# Patient Record
Sex: MALE | Race: WHITE | NOT HISPANIC OR LATINO | URBAN - METROPOLITAN AREA
[De-identification: names, ages, dates, MRNs, and addresses within clinical notes are randomized per-mention and may not be internally consistent; named-entity substitution may affect disease eponyms.]

---

## 2019-01-11 VITALS
HEART RATE: 89 BPM | OXYGEN SATURATION: 100 % | RESPIRATION RATE: 18 BRPM | TEMPERATURE: 100 F | SYSTOLIC BLOOD PRESSURE: 160 MMHG | DIASTOLIC BLOOD PRESSURE: 77 MMHG

## 2019-01-11 PROCEDURE — 99285 EMERGENCY DEPT VISIT HI MDM: CPT | Mod: 25

## 2019-01-11 RX ORDER — ACETAMINOPHEN 500 MG
975 TABLET ORAL ONCE
Qty: 0 | Refills: 0 | Status: COMPLETED | OUTPATIENT
Start: 2019-01-11 | End: 2019-01-11

## 2019-01-11 RX ORDER — SODIUM CHLORIDE 9 MG/ML
1000 INJECTION INTRAMUSCULAR; INTRAVENOUS; SUBCUTANEOUS ONCE
Qty: 0 | Refills: 0 | Status: COMPLETED | OUTPATIENT
Start: 2019-01-11 | End: 2019-01-11

## 2019-01-11 RX ADMIN — Medication 975 MILLIGRAM(S): at 23:53

## 2019-01-11 RX ADMIN — SODIUM CHLORIDE 1000 MILLILITER(S): 9 INJECTION INTRAMUSCULAR; INTRAVENOUS; SUBCUTANEOUS at 23:53

## 2019-01-11 NOTE — ED ADULT TRIAGE NOTE - CHIEF COMPLAINT QUOTE
BIBA for abdominal pain with diarrhea x 4 days. Patient was seen at [facility] ed yest, treated and released as per pt. oral temp 100.3 at triage. All other vs are stable.

## 2019-01-12 ENCOUNTER — INPATIENT (INPATIENT)
Facility: HOSPITAL | Age: 49
LOS: 1 days | Discharge: HOME | DRG: 975 | End: 2019-01-14
Attending: HOSPITALIST | Admitting: HOSPITALIST
Payer: MEDICARE

## 2019-01-12 DIAGNOSIS — K52.9 NONINFECTIVE GASTROENTERITIS AND COLITIS, UNSPECIFIED: ICD-10-CM

## 2019-01-12 DIAGNOSIS — Z29.9 ENCOUNTER FOR PROPHYLACTIC MEASURES, UNSPECIFIED: ICD-10-CM

## 2019-01-12 DIAGNOSIS — A41.9 SEPSIS, UNSPECIFIED ORGANISM: ICD-10-CM

## 2019-01-12 DIAGNOSIS — E87.8 OTHER DISORDERS OF ELECTROLYTE AND FLUID BALANCE, NOT ELSEWHERE CLASSIFIED: ICD-10-CM

## 2019-01-12 DIAGNOSIS — B20 HUMAN IMMUNODEFICIENCY VIRUS [HIV] DISEASE: ICD-10-CM

## 2019-01-12 LAB
ALBUMIN SERPL ELPH-MCNC: 2.3 G/DL — LOW (ref 3.4–5)
ALBUMIN SERPL ELPH-MCNC: 3 G/DL — LOW (ref 3.4–5)
ALP SERPL-CCNC: 119 U/L — SIGNIFICANT CHANGE UP (ref 40–120)
ALP SERPL-CCNC: 82 U/L — SIGNIFICANT CHANGE UP (ref 40–120)
ALT FLD-CCNC: 29 U/L — SIGNIFICANT CHANGE UP (ref 12–42)
ALT FLD-CCNC: 41 U/L — SIGNIFICANT CHANGE UP (ref 12–42)
ANION GAP SERPL CALC-SCNC: 11 MMOL/L — SIGNIFICANT CHANGE UP (ref 5–17)
ANION GAP SERPL CALC-SCNC: 11 MMOL/L — SIGNIFICANT CHANGE UP (ref 9–16)
ANION GAP SERPL CALC-SCNC: 11 MMOL/L — SIGNIFICANT CHANGE UP (ref 9–16)
APPEARANCE UR: CLEAR — SIGNIFICANT CHANGE UP
APTT BLD: 24.8 SEC — LOW (ref 27.5–36.3)
AST SERPL-CCNC: 28 U/L — SIGNIFICANT CHANGE UP (ref 15–37)
AST SERPL-CCNC: 37 U/L — SIGNIFICANT CHANGE UP (ref 15–37)
BILIRUB SERPL-MCNC: 0.5 MG/DL — SIGNIFICANT CHANGE UP (ref 0.2–1.2)
BILIRUB SERPL-MCNC: 0.5 MG/DL — SIGNIFICANT CHANGE UP (ref 0.2–1.2)
BILIRUB UR-MCNC: NEGATIVE — SIGNIFICANT CHANGE UP
BUN SERPL-MCNC: 10 MG/DL — SIGNIFICANT CHANGE UP (ref 7–23)
BUN SERPL-MCNC: 11 MG/DL — SIGNIFICANT CHANGE UP (ref 7–23)
BUN SERPL-MCNC: 16 MG/DL — SIGNIFICANT CHANGE UP (ref 7–23)
CALCIUM SERPL-MCNC: 7.7 MG/DL — LOW (ref 8.4–10.5)
CALCIUM SERPL-MCNC: 7.7 MG/DL — LOW (ref 8.5–10.5)
CALCIUM SERPL-MCNC: 8.6 MG/DL — SIGNIFICANT CHANGE UP (ref 8.5–10.5)
CHLORIDE SERPL-SCNC: 102 MMOL/L — SIGNIFICANT CHANGE UP (ref 96–108)
CHLORIDE SERPL-SCNC: 103 MMOL/L — SIGNIFICANT CHANGE UP (ref 96–108)
CHLORIDE SERPL-SCNC: 96 MMOL/L — SIGNIFICANT CHANGE UP (ref 96–108)
CO2 SERPL-SCNC: 23 MMOL/L — SIGNIFICANT CHANGE UP (ref 22–31)
CO2 SERPL-SCNC: 23 MMOL/L — SIGNIFICANT CHANGE UP (ref 22–31)
CO2 SERPL-SCNC: 25 MMOL/L — SIGNIFICANT CHANGE UP (ref 22–31)
COLOR SPEC: YELLOW — SIGNIFICANT CHANGE UP
CREAT SERPL-MCNC: 0.84 MG/DL — SIGNIFICANT CHANGE UP (ref 0.5–1.3)
CREAT SERPL-MCNC: 1 MG/DL — SIGNIFICANT CHANGE UP (ref 0.5–1.3)
CREAT SERPL-MCNC: 1.21 MG/DL — SIGNIFICANT CHANGE UP (ref 0.5–1.3)
CULTURE RESULTS: SIGNIFICANT CHANGE UP
DIFF PNL FLD: NEGATIVE — SIGNIFICANT CHANGE UP
GLUCOSE BLDC GLUCOMTR-MCNC: 147 MG/DL — HIGH (ref 70–99)
GLUCOSE BLDC GLUCOMTR-MCNC: 73 MG/DL — SIGNIFICANT CHANGE UP (ref 70–99)
GLUCOSE SERPL-MCNC: 119 MG/DL — HIGH (ref 70–99)
GLUCOSE SERPL-MCNC: 124 MG/DL — HIGH (ref 70–99)
GLUCOSE SERPL-MCNC: 142 MG/DL — HIGH (ref 70–99)
GLUCOSE UR QL: NEGATIVE — SIGNIFICANT CHANGE UP
HCT VFR BLD CALC: 36.6 % — LOW (ref 39–50)
HCT VFR BLD CALC: 45 % — SIGNIFICANT CHANGE UP (ref 39–50)
HGB BLD-MCNC: 12.9 G/DL — LOW (ref 13–17)
HGB BLD-MCNC: 15.6 G/DL — SIGNIFICANT CHANGE UP (ref 13–17)
INR BLD: 1.15 — SIGNIFICANT CHANGE UP (ref 0.88–1.16)
KETONES UR-MCNC: NEGATIVE — SIGNIFICANT CHANGE UP
LACTATE SERPL-SCNC: 1.3 MMOL/L — SIGNIFICANT CHANGE UP (ref 0.4–2)
LEUKOCYTE ESTERASE UR-ACNC: NEGATIVE — SIGNIFICANT CHANGE UP
LYMPHOCYTES # BLD AUTO: 27 % — SIGNIFICANT CHANGE UP (ref 13–44)
MAGNESIUM SERPL-MCNC: 1.5 MG/DL — LOW (ref 1.6–2.6)
MAGNESIUM SERPL-MCNC: 1.9 MG/DL — SIGNIFICANT CHANGE UP (ref 1.6–2.6)
MAGNESIUM SERPL-MCNC: 2.1 MG/DL — SIGNIFICANT CHANGE UP (ref 1.6–2.6)
MCHC RBC-ENTMCNC: 29.2 PG — SIGNIFICANT CHANGE UP (ref 27–34)
MCHC RBC-ENTMCNC: 29.8 PG — SIGNIFICANT CHANGE UP (ref 27–34)
MCHC RBC-ENTMCNC: 34.7 G/DL — SIGNIFICANT CHANGE UP (ref 32–36)
MCHC RBC-ENTMCNC: 35.2 G/DL — SIGNIFICANT CHANGE UP (ref 32–36)
MCV RBC AUTO: 84.3 FL — SIGNIFICANT CHANGE UP (ref 80–100)
MCV RBC AUTO: 84.5 FL — SIGNIFICANT CHANGE UP (ref 80–100)
MONOCYTES NFR BLD AUTO: 9 % — SIGNIFICANT CHANGE UP (ref 2–14)
NEUTROPHILS NFR BLD AUTO: 31 % — LOW (ref 43–77)
NITRITE UR-MCNC: NEGATIVE — SIGNIFICANT CHANGE UP
PH UR: 5.5 — SIGNIFICANT CHANGE UP (ref 5–8)
PHOSPHATE SERPL-MCNC: 1.8 MG/DL — LOW (ref 2.5–4.5)
PLATELET # BLD AUTO: 160 K/UL — SIGNIFICANT CHANGE UP (ref 150–400)
PLATELET # BLD AUTO: 190 K/UL — SIGNIFICANT CHANGE UP (ref 150–400)
POTASSIUM SERPL-MCNC: 3.1 MMOL/L — LOW (ref 3.5–5.3)
POTASSIUM SERPL-MCNC: 3.6 MMOL/L — SIGNIFICANT CHANGE UP (ref 3.5–5.3)
POTASSIUM SERPL-MCNC: 4.1 MMOL/L — SIGNIFICANT CHANGE UP (ref 3.5–5.3)
POTASSIUM SERPL-SCNC: 3.1 MMOL/L — LOW (ref 3.5–5.3)
POTASSIUM SERPL-SCNC: 3.6 MMOL/L — SIGNIFICANT CHANGE UP (ref 3.5–5.3)
POTASSIUM SERPL-SCNC: 4.1 MMOL/L — SIGNIFICANT CHANGE UP (ref 3.5–5.3)
PROT SERPL-MCNC: 5.9 G/DL — LOW (ref 6.4–8.2)
PROT SERPL-MCNC: 7.5 G/DL — SIGNIFICANT CHANGE UP (ref 6.4–8.2)
PROT UR-MCNC: NEGATIVE MG/DL — SIGNIFICANT CHANGE UP
PROTHROM AB SERPL-ACNC: 12.8 SEC — SIGNIFICANT CHANGE UP (ref 10–12.9)
RBC # BLD: 4.33 M/UL — SIGNIFICANT CHANGE UP (ref 4.2–5.8)
RBC # BLD: 5.34 M/UL — SIGNIFICANT CHANGE UP (ref 4.2–5.8)
RBC # FLD: 13.2 % — SIGNIFICANT CHANGE UP (ref 10.3–14.5)
RBC # FLD: 13.3 % — SIGNIFICANT CHANGE UP (ref 10.3–14.5)
SODIUM SERPL-SCNC: 132 MMOL/L — SIGNIFICANT CHANGE UP (ref 132–145)
SODIUM SERPL-SCNC: 136 MMOL/L — SIGNIFICANT CHANGE UP (ref 132–145)
SODIUM SERPL-SCNC: 137 MMOL/L — SIGNIFICANT CHANGE UP (ref 135–145)
SP GR SPEC: 1.01 — SIGNIFICANT CHANGE UP (ref 1–1.03)
SPECIMEN SOURCE: SIGNIFICANT CHANGE UP
UROBILINOGEN FLD QL: 0.2 E.U./DL — SIGNIFICANT CHANGE UP
WBC # BLD: 6.2 K/UL — SIGNIFICANT CHANGE UP (ref 3.8–10.5)
WBC # BLD: 6.3 K/UL — SIGNIFICANT CHANGE UP (ref 3.8–10.5)
WBC # FLD AUTO: 6.2 K/UL — SIGNIFICANT CHANGE UP (ref 3.8–10.5)
WBC # FLD AUTO: 6.3 K/UL — SIGNIFICANT CHANGE UP (ref 3.8–10.5)

## 2019-01-12 PROCEDURE — 99220: CPT | Mod: 25

## 2019-01-12 PROCEDURE — 99222 1ST HOSP IP/OBS MODERATE 55: CPT | Mod: GC

## 2019-01-12 RX ORDER — SODIUM CHLORIDE 9 MG/ML
2000 INJECTION, SOLUTION INTRAVENOUS
Qty: 0 | Refills: 0 | Status: DISCONTINUED | OUTPATIENT
Start: 2019-01-12 | End: 2019-01-12

## 2019-01-12 RX ORDER — CEFTRIAXONE 500 MG/1
1 INJECTION, POWDER, FOR SOLUTION INTRAMUSCULAR; INTRAVENOUS EVERY 24 HOURS
Qty: 0 | Refills: 0 | Status: DISCONTINUED | OUTPATIENT
Start: 2019-01-12 | End: 2019-01-14

## 2019-01-12 RX ORDER — SODIUM CHLORIDE 9 MG/ML
1000 INJECTION, SOLUTION INTRAVENOUS
Qty: 0 | Refills: 0 | Status: DISCONTINUED | OUTPATIENT
Start: 2019-01-12 | End: 2019-01-13

## 2019-01-12 RX ORDER — MORPHINE SULFATE 50 MG/1
4 CAPSULE, EXTENDED RELEASE ORAL ONCE
Qty: 0 | Refills: 0 | Status: DISCONTINUED | OUTPATIENT
Start: 2019-01-12 | End: 2019-01-12

## 2019-01-12 RX ORDER — ONDANSETRON 8 MG/1
4 TABLET, FILM COATED ORAL ONCE
Qty: 0 | Refills: 0 | Status: COMPLETED | OUTPATIENT
Start: 2019-01-12 | End: 2019-01-12

## 2019-01-12 RX ORDER — METRONIDAZOLE 500 MG
500 TABLET ORAL ONCE
Qty: 0 | Refills: 0 | Status: COMPLETED | OUTPATIENT
Start: 2019-01-12 | End: 2019-01-12

## 2019-01-12 RX ORDER — POTASSIUM CHLORIDE 20 MEQ
40 PACKET (EA) ORAL ONCE
Qty: 0 | Refills: 0 | Status: COMPLETED | OUTPATIENT
Start: 2019-01-12 | End: 2019-01-12

## 2019-01-12 RX ORDER — ACETAMINOPHEN 500 MG
975 TABLET ORAL ONCE
Qty: 0 | Refills: 0 | Status: COMPLETED | OUTPATIENT
Start: 2019-01-12 | End: 2019-01-12

## 2019-01-12 RX ORDER — MORPHINE SULFATE 50 MG/1
2 CAPSULE, EXTENDED RELEASE ORAL ONCE
Qty: 0 | Refills: 0 | Status: DISCONTINUED | OUTPATIENT
Start: 2019-01-12 | End: 2019-01-12

## 2019-01-12 RX ORDER — AZITHROMYCIN 500 MG/1
500 TABLET, FILM COATED ORAL ONCE
Qty: 0 | Refills: 0 | Status: COMPLETED | OUTPATIENT
Start: 2019-01-12 | End: 2019-01-12

## 2019-01-12 RX ORDER — KETOROLAC TROMETHAMINE 30 MG/ML
30 SYRINGE (ML) INJECTION ONCE
Qty: 0 | Refills: 0 | Status: DISCONTINUED | OUTPATIENT
Start: 2019-01-12 | End: 2019-01-12

## 2019-01-12 RX ORDER — ACETAMINOPHEN 500 MG
650 TABLET ORAL EVERY 6 HOURS
Qty: 0 | Refills: 0 | Status: DISCONTINUED | OUTPATIENT
Start: 2019-01-12 | End: 2019-01-14

## 2019-01-12 RX ORDER — MAGNESIUM SULFATE 500 MG/ML
2 VIAL (ML) INJECTION ONCE
Qty: 0 | Refills: 0 | Status: COMPLETED | OUTPATIENT
Start: 2019-01-12 | End: 2019-01-12

## 2019-01-12 RX ORDER — POTASSIUM CHLORIDE 20 MEQ
10 PACKET (EA) ORAL ONCE
Qty: 0 | Refills: 0 | Status: COMPLETED | OUTPATIENT
Start: 2019-01-12 | End: 2019-01-12

## 2019-01-12 RX ADMIN — Medication 30 MILLIGRAM(S): at 07:48

## 2019-01-12 RX ADMIN — MORPHINE SULFATE 4 MILLIGRAM(S): 50 CAPSULE, EXTENDED RELEASE ORAL at 01:18

## 2019-01-12 RX ADMIN — SODIUM CHLORIDE 1000 MILLILITER(S): 9 INJECTION, SOLUTION INTRAVENOUS at 01:18

## 2019-01-12 RX ADMIN — Medication 975 MILLIGRAM(S): at 07:48

## 2019-01-12 RX ADMIN — Medication 650 MILLIGRAM(S): at 23:30

## 2019-01-12 RX ADMIN — AZITHROMYCIN 500 MILLIGRAM(S): 500 TABLET, FILM COATED ORAL at 02:25

## 2019-01-12 RX ADMIN — SODIUM CHLORIDE 100 MILLILITER(S): 9 INJECTION, SOLUTION INTRAVENOUS at 14:19

## 2019-01-12 RX ADMIN — MORPHINE SULFATE 2 MILLIGRAM(S): 50 CAPSULE, EXTENDED RELEASE ORAL at 07:48

## 2019-01-12 RX ADMIN — Medication 40 MILLIEQUIVALENT(S): at 10:26

## 2019-01-12 RX ADMIN — SODIUM CHLORIDE 1000 MILLILITER(S): 9 INJECTION, SOLUTION INTRAVENOUS at 10:24

## 2019-01-12 RX ADMIN — ONDANSETRON 4 MILLIGRAM(S): 8 TABLET, FILM COATED ORAL at 01:18

## 2019-01-12 RX ADMIN — Medication 100 MILLIGRAM(S): at 10:25

## 2019-01-12 RX ADMIN — Medication 100 MILLIGRAM(S): at 02:15

## 2019-01-12 RX ADMIN — Medication 50 GRAM(S): at 01:18

## 2019-01-12 RX ADMIN — CEFTRIAXONE 100 GRAM(S): 500 INJECTION, POWDER, FOR SOLUTION INTRAMUSCULAR; INTRAVENOUS at 16:55

## 2019-01-12 RX ADMIN — MORPHINE SULFATE 2 MILLIGRAM(S): 50 CAPSULE, EXTENDED RELEASE ORAL at 08:18

## 2019-01-12 RX ADMIN — Medication 975 MILLIGRAM(S): at 08:18

## 2019-01-12 RX ADMIN — Medication 100 MILLIEQUIVALENT(S): at 10:25

## 2019-01-12 NOTE — H&P ADULT - PROBLEM SELECTOR PLAN 5
F: no IVF  E: K>4 Mg>2, monitor and replete as needed  N: regular diet  Ppx: SCD, GI  D: RMF  FULL CODE F: no IVF  E: K>4 Mg>2, monitor and replete as needed  N: CLD advance as tolerated  Ppx: SQH  D: RMF  FULL CODE

## 2019-01-12 NOTE — H&P ADULT - ASSESSMENT
48M HIV/AIDS(CD4<200), HAART (Genvoya, recently started) who presents with diarrhea. Diarrhea started 5 days prior and is watery in nature, admitted for sepsis 2/2 shigella/eiec gastroenteritis

## 2019-01-12 NOTE — H&P ADULT - NSHPPHYSICALEXAM_GEN_ALL_CORE
Constitutional: NAD, lying in bed, lethargic, ill-appearing  HEENT: no eye discharge, no nasal discharge, no pharyngeal erythema, dry membranes  Neck: no lymphadenopathy, no JVD,  no carotid bruit  Cardiovascular: S1 and S2, RRR,  no M/R/G, non-displaced PMI  Respiratory: no wheezing, no rhonchi, no cough, no crackles   Gastrointestinal: BS+, soft, non-distended, non-tender, no ascites  Extremities: warm to touch, no edema  Vascular: 2+ peripheral pulses, normal capillary refill  Neurological: AAO x 4, PERRLA, EOMI, no nystagmus, 5/5 muscle strength, sensation intact   Musculoskeletal: no joint swelling  Skin: No rashes, no skin breakdown

## 2019-01-12 NOTE — ED ADULT NURSE NOTE - BOWEL SOUNDS LUQ
[place] [address] [facility]    [address]    [facility] [address]    Phone:  [phone]    Fax:  [phone]                                       After Visit Summary   5/13/2017    Ton Bagley    MRN: [id number]           After Visit Summary Signature Page     I have received my discharge instructions, and my questions have been answered. I have discussed any challenges I see with this plan with the nurse or doctor.    ..........................................................................................................................................  Patient/Patient Representative Signature      ..........................................................................................................................................  Patient Representative Print Name and Relationship to Patient    ..................................................               ................................................  Date                                            Time    ..........................................................................................................................................  Reviewed by Signature/Title    ...................................................              ..............................................  Date                                                            Time           hyperactive/present

## 2019-01-12 NOTE — H&P ADULT - PROBLEM SELECTOR PLAN 1
2/2 shigella/eiec gastroenteritis  Present on admission   -SIRS: 3/4 tachy, febrile, bandemia  -Lactate: normal  -CXR: low suspicion of pulmonary process  -UA: unremarkable  -Blood Cx: obtained, follow up  -30cc/kg IVF per sepsis protocol  -Abx: Ceftriaxone for shigella in immunosuppressed  -Hemodynamically stable  -Reperfusion assessment completed

## 2019-01-12 NOTE — ED ADULT NURSE NOTE - NSIMPLEMENTINTERV_GEN_ALL_ED
Implemented All Universal Safety Interventions:  [place] to call system. Call bell, personal items and telephone within reach. Instruct patient to call for assistance. Room bathroom lighting operational. Non-slip footwear when patient is off stretcher. Physically safe environment: no spills, clutter or unnecessary equipment. Stretcher in lowest position, wheels locked, appropriate side rails in place.

## 2019-01-12 NOTE — ED ADULT NURSE NOTE - CHIEF COMPLAINT QUOTE
BIBA for abdominal pain with diarrhea x 4 days. Patient was seen at [facility] ed yest, treated and released as per pt. oral temp 100.3 at triage. All other vs are stable.

## 2019-01-12 NOTE — H&P ADULT - NSHPLABSRESULTS_GEN_ALL_CORE
12.9   6.2   )-----------( 160      ( 12 Jan 2019 09:12 )             36.6   01-12    136  |  102  |  11  ----------------------------<  119<H>  3.1<L>   |  23  |  1.00    Ca    7.7<L>      12 Jan 2019 09:12  Mg     1.9     01-12    TPro  5.9<L>  /  Alb  2.3<L>  /  TBili  0.5  /  DBili  x   /  AST  28  /  ALT  29  /  AlkPhos  82  01-12

## 2019-01-12 NOTE — PATIENT PROFILE ADULT - NSASFUNCLEVELADLTRANSFER_GEN_A_NUR
2 = assistive person
- [id number] score = 0  - currently at sinus rhythm  - no medications presently  - cardiology consult ini the AM

## 2019-01-12 NOTE — ED CDU PROVIDER DISPOSITION NOTE - CLINICAL COURSE
Pt is immunocompromised and has infectious diarrhea.  VS have been volatile and pt continues to spike fevers.  Will admit to medicine for further management and observation.

## 2019-01-12 NOTE — H&P ADULT - ATTENDING COMMENTS
47 yo male with hx HIV/AIDs non compliant on HAART, from [facility] with diarrhea x 5 days, non bloody.    ICU Vital Signs Last 24 Hrs  T(C): 36.5 (12 Jan 2019 15:18), Max: 38.6 (12 Jan 2019 01:36)  T(F): 97.7 (12 Jan 2019 15:18), Max: 101.4 (12 Jan 2019 01:36)  HR: 75 (12 Jan 2019 15:18) (75 - 118)  BP: 98/61 (12 Jan 2019 15:18) (84/51 - 160/77)  BP(mean): --  ABP: --  ABP(mean): --  RR: 18 (12 Jan 2019 15:18) (18 - 22)  SpO2: 100% (12 Jan 2019 15:18) (91% - 100%)    AA and Ox3 NAD  ncat neck supple  s1s2 RRR  cta b/l  abd soft, NTND  ext no edema   skin warm and dry    1) gastroenteritis - shigella and eiec  - HD stable, lactate 1.3  - will tx with ceftriaxone  - IVF  - monitor and replete electrolytes    2) HIV/AIDs  - intermittently compliant with haart  - check VL  -HIV consult on monday

## 2019-01-12 NOTE — ED PROVIDER NOTE - PROGRESS NOTE DETAILS
Patient just gave stool sample. Have been having diarrhea ll night. Liquid dark brown. Labs reflect 29% bands, still feeling very sick, reveal his HIV diagnosis/AIDS). Will need Admission. Covered with Azithro and Flagyl when temp spiked. Stool studies sent. Got 1 L NS, 2 L D5NS. Will add 2 more liters. Patient just gave stool sample. Have been having diarrhea ll night. Liquid dark brown. Labs reflect 29% bands, still feeling very sick, reveal his HIV diagnosis/AIDS). Will need Admission. Covered with Azithro and Flagyl when temp spiked. Stool studies sent. Got 1 L NS, 2 L D5NS. Will add 2 more liters. Call placed to transfer center at [facility]. Awaiting callback. Admission declined by Dr. Brooks at this time. Will place on obs and repeat another set of labs.

## 2019-01-12 NOTE — H&P ADULT - HISTORY OF PRESENT ILLNESS
48M HIV/AIDS(CD4<200), HAART (Genvoya, recently started) who presents with diarrhea. Diarrhea started 5 days prior and is watery in nature. He denies any blood or mucous. Associated nausea and non-bloody, non-bilious vomiting. He cannot identify any exacerbating or alleviating factors. Has been unable tolerate PO intake. Seen at [facility] yesterday given Cipro and Flagyl.     In the ED VS(T:100.3f HR:118 BP:121/72 RR:18 O2%:94%RA). Labs unremarkable except for low mag on presentation. UA normal. Admitted for sepsis 2/2 gastroenteritis. Received 3L NS, electrolyte repletion, and flagyl.

## 2019-01-12 NOTE — ED CDU PROVIDER INITIAL DAY NOTE - OBJECTIVE STATEMENT
48 M with HIV, CD4 100's, newly on HAART (Genvioa) here with diarrhea x 4d. Watery, no blood or mucous. Also has some N/V. Hard to keep food/liquid down/in. Was seen at [facility] yesterday and tx'd IVF and started on two meds (Cipro/Flagyl). Was initially a difficult historian 2/2 distress/frustration. Gave better hx once more hydrated. No f/c. Feels very bad all over. Crampy abdo pains when having BMs. + MSM, anal insertive.

## 2019-01-12 NOTE — ED PROVIDER NOTE - CARE PLAN
Principal Discharge DX:	Diarrhea, unspecified type  Secondary Diagnosis:	AIDS (acquired immunodeficiency syndrome), CD4 <=200  Secondary Diagnosis:	Dehydration

## 2019-01-12 NOTE — ED CDU PROVIDER INITIAL DAY NOTE - DETAILS
Patient placed on obs for IVF and repeat labs, clinical reassessment for AIDs with severe diarrhea. No beds at [facility] at this time.

## 2019-01-12 NOTE — ED PROVIDER NOTE - OBJECTIVE STATEMENT
48 M with HIV, CD4 100's, newly on HAART (Genvioa) here with diarrhea x 4d. Watery, no blood or mucous. Also has some N/V. Hard to keep food/liquid down/in. Was seen at [facility] yesterday and tx'd IVF and started on two meds (Cipro/Flagyl). Was initially a difficult historian 2/2 distress/frustration. Gave better hx once more hydrated. No f/c. Feels very bad all over. Crampy abdo pains when having BMs. + MSM, anal insertive.

## 2019-01-12 NOTE — H&P ADULT - PROBLEM SELECTOR PLAN 2
Stool PCR showing shigella/eiec  -IVF hydration  -aggressive electrolyte monitoring and repleation  -Ceftriaxone 1g q24h, patient is immunosuppressed so will treat

## 2019-01-12 NOTE — ED PROVIDER NOTE - MEDICAL DECISION MAKING DETAILS
Patient presenting with diarrhea dn AIDS. Will plan to admit as he is very symptomatic and likely has a complicated.

## 2019-01-12 NOTE — ED CDU PROVIDER INITIAL DAY NOTE - PROGRESS NOTE DETAILS
Accept sign out from Dr. Chung.  Pt with newly dx HIV/AIDS who p/w diarrhea for the past 4 days.  Pt initially improved but sxs worsened overnight.  Dr. Chung attempted to admit pt but as per Dr. Brooks they would like us to continue hydration and re-check labs and see if pt still requires admission.  Will f/u today's labs and discuss case again with [facility] hospitalist. Pt diaphoretic with mild rigors.  Likely breaking fever.  Also dropped his BP and O2 sat.  Pt remains A&O x 3 and denies any dizziness.  Awaiting repeat labs.  Fluid going.  Supplemental O2 started via NC - O2 sat now in high 90s. Given change in labs and VS will reach out to Dr. Perla from ICU.  He believes pt is stable for the floor and will evaluate pt upon arrival to [facility].  Discussed again with Dr. Brooks, she accepts pt for admission.  Doesn't need contact isolation at this time as per Dr. Brooks.

## 2019-01-13 LAB
ALBUMIN SERPL ELPH-MCNC: 2.7 G/DL — LOW (ref 3.3–5)
ALP SERPL-CCNC: 69 U/L — SIGNIFICANT CHANGE UP (ref 40–120)
ALT FLD-CCNC: 15 U/L — SIGNIFICANT CHANGE UP (ref 10–45)
ANION GAP SERPL CALC-SCNC: 13 MMOL/L — SIGNIFICANT CHANGE UP (ref 5–17)
AST SERPL-CCNC: 15 U/L — SIGNIFICANT CHANGE UP (ref 10–40)
BASOPHILS NFR BLD AUTO: 0 % — SIGNIFICANT CHANGE UP (ref 0–2)
BILIRUB SERPL-MCNC: 0.3 MG/DL — SIGNIFICANT CHANGE UP (ref 0.2–1.2)
BUN SERPL-MCNC: 13 MG/DL — SIGNIFICANT CHANGE UP (ref 7–23)
CALCIUM SERPL-MCNC: 8.4 MG/DL — SIGNIFICANT CHANGE UP (ref 8.4–10.5)
CHLORIDE SERPL-SCNC: 106 MMOL/L — SIGNIFICANT CHANGE UP (ref 96–108)
CO2 SERPL-SCNC: 23 MMOL/L — SIGNIFICANT CHANGE UP (ref 22–31)
CREAT SERPL-MCNC: 1 MG/DL — SIGNIFICANT CHANGE UP (ref 0.5–1.3)
EOSINOPHIL NFR BLD AUTO: 1 % — SIGNIFICANT CHANGE UP (ref 0–6)
GLUCOSE SERPL-MCNC: 112 MG/DL — HIGH (ref 70–99)
HCT VFR BLD CALC: 34.6 % — LOW (ref 39–50)
HGB BLD-MCNC: 11.6 G/DL — LOW (ref 13–17)
LYMPHOCYTES # BLD AUTO: 23.7 % — SIGNIFICANT CHANGE UP (ref 13–44)
MAGNESIUM SERPL-MCNC: 2.2 MG/DL — SIGNIFICANT CHANGE UP (ref 1.6–2.6)
MCHC RBC-ENTMCNC: 28.8 PG — SIGNIFICANT CHANGE UP (ref 27–34)
MCHC RBC-ENTMCNC: 33.5 G/DL — SIGNIFICANT CHANGE UP (ref 32–36)
MCV RBC AUTO: 85.9 FL — SIGNIFICANT CHANGE UP (ref 80–100)
MONOCYTES NFR BLD AUTO: 13 % — SIGNIFICANT CHANGE UP (ref 2–14)
NEUTROPHILS NFR BLD AUTO: 62.3 % — SIGNIFICANT CHANGE UP (ref 43–77)
PHOSPHATE SERPL-MCNC: 2.1 MG/DL — LOW (ref 2.5–4.5)
PLATELET # BLD AUTO: 147 K/UL — LOW (ref 150–400)
POTASSIUM SERPL-MCNC: 3.6 MMOL/L — SIGNIFICANT CHANGE UP (ref 3.5–5.3)
POTASSIUM SERPL-SCNC: 3.6 MMOL/L — SIGNIFICANT CHANGE UP (ref 3.5–5.3)
PROT SERPL-MCNC: 5.7 G/DL — LOW (ref 6–8.3)
RBC # BLD: 4.03 M/UL — LOW (ref 4.2–5.8)
RBC # FLD: 14 % — SIGNIFICANT CHANGE UP (ref 10.3–16.9)
SODIUM SERPL-SCNC: 142 MMOL/L — SIGNIFICANT CHANGE UP (ref 135–145)
WBC # BLD: 3.8 K/UL — SIGNIFICANT CHANGE UP (ref 3.8–10.5)
WBC # FLD AUTO: 3.8 K/UL — SIGNIFICANT CHANGE UP (ref 3.8–10.5)

## 2019-01-13 PROCEDURE — 99232 SBSQ HOSP IP/OBS MODERATE 35: CPT | Mod: GC

## 2019-01-13 RX ORDER — SODIUM,POTASSIUM PHOSPHATES 278-250MG
1 POWDER IN PACKET (EA) ORAL ONCE
Qty: 0 | Refills: 0 | Status: COMPLETED | OUTPATIENT
Start: 2019-01-13 | End: 2019-01-13

## 2019-01-13 RX ORDER — NICOTINE POLACRILEX 2 MG
1 GUM BUCCAL DAILY
Qty: 0 | Refills: 0 | Status: DISCONTINUED | OUTPATIENT
Start: 2019-01-13 | End: 2019-01-13

## 2019-01-13 RX ORDER — ALPRAZOLAM 0.25 MG
0.5 TABLET ORAL ONCE
Qty: 0 | Refills: 0 | Status: DISCONTINUED | OUTPATIENT
Start: 2019-01-13 | End: 2019-01-13

## 2019-01-13 RX ORDER — NICOTINE POLACRILEX 2 MG
1 GUM BUCCAL DAILY
Qty: 0 | Refills: 0 | Status: DISCONTINUED | OUTPATIENT
Start: 2019-01-13 | End: 2019-01-14

## 2019-01-13 RX ADMIN — Medication 650 MILLIGRAM(S): at 11:09

## 2019-01-13 RX ADMIN — Medication 1 PACKET(S): at 09:39

## 2019-01-13 RX ADMIN — CEFTRIAXONE 100 GRAM(S): 500 INJECTION, POWDER, FOR SOLUTION INTRAMUSCULAR; INTRAVENOUS at 14:51

## 2019-01-13 RX ADMIN — Medication 0.5 MILLIGRAM(S): at 20:02

## 2019-01-13 RX ADMIN — Medication 1 PATCH: at 14:51

## 2019-01-13 RX ADMIN — Medication 650 MILLIGRAM(S): at 00:30

## 2019-01-13 RX ADMIN — Medication 1 PATCH: at 16:24

## 2019-01-13 RX ADMIN — Medication 650 MILLIGRAM(S): at 11:57

## 2019-01-13 NOTE — PROGRESS NOTE ADULT - PROBLEM SELECTOR PLAN 2
Stool PCR showing shigella/eiec  -IVF hydration  -aggressive electrolyte monitoring and repletion  -Ceftriaxone 1g q24h, patient is immunosuppressed so will treat

## 2019-01-13 NOTE — PROGRESS NOTE ADULT - PROBLEM SELECTOR PLAN 3
w/ history of AIDS based on CD4<200, unknown history of opportunistic infections. MSM. Does not remember diagnosis date.   -Recently started on Genvoya but has been taking with many missed doses.   - HIV consult on monday regarding restarting  -CD4, VL

## 2019-01-13 NOTE — PROGRESS NOTE ADULT - SUBJECTIVE AND OBJECTIVE BOX
OVERNIGHT EVENTS: MAHESH    SUBJECTIVE / INTERVAL HPI: Patient seen and examined at bedside. Pt states he is still not feeling well due to diarrhea. States he had 5 episodes of diarrhea overnight. Still watery non bloody. Denies F, Chills, N/V, CP, SOB, or palpitations.     VITAL SIGNS:  Vital Signs Last 24 Hrs  T(C): 36.7 (2019 15:40), Max: 36.7 (2019 15:40)  T(F): 98 (2019 15:40), Max: 98 (2019 15:40)  HR: 77 (2019 15:40) (77 - 82)  BP: 120/74 (2019 15:40) (98/63 - 120/74)  BP(mean): --  RR: 18 (2019 15:40) (18 - 19)  SpO2: 100% (2019 15:40) (95% - 100%)    PHYSICAL EXAM:    General: WDWN  HEENT: NC/AT; PERRL, anicteric sclera; MMM  Neck: supple  Cardiovascular: +S1/S2; RRR  Respiratory: CTA B/L; no W/R/R  Gastrointestinal: soft, NT/ND; +BSx4  Extremities: WWP; no edema, clubbing or cyanosis  Vascular: 2+ radial, DP/PT pulses B/L  Neurological: AAOx3; no focal deficits    MEDICATIONS:  MEDICATIONS  (STANDING):  cefTRIAXone   IVPB 1 Gram(s) IV Intermittent every 24 hours  dextrose 5% + sodium chloride 0.9%. 1000 milliLiter(s) (100 mL/Hr) IV Continuous <Continuous>  nicotine -  14 mG/24Hr(s) Patch 1 patch Transdermal daily    MEDICATIONS  (PRN):  acetaminophen   Tablet .. 650 milliGRAM(s) Oral every 6 hours PRN Temp greater or equal to 38C (100.4F), Moderate Pain (4 - 6)      ALLERGIES:  Allergies    No Known Allergies    Intolerances        LABS:                        11.6   3.8   )-----------( 147      ( 2019 05:55 )             34.6     01-13    142  |  106  |  13  ----------------------------<  112<H>  3.6   |  23  |  1.00    Ca    8.4      2019 05:55  Phos  2.1     -  Mg     2.2         TPro  5.7<L>  /  Alb  2.7<L>  /  TBili  0.3  /  DBili  x   /  AST  15  /  ALT  15  /  AlkPhos  69  -    PT/INR - ( 2019 01:16 )   PT: 12.8 sec;   INR: 1.15          PTT - ( 2019 01:16 )  PTT:24.8 sec  Urinalysis Basic - ( 2019 06:42 )    Color: Yellow / Appearance: Clear / S.010 / pH: x  Gluc: x / Ketone: NEGATIVE  / Bili: NEGATIVE / Urobili: 0.2 E.U./dL   Blood: x / Protein: NEGATIVE mg/dL / Nitrite: NEGATIVE   Leuk Esterase: NEGATIVE / RBC: x / WBC x   Sq Epi: x / Non Sq Epi: x / Bacteria: x      CAPILLARY BLOOD GLUCOSE      POCT Blood Glucose.: 73 mg/dL (2019 17:25)      RADIOLOGY & ADDITIONAL TESTS: Reviewed.

## 2019-01-13 NOTE — DIETITIAN INITIAL EVALUATION ADULT. - PROBLEM SELECTOR PLAN 3
w/ history of AIDS based on CD4<200, unknown history of opportunistic infections. MSM. Does not remember diagnosis date.   -Recently started on Genvoya but has been taking with many missed doses. Will discuss with HIV consult about if safe to restart during sepsis  -CD4, VL

## 2019-01-13 NOTE — CHART NOTE - NSCHARTNOTEFT_GEN_A_CORE
Upon Nutritional Assessment by the Registered Dietitian your patient was determined to meet criteria / has evidence of the following diagnosis/diagnoses:          [ ]  Mild Protein Calorie Malnutrition        [x ]  Moderate Protein Calorie Malnutrition        [ ] Severe Protein Calorie Malnutrition        [ ] Unspecified Protein Calorie Malnutrition        [ ] Underweight / BMI <19        [ ] Morbid Obesity / BMI > 40    - ~12% weight loss over unknown timeframe  - visible, mild b/l temporal muscle wasting  - suspected suboptimal nutrient intake given significantly increased nutrient needs    Findings as based on:  •  Comprehensive nutrition assessment and consultation    Treatment:    1. Honor food preferences  2. Add Ensure Enlive x4/d (1400kcal, 80g pro) - entered in verification system      PROVIDER Section:     By signing this assessment you are acknowledging and agree with the diagnosis/diagnoses assigned by the Registered Dietitian    Comments:

## 2019-01-13 NOTE — DIETITIAN INITIAL EVALUATION ADULT. - OTHER INFO
Pt with past medical history significant for HIV/AIDS (CD4 <200) on HAART.  Pt presents with diarrhea x5d; sepsis secondary to shigella/eiec gastroenteritis.  Pt endorses weight-stability at UBW ~175lbs, however, pt is currently 153lbs.  Pt was unaware of weight loss and is unable to quantify timeframe.  Current weight loss, visible muscle wasting and likely suboptimal nutrient intake given increased nutrient needs are concerning for moderate malnutrition.  Pt with recent diet advancement to Regular diet which pt is enjoying.  Pt complains of continued loose stool and slight nausea; RN aware.  Pt denies pain.  Skin: intact.

## 2019-01-13 NOTE — PROGRESS NOTE ADULT - ASSESSMENT
48M HIV/AIDS(CD4<200), HAART (Genvoya, recently started) who presents with diarrhea 2/2 shigella/eiec gastroenteritis

## 2019-01-13 NOTE — DIETITIAN INITIAL EVALUATION ADULT. - PROBLEM SELECTOR PLAN 5
F: no IVF  E: K>4 Mg>2, monitor and replete as needed  N: CLD advance as tolerated  Ppx: SQH  D: RMF  FULL CODE

## 2019-01-13 NOTE — PROGRESS NOTE ADULT - PROBLEM SELECTOR PLAN 1
2/2 shigella/eiec gastroenteritis  now resolved, Hemodynamically stable   - c/w Ceftriaxone for shigella in immunosuppressed

## 2019-01-13 NOTE — PROGRESS NOTE ADULT - SUBJECTIVE AND OBJECTIVE BOX
Pt seen and examined at bedside, no new complaints.  + still having diarrhea, non bloody.    Vital Signs Last 24 Hrs  T(C): 36.6 (13 Jan 2019 09:55), Max: 36.6 (12 Jan 2019 21:20)  T(F): 97.8 (13 Jan 2019 09:55), Max: 97.9 (12 Jan 2019 21:20)  HR: 82 (13 Jan 2019 09:55) (75 - 82)  BP: 118/75 (13 Jan 2019 09:55) (98/61 - 118/75)  BP(mean): --  RR: 18 (13 Jan 2019 09:55) (18 - 19)  SpO2: 100% (13 Jan 2019 09:55) (95% - 100%)    AA and O x 3 NAD  NCAT  neck supple s1s2 RRR, no murmers  CTA b/l, no w/r/r  abd soft NTND  ext no edema  no focal deficits   skin warm and dry

## 2019-01-13 NOTE — DIETITIAN INITIAL EVALUATION ADULT. - ENERGY NEEDS
ABW used to calculate energy needs due to pt's current body weight within % IBW   Needs adjusted per [facility] SOC for HIV/AIDS w/ CD4 <200; symptomatic; concern for malnutrition  Energy: 2776-3212kcal (40-45cal/kg)  Protein: 139-173.5g pro (2-2.5g pro/kg)  Fluid: 2429-2776ml (35-40ml/kg)

## 2019-01-14 VITALS
RESPIRATION RATE: 20 BRPM | DIASTOLIC BLOOD PRESSURE: 85 MMHG | HEART RATE: 74 BPM | OXYGEN SATURATION: 99 % | SYSTOLIC BLOOD PRESSURE: 130 MMHG | TEMPERATURE: 98 F

## 2019-01-14 LAB
-  AMPICILLIN: SIGNIFICANT CHANGE UP
-  CEFTRIAXONE: SIGNIFICANT CHANGE UP
-  CIPROFLOXACIN: SIGNIFICANT CHANGE UP
-  TRIMETHOPRIM/SULFAMETHOXAZOLE: SIGNIFICANT CHANGE UP
4/8 RATIO: 0.17 RATIO — LOW (ref 0.9–3.6)
ABS CD8: 394 /UL — SIGNIFICANT CHANGE UP (ref 142–740)
ANION GAP SERPL CALC-SCNC: 9 MMOL/L — SIGNIFICANT CHANGE UP (ref 5–17)
BUN SERPL-MCNC: 11 MG/DL — SIGNIFICANT CHANGE UP (ref 7–23)
CALCIUM SERPL-MCNC: 8.4 MG/DL — SIGNIFICANT CHANGE UP (ref 8.4–10.5)
CD16+CD56+ CELLS NFR BLD: 28 % — HIGH (ref 5–23)
CD16+CD56+ CELLS NFR SPEC: 217 /UL — SIGNIFICANT CHANGE UP (ref 71–410)
CD19 BLASTS SPEC-ACNC: 65 /UL — LOW (ref 84–469)
CD19 BLASTS SPEC-ACNC: 8 % — SIGNIFICANT CHANGE UP (ref 6–24)
CD3 BLASTS SPEC-ACNC: 490 /UL — LOW (ref 672–1870)
CD3 BLASTS SPEC-ACNC: 63 % — SIGNIFICANT CHANGE UP (ref 59–83)
CD4 %: 9 % — LOW (ref 30–62)
CD8 %: 51 % — HIGH (ref 12–36)
CHLORIDE SERPL-SCNC: 111 MMOL/L — HIGH (ref 96–108)
CO2 SERPL-SCNC: 23 MMOL/L — SIGNIFICANT CHANGE UP (ref 22–31)
CREAT SERPL-MCNC: 0.91 MG/DL — SIGNIFICANT CHANGE UP (ref 0.5–1.3)
CULTURE RESULTS: SIGNIFICANT CHANGE UP
GLUCOSE SERPL-MCNC: 89 MG/DL — SIGNIFICANT CHANGE UP (ref 70–99)
HCT VFR BLD CALC: 35.9 % — LOW (ref 39–50)
HGB BLD-MCNC: 12.3 G/DL — LOW (ref 13–17)
HIV-1 VIRAL LOAD RESULT: ABNORMAL
HIV1 RNA # SERPL NAA+PROBE: SIGNIFICANT CHANGE UP
HIV1 RNA SER-IMP: SIGNIFICANT CHANGE UP
HIV1 RNA SERPL NAA+PROBE-ACNC: ABNORMAL
HIV1 RNA SERPL NAA+PROBE-LOG#: 4.97 — SIGNIFICANT CHANGE UP
MCHC RBC-ENTMCNC: 29 PG — SIGNIFICANT CHANGE UP (ref 27–34)
MCHC RBC-ENTMCNC: 34.3 G/DL — SIGNIFICANT CHANGE UP (ref 32–36)
MCV RBC AUTO: 84.7 FL — SIGNIFICANT CHANGE UP (ref 80–100)
METHOD TYPE: SIGNIFICANT CHANGE UP
ORGANISM # SPEC MICROSCOPIC CNT: SIGNIFICANT CHANGE UP
ORGANISM # SPEC MICROSCOPIC CNT: SIGNIFICANT CHANGE UP
PLATELET # BLD AUTO: 181 K/UL — SIGNIFICANT CHANGE UP (ref 150–400)
POTASSIUM SERPL-MCNC: 3.8 MMOL/L — SIGNIFICANT CHANGE UP (ref 3.5–5.3)
POTASSIUM SERPL-SCNC: 3.8 MMOL/L — SIGNIFICANT CHANGE UP (ref 3.5–5.3)
RBC # BLD: 4.24 M/UL — SIGNIFICANT CHANGE UP (ref 4.2–5.8)
RBC # FLD: 13.7 % — SIGNIFICANT CHANGE UP (ref 10.3–16.9)
SODIUM SERPL-SCNC: 143 MMOL/L — SIGNIFICANT CHANGE UP (ref 135–145)
SPECIMEN SOURCE: SIGNIFICANT CHANGE UP
T-CELL CD4 SUBSET PNL BLD: 67 /UL — LOW (ref 489–1457)
WBC # BLD: 3.4 K/UL — LOW (ref 3.8–10.5)
WBC # FLD AUTO: 3.4 K/UL — LOW (ref 3.8–10.5)

## 2019-01-14 PROCEDURE — 36415 COLL VENOUS BLD VENIPUNCTURE: CPT

## 2019-01-14 PROCEDURE — 85730 THROMBOPLASTIN TIME PARTIAL: CPT

## 2019-01-14 PROCEDURE — 87536 HIV-1 QUANT&REVRSE TRNSCRPJ: CPT

## 2019-01-14 PROCEDURE — 87186 SC STD MICRODIL/AGAR DIL: CPT

## 2019-01-14 PROCEDURE — 85027 COMPLETE CBC AUTOMATED: CPT

## 2019-01-14 PROCEDURE — 83735 ASSAY OF MAGNESIUM: CPT

## 2019-01-14 PROCEDURE — 82962 GLUCOSE BLOOD TEST: CPT

## 2019-01-14 PROCEDURE — 99239 HOSP IP/OBS DSCHRG MGMT >30: CPT

## 2019-01-14 PROCEDURE — 96376 TX/PRO/DX INJ SAME DRUG ADON: CPT

## 2019-01-14 PROCEDURE — 87045 FECES CULTURE AEROBIC BACT: CPT

## 2019-01-14 PROCEDURE — 99232 SBSQ HOSP IP/OBS MODERATE 35: CPT | Mod: GC

## 2019-01-14 PROCEDURE — 85610 PROTHROMBIN TIME: CPT

## 2019-01-14 PROCEDURE — 85025 COMPLETE CBC W/AUTO DIFF WBC: CPT

## 2019-01-14 PROCEDURE — 84100 ASSAY OF PHOSPHORUS: CPT

## 2019-01-14 PROCEDURE — 81003 URINALYSIS AUTO W/O SCOPE: CPT

## 2019-01-14 PROCEDURE — 87177 OVA AND PARASITES SMEARS: CPT

## 2019-01-14 PROCEDURE — 96375 TX/PRO/DX INJ NEW DRUG ADDON: CPT

## 2019-01-14 PROCEDURE — G0378: CPT

## 2019-01-14 PROCEDURE — 83605 ASSAY OF LACTIC ACID: CPT

## 2019-01-14 PROCEDURE — 87507 IADNA-DNA/RNA PROBE TQ 12-25: CPT

## 2019-01-14 PROCEDURE — 80053 COMPREHEN METABOLIC PANEL: CPT

## 2019-01-14 PROCEDURE — 80048 BASIC METABOLIC PNL TOTAL CA: CPT

## 2019-01-14 PROCEDURE — 99285 EMERGENCY DEPT VISIT HI MDM: CPT | Mod: 25

## 2019-01-14 PROCEDURE — 96374 THER/PROPH/DIAG INJ IV PUSH: CPT

## 2019-01-14 PROCEDURE — 87046 STOOL CULTR AEROBIC BACT EA: CPT

## 2019-01-14 PROCEDURE — 71045 X-RAY EXAM CHEST 1 VIEW: CPT

## 2019-01-14 PROCEDURE — 87040 BLOOD CULTURE FOR BACTERIA: CPT

## 2019-01-14 PROCEDURE — 71045 X-RAY EXAM CHEST 1 VIEW: CPT | Mod: 26

## 2019-01-14 RX ORDER — ELVITEGRAVIR, COBICISTAT, EMTRICITABINE, AND TENOFOVIR ALAFENAMIDE 150; 150; 200; 10 MG/1; MG/1; MG/1; MG/1
1 TABLET ORAL
Qty: 0 | Refills: 0 | COMMUNITY
Start: 2019-01-14

## 2019-01-14 RX ORDER — NICOTINE POLACRILEX 2 MG
1 GUM BUCCAL
Qty: 0 | Refills: 0 | COMMUNITY
Start: 2019-01-14

## 2019-01-14 RX ORDER — CEFPODOXIME PROXETIL 100 MG
1 TABLET ORAL
Qty: 6 | Refills: 0 | OUTPATIENT
Start: 2019-01-14 | End: 2019-01-16

## 2019-01-14 RX ORDER — CEFPODOXIME PROXETIL 100 MG
200 TABLET ORAL EVERY 12 HOURS
Qty: 0 | Refills: 0 | Status: DISCONTINUED | OUTPATIENT
Start: 2019-01-14 | End: 2019-01-14

## 2019-01-14 RX ORDER — AZTREONAM 2 G
1 VIAL (EA) INJECTION
Qty: 30 | Refills: 0 | OUTPATIENT
Start: 2019-01-14 | End: 2019-02-12

## 2019-01-14 RX ORDER — CIPROFLOXACIN LACTATE 400MG/40ML
500 VIAL (ML) INTRAVENOUS EVERY 12 HOURS
Qty: 0 | Refills: 0 | Status: DISCONTINUED | OUTPATIENT
Start: 2019-01-14 | End: 2019-01-14

## 2019-01-14 RX ORDER — ELVITEGRAVIR, COBICISTAT, EMTRICITABINE, AND TENOFOVIR ALAFENAMIDE 150; 150; 200; 10 MG/1; MG/1; MG/1; MG/1
1 TABLET ORAL DAILY
Qty: 0 | Refills: 0 | Status: DISCONTINUED | OUTPATIENT
Start: 2019-01-14 | End: 2019-01-14

## 2019-01-14 RX ADMIN — ELVITEGRAVIR, COBICISTAT, EMTRICITABINE, AND TENOFOVIR ALAFENAMIDE 1 TABLET(S): 150; 150; 200; 10 TABLET ORAL at 13:35

## 2019-01-14 RX ADMIN — Medication 1 PATCH: at 07:36

## 2019-01-14 RX ADMIN — Medication 1 PATCH: at 13:36

## 2019-01-14 NOTE — DISCHARGE NOTE ADULT - MEDICATION SUMMARY - MEDICATIONS TO TAKE
I will START or STAY ON the medications listed below when I get home from the hospital:    elvitegravir/cobicistat/emtricitabine/tenofovir alafenamide 150 mg-150 mg-200 mg-10 mg oral tablet  -- 1 tab(s) by mouth once a day  -- Indication: For HIV (human immunodeficiency virus infection)    nicotine 14 mg/24 hr transdermal film, extended release  -- 1  by transdermal patch once a day  -- Indication: For Smoking cessation I will START or STAY ON the medications listed below when I get home from the hospital:    elvitegravir/cobicistat/emtricitabine/tenofovir alafenamide 150 mg-150 mg-200 mg-10 mg oral tablet  -- 1 tab(s) by mouth once a day  -- Indication: For HIV (human immunodeficiency virus infection)    cefpodoxime 200 mg oral tablet  -- 1 tab(s) by mouth every 12 hours  -- Indication: For Gastroenteritis    nicotine 14 mg/24 hr transdermal film, extended release  -- 1  by transdermal patch once a day  -- Indication: For Smoking cessation    Bactrim  mg-160 mg oral tablet  -- 1 tab(s) by mouth once a day   -- Avoid prolonged or excessive exposure to direct and/or artificial sunlight while taking this medication.  Finish all this medication unless otherwise directed by prescriber.  Medication should be taken with plenty of water.    -- Indication: For PCP prophylaxis

## 2019-01-14 NOTE — DISCHARGE NOTE ADULT - HOSPITAL COURSE
48M HIV/AIDS(CD4<200), HAART (Genvoya, recently started) who presents with diarrhea. Diarrhea started 5 days prior and is watery in nature, admitted for sepsis 2/2 Shigella/EIEC gastroenteritis. Patient was treated with IV Ceftriaxone and IVF for this condition with improvement in his diarrheal symptoms. Will be switched to PO Cefpodoxime Notably, he was foud to have a CD4 count of 67 on this admission and will need close follow-up with his outpatient HIV provider.

## 2019-01-14 NOTE — DISCHARGE NOTE ADULT - CARE PLAN
Principal Discharge DX:	Diarrhea, unspecified type  Goal:	Resolution of diarrhea and prevention of future recurrence.  Assessment and plan of treatment:	You came to the hospital with diarrhea. On GI PCR you were found to have Shigella/EIEC as the cause for this diarrhea. As you are immunocompromised from your underlying HIV, we decided to treat you with antibiotics. We gave your IV ceftriaxone in the hospital and you will get a medication for a few more days of antibiotics to finish the course. Please finish this medication and follow-up with your primary care/HIV doctor for this condition.  Secondary Diagnosis:	AIDS (acquired immunodeficiency syndrome), CD4 <=200  Assessment and plan of treatment:	You have a history of HIV and were found to have  CD4 count of 67 on this admission. You were continued on your home Genvoya. For this CD4 count you should also be on prophylaxis medications. We would like to start you on Bactrim prophylaxis for PCP. Please follow-up with your HIV doctor regarding this medication.  Secondary Diagnosis:	Sepsis  Assessment and plan of treatment:	You came into the hospital with sepsis likely 2/2 gastroenteritis. You have been given IV fluids and antibiotics for this condition and you have responded appropriately. Please follow-up with your doctor and continue taking the antibiotic regimen prescribed.  Secondary Diagnosis:	Electrolyte disturbance  Assessment and plan of treatment:	You were found to have a low potassium level on admission. This has been repleted throughout the hospitalization. Please continue to follow as an outpatient. Principal Discharge DX:	Diarrhea, unspecified type  Goal:	Resolution of diarrhea and prevention of future recurrence.  Assessment and plan of treatment:	You came to the hospital with diarrhea. On GI PCR you were found to have Shigella/EIEC as the cause for this diarrhea. As you are immunocompromised from your underlying HIV, we decided to treat you with antibiotics. We gave your IV ceftriaxone in the hospital and you will get a medication for a three more days of antibiotics to finish the course. Please finish this medication and follow-up with your primary care/HIV doctor for this condition.  Secondary Diagnosis:	AIDS (acquired immunodeficiency syndrome), CD4 <=200  Assessment and plan of treatment:	You have a history of HIV and were found to have  CD4 count of 67 on this admission. You were continued on your home Genvoya. For this CD4 count you should also be on prophylaxis medications. We would like to start you on Bactrim prophylaxis for PCP. Please follow-up with your HIV doctor regarding this medication.  Secondary Diagnosis:	Sepsis  Assessment and plan of treatment:	You came into the hospital with sepsis likely 2/2 gastroenteritis. You have been given IV fluids and antibiotics for this condition and you have responded appropriately. Please follow-up with your doctor and continue taking the antibiotic regimen prescribed.  Secondary Diagnosis:	Electrolyte disturbance  Assessment and plan of treatment:	You were found to have a low potassium level on admission. This has been repleted throughout the hospitalization. Please continue to follow as an outpatient.

## 2019-01-14 NOTE — CONSULT NOTE ADULT - SUBJECTIVE AND OBJECTIVE BOX
HIV CONSULT NOTE    HPI:  48M HIV/AIDS(CD4<200), HAART (Genvoya, recently started) who presents with diarrhea. Diarrhea started 5 days prior and is watery in nature. He denies any blood or mucous. Associated nausea and non-bloody, non-bilious vomiting. He cannot identify any exacerbating or alleviating factors. Has been unable tolerate PO intake. Seen at [facility] yesterday given Cipro and Flagyl.     In the ED VS(T:100.3f HR:118 BP:121/72 RR:18 O2%:94%RA). Labs unremarkable except for low mag on presentation. UA normal. Admitted for sepsis 2/2 gastroenteritis. Received 3L NS, electrolyte repletion, and flagyl. (12 Jan 2019 15:44)      PAST MEDICAL & SURGICAL HISTORY:  Human immunodeficiency virus (HIV) infection      REVIEW OF SYSTEMS:  Constitutional: no weakness, no fevers, no chills  Dermatologic: no rash  Respiratory: no SOB, no cough  Cardiovascular: no chest pain, no palpitations  Gastrointestinal: no nausea, no vomiting, no diarrhea  Genitourinary: no dysuria, no urinary frequency, no hematuria, no urinary retention  Musculoskeletal:	no weakness, no joint swelling/pain  Neurological: no focal weakness or numbness  Endocrine: no polyuria, no polydipsia    ACTIVE ANTIMICROBIAL/ANTIBIOTIC MEDICATIONS:  cefpodoxime 200 milliGRAM(s) Oral every 12 hours  tenofovir alafenamide 10 mG/bdrdijlmlfrn738 mG/cobicistat 150 mG/emtricitabine 200 mG (GENVOYA) 1 Tablet(s) Oral daily      OTHER MEDICATIONS:  acetaminophen   Tablet .. 650 milliGRAM(s) Oral every 6 hours PRN  nicotine -  14 mG/24Hr(s) Patch 1 patch Transdermal daily      ALLERGIES:  Allergies    No Known Allergies    Intolerances        SOCIAL HISTORY:      FAMILY HISTORY:  FAMILY HISTORY:  No pertinent family history in first degree relatives      VITAL SIGNS:  ICU Vital Signs Last 24 Hrs  T(C): 36.5 (14 Jan 2019 05:08), Max: 36.7 (13 Jan 2019 15:40)  T(F): 97.7 (14 Jan 2019 05:08), Max: 98 (13 Jan 2019 15:40)  HR: 74 (14 Jan 2019 05:08) (71 - 77)  BP: 130/85 (14 Jan 2019 05:08) (113/70 - 130/85)  BP(mean): --  ABP: --  ABP(mean): --  RR: 20 (14 Jan 2019 05:08) (18 - 20)  SpO2: 99% (14 Jan 2019 05:08) (99% - 100%)      PHYSICAL EXAM:  Constitutional: WDWN  Head: NC/AT  Eyes: PERRLA, EOMI; anicteric slcera  ENMT: no rhinorrhea; no sinus tenderness on palpation; no oropharyngeal lesions, erythema, or exudates	  Neck: supple; no JVD or LAD  Respiratory: CTA B/L  Cardiovascular: +S1/S2, RRR; no appreciable murmurs  Gastrointestinal: soft, NT/ND; +BSx4, no HSM  Extremities: WWP; no clubbing, cyanosis, or edema  Vascular: 2+ radial, DP/PT pulses B/L  Dermatologic: skin warm and dry; no visible rashes or lesions  Neurologic: AAOx3; no focal deficits    LABS:                        12.3   3.4   )-----------( 181      ( 14 Jan 2019 06:40 )             35.9     01-14    143  |  111<H>  |  11  ----------------------------<  89  3.8   |  23  |  0.91    Ca    8.4      14 Jan 2019 06:40  Phos  2.1     01-13  Mg     2.2     01-13    TPro  5.7<L>  /  Alb  2.7<L>  /  TBili  0.3  /  DBili  x   /  AST  15  /  ALT  15  /  AlkPhos  69  01-13          MICROBIOLOGY:    GI PCR Panel, Stool (collected 01-12-19 @ 12:11)  Source: .Stool Feces  Final Report (01-12-19 @ 14:18):    Shigella/Enteroinvasive E. coli (EIEC)    DETECTED by PCR    *******Please Note:*******    GI panel PCR evaluates for:    Campylobacter, Plesiomonas shigelloides, Salmonella,    Vibrio, Yersinia enterocolitica, Enteroaggregative    Escherichia coli (EAEC), Enteropathogenic E.coli (EPEC),    Enterotoxigenic E. coli (ETEC) lt/st, Shiga-like    toxin-producing E. coli (STEC) stx1/stx2,    Shigella/ Enteroinvasive E. coli (EIEC), Cryptosporidium,    Cyclospora cayetanensis, Entamoeba histolytica,    Giardia lamblia, Adenovirus F 40/41, Astrovirus,    Norovirus GI/GII, Rotavirus A, Sapovirus    Culture - Stool (collected 01-12-19 @ 12:06)  Source: .Stool Feces  Final Report (01-14-19 @ 11:50):    Moderate Shigella species    (Stool culture examined for Salmonella,    Shigella, Campylobacter, Aeromonas, Plesiomonas,    Vibrio, E.coli O157 and Yersinia)  Organism: Shigella species (01-14-19 @ 12:04)  Organism: Shigella species (01-14-19 @ 12:04)      -  Ampicillin: R >16 These ampicillin results predict results for amoxicillin      -  Ceftriaxone: S <=1      -  Ciprofloxacin: S <=1      -  Trimethoprim/Sulfamethoxazole: R >2/38      Method Type: TAYE    Culture - Blood (collected 01-12-19 @ 05:53)  Source: .Blood Blood-Peripheral  Preliminary Report (01-13-19 @ 06:01):    No growth to date.    Culture - Blood (collected 01-12-19 @ 05:53)  Source: .Blood Blood-Peripheral  Preliminary Report (01-13-19 @ 06:01):    No growth to date. HIV CONSULT NOTE    HPI:  48M HIV/AIDS(CD4<200), HAART (Genvoya, recently started) who presents with diarrhea. Diarrhea started 5 days prior and is watery in nature. He denies any blood or mucous. Associated nausea and non-bloody, non-bilious vomiting. He cannot identify any exacerbating or alleviating factors. Has been unable tolerate PO intake. Seen at [facility] yesterday given Cipro and Flagyl.     In the ED VS(T:100.3f HR:118 BP:121/72 RR:18 O2%:94%RA). Labs unremarkable except for low mag on presentation. UA normal. Admitted for sepsis 2/2 gastroenteritis. Received 3L NS, electrolyte repletion, and flagyl. (2019 15:44)    HIV HISTORY:  CD4 Michael: less than 60s  Current ARVs: Genvoya  Past ARVs: Prezista, Norvir, Truvada  No history of OIs       PAST MEDICAL & SURGICAL HISTORY:  Human immunodeficiency virus (HIV) infection    REVIEW OF SYSTEMS:  Constitutional: +fevers, night sweats  Dermatologic: +LE wound and scars  Respiratory: +cough  Cardiovascular: no chest pain, no palpitations  Gastrointestinal: +diarrhea  Genitourinary: no dysuria, no urinary frequency, no hematuria, no urinary retention  Musculoskeletal:	no weakness, no joint swelling/pain  Neurological: no focal weakness or numbness  Endocrine: no polyuria, no polydipsia    ACTIVE ANTIMICROBIAL/ANTIBIOTIC MEDICATIONS:  cefpodoxime 200 milliGRAM(s) Oral every 12 hours  tenofovir alafenamide 10 mG/fkdftagbzoay040 mG/cobicistat 150 mG/emtricitabine 200 mG (GENVOYA) 1 Tablet(s) Oral daily      OTHER MEDICATIONS:  acetaminophen   Tablet .. 650 milliGRAM(s) Oral every 6 hours PRN  nicotine -  14 mG/24Hr(s) Patch 1 patch Transdermal daily      ALLERGIES:  Allergies    No Known Allergies    Intolerances        SOCIAL HISTORY: MSM, last treated for STIs about 6 months     FAMILY HISTORY:  No pertinent family history in first degree relatives      VITAL SIGNS:  ICU Vital Signs Last 24 Hrs  T(C): 36.5 (2019 05:08), Max: 36.7 (2019 15:40)  T(F): 97.7 (2019 05:08), Max: 98 (2019 15:40)  HR: 74 (2019 05:08) (71 - 77)  BP: 130/85 (2019 05:08) (113/70 - 130/85)  BP(mean): --  ABP: --  ABP(mean): --  RR: 20 (2019 05:08) (18 - 20)  SpO2: 99% (2019 05:08) (99% - 100%)      PHYSICAL EXAM:  Constitutional: WDWN  Head: NC/AT; facial lipodystrophy   Eyes: anicteric slcera  ENMT: no rhinorrhea; no sinus tenderness on palpation; no oropharyngeal lesions, erythema, or exudates	  Neck: 1 lymph node in the posterior cervical chain on the right  Respiratory: CTA B/L  Cardiovascular: +S1/S2, RRR; no appreciable murmurs  Gastrointestinal: soft, NT/ND; +BSx4, no HSM  Extremities: WWP; no clubbing, cyanosis, or edema; wound on lateral left leg, clean and dry, +R inguinal LAD 4cm in total    Dermatologic: areas of dry hyperpigmentation on the left lateral leg  Neurologic: AAOx3; no focal deficits    LABS:                        12.3   3.4   )-----------( 181      ( 2019 06:40 )             35.9     01-14    143  |  111<H>  |  11  ----------------------------<  89  3.8   |  23  |  0.91    Ca    8.4      2019 06:40  Phos  2.1       Mg     2.2         TPro  5.7<L>  /  Alb  2.7<L>  /  TBili  0.3  /  DBili  x   /  AST  15  /  ALT  15  /  AlkPhos  69            MICROBIOLOGY:    GI PCR Panel, Stool (collected 19 @ 12:11)  Source: .Stool Feces  Final Report (19 @ 14:18):    Shigella/Enteroinvasive E. coli (EIEC)    DETECTED by PCR    *******Please Note:*******    GI panel PCR evaluates for:    Campylobacter, Plesiomonas shigelloides, Salmonella,    Vibrio, Yersinia enterocolitica, Enteroaggregative    Escherichia coli (EAEC), Enteropathogenic E.coli (EPEC),    Enterotoxigenic E. coli (ETEC) lt/st, Shiga-like    toxin-producing E. coli (STEC) stx1/stx2,    Shigella/ Enteroinvasive E. coli (EIEC), Cryptosporidium,    Cyclospora cayetanensis, Entamoeba histolytica,    Giardia lamblia, Adenovirus F 40/41, Astrovirus,    Norovirus GI/GII, Rotavirus A, Sapovirus    Culture - Stool (collected 19 @ 12:06)  Source: .Stool Feces  Final Report (19 @ 11:50):    Moderate Shigella species    (Stool culture examined for Salmonella,    Shigella, Campylobacter, Aeromonas, Plesiomonas,    Vibrio, E.coli O157 and Yersinia)  Organism: Shigella species (19 @ 12:04)  Organism: Shigella species (19 @ 12:04)      -  Ampicillin: R >16 These ampicillin results predict results for amoxicillin      -  Ceftriaxone: S <=1      -  Ciprofloxacin: S <=1      -  Trimethoprim/Sulfamethoxazole: R >2/38      Method Type: TAYE    Culture - Blood (collected 19 @ 05:53)  Source: .Blood Blood-Peripheral  Preliminary Report (19 @ 06:01):    No growth to date.    Culture - Blood (collected 19 @ 05:53)  Source: .Blood Blood-Peripheral  Preliminary Report (19 @ 06:01):    No growth to date.      Full T Cell Subset (19 @ 13:08)    CD8 %: 51 %    ABS CU6420: 217 /uL    ABS CD19: 65 /uL    ABS CD3: 490 /uL    ABS CD4: 67 /uL    ABS CD8: 394 /uL    JR3866 %: 28 %    CD19 %: 8 %    CD3 %: 63: Reference ranges for pediatric population (0-18 years old) are from  Alonzo Garcia, et al. "Lymphocyte subsets in healthy children from  birth through 18 years of age: the Pediatric AIDS Clinical Trials Group   study. "Journal of Allergy and Clinical Immunology 112.5 (2003):  973-980.  Reference range for adult (18-65 years old) and geriatric (65 years old  and above) populations are developed at [place], [place]. %    CD4 %: 9 %    HIV-1 RNA Quantitative, Viral Load (19 @ 20:13)    HIV-1 RNA Quantitative, Viral Load:   93,831    HIV-1 RNA Quantitative, Vir Load Interp: See Comment METHOD: Transcription Mediated Amplification (TMA) – [facility] [place].  Results from HIV-1 RNA tests that use other  methods might differ.  Abbreviations:  DET. = Detected,  not det. = Not Detected  n/a = not available  .    HIV-1 RNA Quantitative, Viral Load Lo.97    HIV-1 Viral Load Result: DET.

## 2019-01-14 NOTE — CONSULT NOTE ADULT - ASSESSMENT
48M MHx of HIV/AIDS (CD4 67, VL 94K) admitted to [facility] for gastroenteritis 2/2 Shigella.     #HIV/AIDS (CD4 67, VL 94K) on Genvoya   - Continue with Bactrim  - Start PCP prophylaxis with Bactrim DS 1tab PO daily   - Patient has a follow up appointment with his provider tomorrow morning

## 2019-01-14 NOTE — DISCHARGE NOTE ADULT - PROVIDER TOKENS
FREE:[LAST:[Glenda],FIRST:[Osmar],PHONE:[phone]],FAX:[(   [phone]    -],ADDRESS:[[address] [place] [address], [address] [place] [address]]]

## 2019-01-14 NOTE — DISCHARGE NOTE ADULT - PATIENT PORTAL LINK FT
You can access the [facility] Patient Portal, offered by [facility], by registering with the following website: [email or web address]

## 2019-01-14 NOTE — DISCHARGE NOTE ADULT - PLAN OF CARE
Resolution of diarrhea and prevention of future recurrence. You came to the hospital with diarrhea. On GI PCR you were found to have Shigella/EIEC as the cause for this diarrhea. As you are immunocompromised from your underlying HIV, we decided to treat you with antibiotics. We gave your IV ceftriaxone in the hospital and you will get a medication for a few more days of antibiotics to finish the course. Please finish this medication and follow-up with your primary care/HIV doctor for this condition. You have a history of HIV and were found to have  CD4 count of 67 on this admission. You were continued on your home Genvoya. For this CD4 count you should also be on prophylaxis medications. We would like to start you on Bactrim prophylaxis for PCP. Please follow-up with your HIV doctor regarding this medication. You came into the hospital with sepsis likely 2/2 gastroenteritis. You have been given IV fluids and antibiotics for this condition and you have responded appropriately. Please follow-up with your doctor and continue taking the antibiotic regimen prescribed. You were found to have a low potassium level on admission. This has been repleted throughout the hospitalization. Please continue to follow as an outpatient. You came to the hospital with diarrhea. On GI PCR you were found to have Shigella/EIEC as the cause for this diarrhea. As you are immunocompromised from your underlying HIV, we decided to treat you with antibiotics. We gave your IV ceftriaxone in the hospital and you will get a medication for a three more days of antibiotics to finish the course. Please finish this medication and follow-up with your primary care/HIV doctor for this condition.

## 2019-01-14 NOTE — CONSULT NOTE ADULT - ATTENDING COMMENTS
A group of R inguinal nodes with total measurement of 3x1 cm  benign characteristics, possibly secondary to old infections  No other significant lymph nodes  Patient is being discharged today, med adherence stressed. Educated on HIV prognosis and HIV lymphadenopathy.  Recommended FU with PCP ( already has appointment)

## 2019-01-15 LAB
-  AMPICILLIN: SIGNIFICANT CHANGE UP
-  CIPROFLOXACIN: SIGNIFICANT CHANGE UP
-  TRIMETHOPRIM/SULFAMETHOXAZOLE: SIGNIFICANT CHANGE UP
CULTURE RESULTS: SIGNIFICANT CHANGE UP
CULTURE RESULTS: SIGNIFICANT CHANGE UP
METHOD TYPE: SIGNIFICANT CHANGE UP
ORGANISM # SPEC MICROSCOPIC CNT: SIGNIFICANT CHANGE UP
ORGANISM # SPEC MICROSCOPIC CNT: SIGNIFICANT CHANGE UP
SPECIMEN SOURCE: SIGNIFICANT CHANGE UP

## 2019-01-17 DIAGNOSIS — F17.210 NICOTINE DEPENDENCE, CIGARETTES, UNCOMPLICATED: ICD-10-CM

## 2019-01-17 DIAGNOSIS — B20 HUMAN IMMUNODEFICIENCY VIRUS [HIV] DISEASE: ICD-10-CM

## 2019-01-17 DIAGNOSIS — E86.0 DEHYDRATION: ICD-10-CM

## 2019-01-17 DIAGNOSIS — E44.0 MODERATE PROTEIN-CALORIE MALNUTRITION: ICD-10-CM

## 2019-01-17 DIAGNOSIS — A41.9 SEPSIS, UNSPECIFIED ORGANISM: ICD-10-CM

## 2019-01-17 DIAGNOSIS — A04.8 OTHER SPECIFIED BACTERIAL INTESTINAL INFECTIONS: ICD-10-CM

## 2019-01-17 DIAGNOSIS — R19.7 DIARRHEA, UNSPECIFIED: ICD-10-CM

## 2019-01-17 DIAGNOSIS — A03.9 SHIGELLOSIS, UNSPECIFIED: ICD-10-CM

## 2019-01-17 LAB
CULTURE RESULTS: SIGNIFICANT CHANGE UP
CULTURE RESULTS: SIGNIFICANT CHANGE UP
SPECIMEN SOURCE: SIGNIFICANT CHANGE UP
SPECIMEN SOURCE: SIGNIFICANT CHANGE UP

## 2019-11-12 NOTE — DIETITIAN INITIAL EVALUATION ADULT. - PROBLEM SELECTOR PROBLEM 2
Addended by: LESLEY PLUNKETT on: 11/12/2019 04:09 PM     Modules accepted: Orders     Gastroenteritis

## 2021-07-20 NOTE — PATIENT PROFILE ADULT - NSTRANSFERBELONGINGSRESP_GEN_A_NUR
Detail Level: Simple
Plan: Will obtain ultrasound report (from pediatrician or [facility]) to see if feasible to perform procedure in office but counseled patient and patient’s mother that most likely will need to refer to general surgery for excision given size, location, and depth of lesion which is causing back pain for patient. Patient given information for surgeon Dr. Mauri Anderson and encouraged to schedule consultation.
yes

## 2023-09-24 ENCOUNTER — EMERGENCY (EMERGENCY)
Facility: HOSPITAL | Age: 53
LOS: 1 days | Discharge: HOME | End: 2023-09-24
Admitting: EMERGENCY MEDICINE
Payer: MEDICARE

## 2023-09-24 VITALS
WEIGHT: 179.9 LBS | SYSTOLIC BLOOD PRESSURE: 106 MMHG | TEMPERATURE: 99 F | RESPIRATION RATE: 16 BRPM | DIASTOLIC BLOOD PRESSURE: 64 MMHG | OXYGEN SATURATION: 97 % | HEART RATE: 113 BPM

## 2023-09-24 VITALS
TEMPERATURE: 98 F | RESPIRATION RATE: 18 BRPM | HEART RATE: 92 BPM | DIASTOLIC BLOOD PRESSURE: 85 MMHG | OXYGEN SATURATION: 100 % | SYSTOLIC BLOOD PRESSURE: 132 MMHG

## 2023-09-24 PROBLEM — B20 HUMAN IMMUNODEFICIENCY VIRUS [HIV] DISEASE: Chronic | Status: ACTIVE | Noted: 2019-01-12

## 2023-09-24 LAB
ALBUMIN SERPL ELPH-MCNC: 2.9 G/DL — LOW (ref 3.4–5)
ALP SERPL-CCNC: 113 U/L — SIGNIFICANT CHANGE UP (ref 40–120)
ALT FLD-CCNC: 14 U/L — SIGNIFICANT CHANGE UP (ref 12–42)
ANION GAP SERPL CALC-SCNC: 7 MMOL/L — LOW (ref 9–16)
APTT BLD: 39.8 SEC — HIGH (ref 24.5–35.6)
AST SERPL-CCNC: 21 U/L — SIGNIFICANT CHANGE UP (ref 15–37)
BASOPHILS # BLD AUTO: 0.04 K/UL — SIGNIFICANT CHANGE UP (ref 0–0.2)
BASOPHILS NFR BLD AUTO: 0.5 % — SIGNIFICANT CHANGE UP (ref 0–2)
BILIRUB SERPL-MCNC: 0.1 MG/DL — LOW (ref 0.2–1.2)
BUN SERPL-MCNC: 9 MG/DL — SIGNIFICANT CHANGE UP (ref 7–23)
CALCIUM SERPL-MCNC: 9.4 MG/DL — SIGNIFICANT CHANGE UP (ref 8.5–10.5)
CHLORIDE SERPL-SCNC: 104 MMOL/L — SIGNIFICANT CHANGE UP (ref 96–108)
CO2 SERPL-SCNC: 29 MMOL/L — SIGNIFICANT CHANGE UP (ref 22–31)
CREAT SERPL-MCNC: 1.06 MG/DL — SIGNIFICANT CHANGE UP (ref 0.5–1.3)
EGFR: 83 ML/MIN/1.73M2 — SIGNIFICANT CHANGE UP
EOSINOPHIL # BLD AUTO: 0.51 K/UL — HIGH (ref 0–0.5)
EOSINOPHIL NFR BLD AUTO: 6.2 % — HIGH (ref 0–6)
FLUAV AG NPH QL: SIGNIFICANT CHANGE UP
FLUBV AG NPH QL: SIGNIFICANT CHANGE UP
GLUCOSE SERPL-MCNC: 123 MG/DL — HIGH (ref 70–99)
HCT VFR BLD CALC: 41.9 % — SIGNIFICANT CHANGE UP (ref 39–50)
HGB BLD-MCNC: 13.1 G/DL — SIGNIFICANT CHANGE UP (ref 13–17)
IMM GRANULOCYTES NFR BLD AUTO: 0.4 % — SIGNIFICANT CHANGE UP (ref 0–0.9)
INR BLD: 1.77 — HIGH (ref 0.85–1.18)
LACTATE BLDV-MCNC: 1.2 MMOL/L — SIGNIFICANT CHANGE UP (ref 0.5–2)
LACTATE BLDV-MCNC: 3.3 MMOL/L — HIGH (ref 0.5–2)
LYMPHOCYTES # BLD AUTO: 1.41 K/UL — SIGNIFICANT CHANGE UP (ref 1–3.3)
LYMPHOCYTES # BLD AUTO: 17.1 % — SIGNIFICANT CHANGE UP (ref 13–44)
MCHC RBC-ENTMCNC: 28.4 PG — SIGNIFICANT CHANGE UP (ref 27–34)
MCHC RBC-ENTMCNC: 31.3 GM/DL — LOW (ref 32–36)
MCV RBC AUTO: 90.9 FL — SIGNIFICANT CHANGE UP (ref 80–100)
MONOCYTES # BLD AUTO: 0.53 K/UL — SIGNIFICANT CHANGE UP (ref 0–0.9)
MONOCYTES NFR BLD AUTO: 6.4 % — SIGNIFICANT CHANGE UP (ref 2–14)
NEUTROPHILS # BLD AUTO: 5.74 K/UL — SIGNIFICANT CHANGE UP (ref 1.8–7.4)
NEUTROPHILS NFR BLD AUTO: 69.4 % — SIGNIFICANT CHANGE UP (ref 43–77)
NRBC # BLD: 0 /100 WBCS — SIGNIFICANT CHANGE UP (ref 0–0)
PLATELET # BLD AUTO: 303 K/UL — SIGNIFICANT CHANGE UP (ref 150–400)
POTASSIUM SERPL-MCNC: 4.1 MMOL/L — SIGNIFICANT CHANGE UP (ref 3.5–5.3)
POTASSIUM SERPL-SCNC: 4.1 MMOL/L — SIGNIFICANT CHANGE UP (ref 3.5–5.3)
PROT SERPL-MCNC: 8.3 G/DL — HIGH (ref 6.4–8.2)
PROTHROM AB SERPL-ACNC: 19.1 SEC — HIGH (ref 9.5–13)
RAPID RVP RESULT: DETECTED
RBC # BLD: 4.61 M/UL — SIGNIFICANT CHANGE UP (ref 4.2–5.8)
RBC # FLD: 15.4 % — HIGH (ref 10.3–14.5)
RSV RNA NPH QL NAA+NON-PROBE: SIGNIFICANT CHANGE UP
RV+EV RNA SPEC QL NAA+PROBE: DETECTED
SARS-COV-2 RNA SPEC QL NAA+PROBE: SIGNIFICANT CHANGE UP
SARS-COV-2 RNA SPEC QL NAA+PROBE: SIGNIFICANT CHANGE UP
SODIUM SERPL-SCNC: 140 MMOL/L — SIGNIFICANT CHANGE UP (ref 132–145)
WBC # BLD: 8.26 K/UL — SIGNIFICANT CHANGE UP (ref 3.8–10.5)
WBC # FLD AUTO: 8.26 K/UL — SIGNIFICANT CHANGE UP (ref 3.8–10.5)

## 2023-09-24 PROCEDURE — 71046 X-RAY EXAM CHEST 2 VIEWS: CPT | Mod: 26

## 2023-09-24 PROCEDURE — 99285 EMERGENCY DEPT VISIT HI MDM: CPT

## 2023-09-24 PROCEDURE — 93971 EXTREMITY STUDY: CPT | Mod: 26,RT

## 2023-09-24 PROCEDURE — 71275 CT ANGIOGRAPHY CHEST: CPT | Mod: 26,MH

## 2023-09-24 RX ORDER — AZITHROMYCIN 500 MG/1
500 TABLET, FILM COATED ORAL ONCE
Refills: 0 | Status: DISCONTINUED | OUTPATIENT
Start: 2023-09-24 | End: 2023-09-24

## 2023-09-24 RX ORDER — VANCOMYCIN HCL 1 G
1000 VIAL (EA) INTRAVENOUS ONCE
Refills: 0 | Status: COMPLETED | OUTPATIENT
Start: 2023-09-24 | End: 2023-09-24

## 2023-09-24 RX ORDER — CEFTRIAXONE 500 MG/1
1000 INJECTION, POWDER, FOR SOLUTION INTRAMUSCULAR; INTRAVENOUS ONCE
Refills: 0 | Status: COMPLETED | OUTPATIENT
Start: 2023-09-24 | End: 2023-09-24

## 2023-09-24 RX ORDER — SODIUM CHLORIDE 9 MG/ML
2000 INJECTION INTRAMUSCULAR; INTRAVENOUS; SUBCUTANEOUS ONCE
Refills: 0 | Status: COMPLETED | OUTPATIENT
Start: 2023-09-24 | End: 2023-09-24

## 2023-09-24 RX ORDER — CEPHALEXIN 500 MG
1 CAPSULE ORAL
Qty: 40 | Refills: 0
Start: 2023-09-24 | End: 2023-10-03

## 2023-09-24 RX ADMIN — Medication 250 MILLIGRAM(S): at 09:29

## 2023-09-24 RX ADMIN — SODIUM CHLORIDE 2000 MILLILITER(S): 9 INJECTION INTRAMUSCULAR; INTRAVENOUS; SUBCUTANEOUS at 08:12

## 2023-09-24 RX ADMIN — CEFTRIAXONE 100 MILLIGRAM(S): 500 INJECTION, POWDER, FOR SOLUTION INTRAMUSCULAR; INTRAVENOUS at 08:28

## 2023-09-24 NOTE — ED PROVIDER NOTE - CLINICAL SUMMARY MEDICAL DECISION MAKING FREE TEXT BOX
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  On exam patient is well-appearing, neurovascular intact.  Rales appreciated over the right base.  Right lower extremity edema and erythema medial calf.  Without any induration or fluctuance.  Initial labs with an elevated lactate that cleared after IV hydration.  Ultrasound and CT negative for DVT and PE.  We will treat for cellulitis.  Prescription sent all precautions reviewed

## 2023-09-24 NOTE — ED PROVIDER NOTE - OBJECTIVE STATEMENT
Patient past medical history HIV on Biktarvy with when he states mildly elevated viral loads and T cells of less than 200 on PCP prophylaxis with Bactrim history of PE and DVT on Xarelto.  Presents with worsening right lower leg swelling and pain for the last 3 days.  Admits to subjective fevers.  Also complaining of nasal congestion productive cough with green sputum.  Denies any palpitations, chest pain, shortness of breath, diaphoresis, LOC.      States he was diagnosed with a PE 1 and half months ago at [facility] was placed on Xarelto but had not been compliant with medications.  A week and a half ago presented to [facility] ER again for right lower leg pain and at that time was found to have a right lower leg DVT.  States that he has been taking his Xarelto as prescribed since the diagnosis of the DVT.  Patient follows up [facility] for HIV.

## 2023-09-24 NOTE — ED ADULT TRIAGE NOTE - CHIEF COMPLAINT QUOTE
Pt. walk in c/o pain and swelling to R. lower leg x 3 days. Pt. on Xarelto, pmh of DVT, PE, AIDS, and cholecystectomy.

## 2023-09-24 NOTE — ED PROVIDER NOTE - PROGRESS NOTE DETAILS
reviewed CT and echo results from 6/2023 on patient's e chart. CT with BL main pulmonary PE, L pulmonary infarct. R basilar opacity.   EF 65%, small pleural effusion

## 2023-09-24 NOTE — ED PROVIDER NOTE - PATIENT PORTAL LINK FT
You can access the FollowMyHealth Patient Portal offered by [facility] by registering at the following website: [email or web address]. By joining [employer]’s FollowMyHealth portal, you will also be able to view your health information using other applications (apps) compatible with our system.

## 2023-09-24 NOTE — ED ADULT NURSE NOTE - OBJECTIVE STATEMENT
pt reports right lower leg pain; noted swelling, redness, tenderness on affected area; distal CSM intact, CRT <2secs with good and equal pulses on bilateral legs; pt reports fevers at home, highest temp 101F; pt on Xarelto; pt alert & oriented x4

## 2023-09-26 DIAGNOSIS — L03.115 CELLULITIS OF RIGHT LOWER LIMB: ICD-10-CM

## 2023-09-26 DIAGNOSIS — Z20.822 CONTACT WITH AND (SUSPECTED) EXPOSURE TO COVID-19: ICD-10-CM

## 2023-09-26 DIAGNOSIS — R50.9 FEVER, UNSPECIFIED: ICD-10-CM

## 2023-09-26 DIAGNOSIS — Z86.711 PERSONAL HISTORY OF PULMONARY EMBOLISM: ICD-10-CM

## 2023-09-26 DIAGNOSIS — J06.9 ACUTE UPPER RESPIRATORY INFECTION, UNSPECIFIED: ICD-10-CM

## 2023-09-26 DIAGNOSIS — F17.200 NICOTINE DEPENDENCE, UNSPECIFIED, UNCOMPLICATED: ICD-10-CM

## 2023-09-26 DIAGNOSIS — Z21 ASYMPTOMATIC HUMAN IMMUNODEFICIENCY VIRUS [HIV] INFECTION STATUS: ICD-10-CM

## 2023-09-26 DIAGNOSIS — Z86.718 PERSONAL HISTORY OF OTHER VENOUS THROMBOSIS AND EMBOLISM: ICD-10-CM

## 2023-12-29 ENCOUNTER — INPATIENT (INPATIENT)
Facility: HOSPITAL | Age: 53
LOS: 1 days | Discharge: LEFT BEFORE DISCHARGE | DRG: 603 | End: 2023-12-31
Attending: INTERNAL MEDICINE | Admitting: INTERNAL MEDICINE
Payer: MEDICARE

## 2023-12-29 VITALS
RESPIRATION RATE: 18 BRPM | HEART RATE: 112 BPM | TEMPERATURE: 98 F | DIASTOLIC BLOOD PRESSURE: 75 MMHG | OXYGEN SATURATION: 98 % | SYSTOLIC BLOOD PRESSURE: 116 MMHG

## 2023-12-29 DIAGNOSIS — Z88.1 ALLERGY STATUS TO OTHER ANTIBIOTIC AGENTS STATUS: ICD-10-CM

## 2023-12-29 DIAGNOSIS — L03.116 CELLULITIS OF LEFT LOWER LIMB: ICD-10-CM

## 2023-12-29 DIAGNOSIS — Z86.718 PERSONAL HISTORY OF OTHER VENOUS THROMBOSIS AND EMBOLISM: ICD-10-CM

## 2023-12-29 DIAGNOSIS — B20 HUMAN IMMUNODEFICIENCY VIRUS [HIV] DISEASE: ICD-10-CM

## 2023-12-29 DIAGNOSIS — G62.9 POLYNEUROPATHY, UNSPECIFIED: ICD-10-CM

## 2023-12-29 DIAGNOSIS — L97.529 NON-PRESSURE CHRONIC ULCER OF OTHER PART OF LEFT FOOT WITH UNSPECIFIED SEVERITY: ICD-10-CM

## 2023-12-29 DIAGNOSIS — F17.210 NICOTINE DEPENDENCE, CIGARETTES, UNCOMPLICATED: ICD-10-CM

## 2023-12-29 DIAGNOSIS — Z86.711 PERSONAL HISTORY OF PULMONARY EMBOLISM: ICD-10-CM

## 2023-12-29 DIAGNOSIS — R65.10 SYSTEMIC INFLAMMATORY RESPONSE SYNDROME (SIRS) OF NON-INFECTIOUS ORIGIN WITHOUT ACUTE ORGAN DYSFUNCTION: ICD-10-CM

## 2023-12-29 DIAGNOSIS — Z79.01 LONG TERM (CURRENT) USE OF ANTICOAGULANTS: ICD-10-CM

## 2023-12-29 LAB
ALBUMIN SERPL ELPH-MCNC: 3.1 G/DL — LOW (ref 3.4–5)
ALBUMIN SERPL ELPH-MCNC: 3.1 G/DL — LOW (ref 3.4–5)
ALP SERPL-CCNC: 110 U/L — SIGNIFICANT CHANGE UP (ref 40–120)
ALP SERPL-CCNC: 110 U/L — SIGNIFICANT CHANGE UP (ref 40–120)
ALT FLD-CCNC: 8 U/L — LOW (ref 12–42)
ALT FLD-CCNC: 8 U/L — LOW (ref 12–42)
ANION GAP SERPL CALC-SCNC: 10 MMOL/L — SIGNIFICANT CHANGE UP (ref 9–16)
ANION GAP SERPL CALC-SCNC: 10 MMOL/L — SIGNIFICANT CHANGE UP (ref 9–16)
AST SERPL-CCNC: 21 U/L — SIGNIFICANT CHANGE UP (ref 15–37)
AST SERPL-CCNC: 21 U/L — SIGNIFICANT CHANGE UP (ref 15–37)
BASOPHILS # BLD AUTO: 0.03 K/UL — SIGNIFICANT CHANGE UP (ref 0–0.2)
BASOPHILS # BLD AUTO: 0.03 K/UL — SIGNIFICANT CHANGE UP (ref 0–0.2)
BASOPHILS NFR BLD AUTO: 0.3 % — SIGNIFICANT CHANGE UP (ref 0–2)
BASOPHILS NFR BLD AUTO: 0.3 % — SIGNIFICANT CHANGE UP (ref 0–2)
BILIRUB SERPL-MCNC: 0.6 MG/DL — SIGNIFICANT CHANGE UP (ref 0.2–1.2)
BILIRUB SERPL-MCNC: 0.6 MG/DL — SIGNIFICANT CHANGE UP (ref 0.2–1.2)
BUN SERPL-MCNC: 17 MG/DL — SIGNIFICANT CHANGE UP (ref 7–23)
BUN SERPL-MCNC: 17 MG/DL — SIGNIFICANT CHANGE UP (ref 7–23)
CALCIUM SERPL-MCNC: 9.2 MG/DL — SIGNIFICANT CHANGE UP (ref 8.5–10.5)
CALCIUM SERPL-MCNC: 9.2 MG/DL — SIGNIFICANT CHANGE UP (ref 8.5–10.5)
CHLORIDE SERPL-SCNC: 99 MMOL/L — SIGNIFICANT CHANGE UP (ref 96–108)
CHLORIDE SERPL-SCNC: 99 MMOL/L — SIGNIFICANT CHANGE UP (ref 96–108)
CO2 SERPL-SCNC: 24 MMOL/L — SIGNIFICANT CHANGE UP (ref 22–31)
CO2 SERPL-SCNC: 24 MMOL/L — SIGNIFICANT CHANGE UP (ref 22–31)
CREAT SERPL-MCNC: 1.18 MG/DL — SIGNIFICANT CHANGE UP (ref 0.5–1.3)
CREAT SERPL-MCNC: 1.18 MG/DL — SIGNIFICANT CHANGE UP (ref 0.5–1.3)
EGFR: 73 ML/MIN/1.73M2 — SIGNIFICANT CHANGE UP
EGFR: 73 ML/MIN/1.73M2 — SIGNIFICANT CHANGE UP
EOSINOPHIL # BLD AUTO: 0.06 K/UL — SIGNIFICANT CHANGE UP (ref 0–0.5)
EOSINOPHIL # BLD AUTO: 0.06 K/UL — SIGNIFICANT CHANGE UP (ref 0–0.5)
EOSINOPHIL NFR BLD AUTO: 0.6 % — SIGNIFICANT CHANGE UP (ref 0–6)
EOSINOPHIL NFR BLD AUTO: 0.6 % — SIGNIFICANT CHANGE UP (ref 0–6)
FLUAV AG NPH QL: SIGNIFICANT CHANGE UP
FLUAV AG NPH QL: SIGNIFICANT CHANGE UP
FLUBV AG NPH QL: SIGNIFICANT CHANGE UP
FLUBV AG NPH QL: SIGNIFICANT CHANGE UP
GLUCOSE SERPL-MCNC: 116 MG/DL — HIGH (ref 70–99)
GLUCOSE SERPL-MCNC: 116 MG/DL — HIGH (ref 70–99)
HCT VFR BLD CALC: 38.9 % — LOW (ref 39–50)
HCT VFR BLD CALC: 38.9 % — LOW (ref 39–50)
HGB BLD-MCNC: 13.1 G/DL — SIGNIFICANT CHANGE UP (ref 13–17)
HGB BLD-MCNC: 13.1 G/DL — SIGNIFICANT CHANGE UP (ref 13–17)
IMM GRANULOCYTES NFR BLD AUTO: 0.6 % — SIGNIFICANT CHANGE UP (ref 0–0.9)
IMM GRANULOCYTES NFR BLD AUTO: 0.6 % — SIGNIFICANT CHANGE UP (ref 0–0.9)
LACTATE BLDV-MCNC: 1.2 MMOL/L — SIGNIFICANT CHANGE UP (ref 0.5–2)
LACTATE BLDV-MCNC: 1.2 MMOL/L — SIGNIFICANT CHANGE UP (ref 0.5–2)
LYMPHOCYTES # BLD AUTO: 1.81 K/UL — SIGNIFICANT CHANGE UP (ref 1–3.3)
LYMPHOCYTES # BLD AUTO: 1.81 K/UL — SIGNIFICANT CHANGE UP (ref 1–3.3)
LYMPHOCYTES # BLD AUTO: 16.8 % — SIGNIFICANT CHANGE UP (ref 13–44)
LYMPHOCYTES # BLD AUTO: 16.8 % — SIGNIFICANT CHANGE UP (ref 13–44)
MCHC RBC-ENTMCNC: 29.6 PG — SIGNIFICANT CHANGE UP (ref 27–34)
MCHC RBC-ENTMCNC: 29.6 PG — SIGNIFICANT CHANGE UP (ref 27–34)
MCHC RBC-ENTMCNC: 33.7 GM/DL — SIGNIFICANT CHANGE UP (ref 32–36)
MCHC RBC-ENTMCNC: 33.7 GM/DL — SIGNIFICANT CHANGE UP (ref 32–36)
MCV RBC AUTO: 88 FL — SIGNIFICANT CHANGE UP (ref 80–100)
MCV RBC AUTO: 88 FL — SIGNIFICANT CHANGE UP (ref 80–100)
MONOCYTES # BLD AUTO: 0.81 K/UL — SIGNIFICANT CHANGE UP (ref 0–0.9)
MONOCYTES # BLD AUTO: 0.81 K/UL — SIGNIFICANT CHANGE UP (ref 0–0.9)
MONOCYTES NFR BLD AUTO: 7.5 % — SIGNIFICANT CHANGE UP (ref 2–14)
MONOCYTES NFR BLD AUTO: 7.5 % — SIGNIFICANT CHANGE UP (ref 2–14)
NEUTROPHILS # BLD AUTO: 8.01 K/UL — HIGH (ref 1.8–7.4)
NEUTROPHILS # BLD AUTO: 8.01 K/UL — HIGH (ref 1.8–7.4)
NEUTROPHILS NFR BLD AUTO: 74.2 % — SIGNIFICANT CHANGE UP (ref 43–77)
NEUTROPHILS NFR BLD AUTO: 74.2 % — SIGNIFICANT CHANGE UP (ref 43–77)
NRBC # BLD: 0 /100 WBCS — SIGNIFICANT CHANGE UP (ref 0–0)
NRBC # BLD: 0 /100 WBCS — SIGNIFICANT CHANGE UP (ref 0–0)
PLATELET # BLD AUTO: 255 K/UL — SIGNIFICANT CHANGE UP (ref 150–400)
PLATELET # BLD AUTO: 255 K/UL — SIGNIFICANT CHANGE UP (ref 150–400)
POTASSIUM SERPL-MCNC: 3.8 MMOL/L — SIGNIFICANT CHANGE UP (ref 3.5–5.3)
POTASSIUM SERPL-MCNC: 3.8 MMOL/L — SIGNIFICANT CHANGE UP (ref 3.5–5.3)
POTASSIUM SERPL-SCNC: 3.8 MMOL/L — SIGNIFICANT CHANGE UP (ref 3.5–5.3)
POTASSIUM SERPL-SCNC: 3.8 MMOL/L — SIGNIFICANT CHANGE UP (ref 3.5–5.3)
PROT SERPL-MCNC: 8.4 G/DL — HIGH (ref 6.4–8.2)
PROT SERPL-MCNC: 8.4 G/DL — HIGH (ref 6.4–8.2)
RBC # BLD: 4.42 M/UL — SIGNIFICANT CHANGE UP (ref 4.2–5.8)
RBC # BLD: 4.42 M/UL — SIGNIFICANT CHANGE UP (ref 4.2–5.8)
RBC # FLD: 14.6 % — HIGH (ref 10.3–14.5)
RBC # FLD: 14.6 % — HIGH (ref 10.3–14.5)
RSV RNA NPH QL NAA+NON-PROBE: SIGNIFICANT CHANGE UP
RSV RNA NPH QL NAA+NON-PROBE: SIGNIFICANT CHANGE UP
SARS-COV-2 RNA SPEC QL NAA+PROBE: SIGNIFICANT CHANGE UP
SARS-COV-2 RNA SPEC QL NAA+PROBE: SIGNIFICANT CHANGE UP
SODIUM SERPL-SCNC: 133 MMOL/L — SIGNIFICANT CHANGE UP (ref 132–145)
SODIUM SERPL-SCNC: 133 MMOL/L — SIGNIFICANT CHANGE UP (ref 132–145)
WBC # BLD: 10.78 K/UL — HIGH (ref 3.8–10.5)
WBC # BLD: 10.78 K/UL — HIGH (ref 3.8–10.5)
WBC # FLD AUTO: 10.78 K/UL — HIGH (ref 3.8–10.5)
WBC # FLD AUTO: 10.78 K/UL — HIGH (ref 3.8–10.5)

## 2023-12-29 PROCEDURE — 73620 X-RAY EXAM OF FOOT: CPT | Mod: 26,LT

## 2023-12-29 PROCEDURE — 93971 EXTREMITY STUDY: CPT | Mod: 26,LT

## 2023-12-29 PROCEDURE — 99285 EMERGENCY DEPT VISIT HI MDM: CPT

## 2023-12-29 RX ORDER — LANOLIN ALCOHOL/MO/W.PET/CERES
3 CREAM (GRAM) TOPICAL AT BEDTIME
Refills: 0 | Status: DISCONTINUED | OUTPATIENT
Start: 2023-12-29 | End: 2023-12-31

## 2023-12-29 RX ORDER — ACETAMINOPHEN 500 MG
650 TABLET ORAL EVERY 6 HOURS
Refills: 0 | Status: DISCONTINUED | OUTPATIENT
Start: 2023-12-29 | End: 2023-12-31

## 2023-12-29 RX ORDER — ONDANSETRON 8 MG/1
4 TABLET, FILM COATED ORAL EVERY 8 HOURS
Refills: 0 | Status: DISCONTINUED | OUTPATIENT
Start: 2023-12-29 | End: 2023-12-31

## 2023-12-29 RX ORDER — VANCOMYCIN HCL 1 G
1000 VIAL (EA) INTRAVENOUS ONCE
Refills: 0 | Status: COMPLETED | OUTPATIENT
Start: 2023-12-29 | End: 2023-12-29

## 2023-12-29 RX ADMIN — Medication 100 MILLIGRAM(S): at 17:34

## 2023-12-29 RX ADMIN — Medication 250 MILLIGRAM(S): at 18:22

## 2023-12-29 NOTE — ED ADULT NURSE NOTE - NSFALLRISKINTERV_ED_ALL_ED
Assistance OOB with selected safe patient handling equipment if applicable/Assistance with ambulation/Communicate fall risk and risk factors to all staff, patient, and family/Provide visual cue: yellow wristband, yellow gown, etc/Reinforce activity limits and safety measures with patient and family/Call bell, personal items and telephone in reach/Instruct patient to call for assistance before getting out of bed/chair/stretcher/Non-slip footwear applied when patient is off stretcher/[place] to call system/Physically safe environment - no spills, clutter or unnecessary equipment/Purposeful Proactive Rounding/Room/bathroom lighting operational, light cord in reach Assistance OOB with selected safe patient handling equipment if applicable/Assistance with ambulation/Communicate fall risk and risk factors to all staff, patient, and family/Provide visual cue: yellow wristband, yellow gown, etc/Reinforce activity limits and safety measures with patient and family/Call bell, personal items and telephone in reach/Instruct patient to call for assistance before getting out of bed/chair/stretcher/Non-slip footwear applied when patient is off stretcher/[place] to call system/Physically safe environment - no spills, clutter or unnecessary equipment/Purposeful Proactive Rounding/Room/bathroom lighting operational, light cord in reach

## 2023-12-29 NOTE — ED ADULT TRIAGE NOTE - CHIEF COMPLAINT QUOTE
Pt complaining of left leg pain. Pt with redness streaking up leg. PT with history of osteomyelitis to left foot.

## 2023-12-29 NOTE — ED PROVIDER NOTE - OBJECTIVE STATEMENT
Patient is a pleasant 53-year-old male who is complaining of his left lower extremity being swollen since Thursday morning.  He also states that he has a hole in his left big toe since September.  He was using a new callus  on his foot Tuesday and he also grind did his leg and he thinks this may be the source of the left lower leg redness and swelling.  No fever.  He does have a history of DVT and he does take Xarelto daily.  States T-cell count is in the 200s and Viral Load undetectable.    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  He also states that he has a hole in his left big toe since September.  He was using a new callus  on his foot Tuesday and he also grind did his leg and he thinks this may be the source of the left lower leg redness and swelling.  No fever.  He does have a history of DVT and he does take Xarelto daily.  States T-cell count is in the 200s and Viral Load undetectable.    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

## 2023-12-29 NOTE — ED PROVIDER NOTE - PHYSICAL EXAMINATION
General: Patient is A&O x 3 in NAD  Head: NC/AT;  Eyes: EOMI, no lid lag, no proptosis  Ears: Normal  Nose: Normal  Neck: Normal ROM  Lungs: Normal respiratory effort  Heart: Normal rate, no peripheral edema  Neuro: Gait normal, nonfocal  Skin: No rash, lesions or ulcers  Extremity:  Left lower extremity (below the knee) is red, warm and swollen; the left great toe does have a toe with some blackness  Psych: Normal affect and behavior, intact judgement and insight

## 2023-12-29 NOTE — ED PROVIDER NOTE - NSICDXPASTMEDICALHX_GEN_ALL_CORE_FT
PAST MEDICAL HISTORY:  DVT, lower extremity     Human immunodeficiency virus (HIV) infection     Osteomyelitis     Pulmonary embolism

## 2023-12-29 NOTE — ED PROVIDER NOTE - CLINICAL SUMMARY MEDICAL DECISION MAKING FREE TEXT BOX
Patient is a pleasant 53-year-old male who is complaining of his left lower extremity being swollen since Thursday morning.  He also states that he has a hole in his left big toe since September.  He was using a new callus  on his foot Tuesday and he also grind did his leg and he thinks this may be the source of the left lower leg redness and swelling.  No fever.  He does have a history of DVT and he does take Xarelto daily.  States T-cell count is in the 200s and Viral Load undetectable.  On exam, left lower leg is red, warm and edemetous - consistent with cellulitis  Labs ordered  Abx ordered  Imaging ordered Patient is a pleasant 53-year-old male who is complaining of his left lower extremity being swollen since Thursday morning.  He also states that he has a hole in his left big toe since September.  He was using a new callus  on his foot Tuesday and he also grind did his leg and he thinks this may be the source of the left lower leg redness and swelling.  No fever.  He does have a history of DVT and he does take Xarelto daily.  States T-cell count is in the 200s and Viral Load undetectable.  On exam, left lower leg is red, warm and edemetous - consistent with cellulitis  Labs ordered  Abx ordered  Imaging ordered  Because patient is immunocompromised, will need IV antibiotics.

## 2023-12-30 DIAGNOSIS — L03.90 CELLULITIS, UNSPECIFIED: ICD-10-CM

## 2023-12-30 DIAGNOSIS — B20 HUMAN IMMUNODEFICIENCY VIRUS [HIV] DISEASE: ICD-10-CM

## 2023-12-30 DIAGNOSIS — Z87.39 PERSONAL HISTORY OF OTHER DISEASES OF THE MUSCULOSKELETAL SYSTEM AND CONNECTIVE TISSUE: ICD-10-CM

## 2023-12-30 DIAGNOSIS — I26.99 OTHER PULMONARY EMBOLISM WITHOUT ACUTE COR PULMONALE: ICD-10-CM

## 2023-12-30 DIAGNOSIS — Z29.9 ENCOUNTER FOR PROPHYLACTIC MEASURES, UNSPECIFIED: ICD-10-CM

## 2023-12-30 DIAGNOSIS — R65.10 SYSTEMIC INFLAMMATORY RESPONSE SYNDROME (SIRS) OF NON-INFECTIOUS ORIGIN WITHOUT ACUTE ORGAN DYSFUNCTION: ICD-10-CM

## 2023-12-30 LAB
ALBUMIN SERPL ELPH-MCNC: 2.9 G/DL — LOW (ref 3.3–5)
ALBUMIN SERPL ELPH-MCNC: 2.9 G/DL — LOW (ref 3.3–5)
ALP SERPL-CCNC: 97 U/L — SIGNIFICANT CHANGE UP (ref 40–120)
ALP SERPL-CCNC: 97 U/L — SIGNIFICANT CHANGE UP (ref 40–120)
ALT FLD-CCNC: 5 U/L — LOW (ref 10–45)
ALT FLD-CCNC: 5 U/L — LOW (ref 10–45)
ANION GAP SERPL CALC-SCNC: 8 MMOL/L — SIGNIFICANT CHANGE UP (ref 5–17)
ANION GAP SERPL CALC-SCNC: 8 MMOL/L — SIGNIFICANT CHANGE UP (ref 5–17)
AST SERPL-CCNC: 11 U/L — SIGNIFICANT CHANGE UP (ref 10–40)
AST SERPL-CCNC: 11 U/L — SIGNIFICANT CHANGE UP (ref 10–40)
BASOPHILS # BLD AUTO: 0.02 K/UL — SIGNIFICANT CHANGE UP (ref 0–0.2)
BASOPHILS # BLD AUTO: 0.02 K/UL — SIGNIFICANT CHANGE UP (ref 0–0.2)
BASOPHILS NFR BLD AUTO: 0.3 % — SIGNIFICANT CHANGE UP (ref 0–2)
BASOPHILS NFR BLD AUTO: 0.3 % — SIGNIFICANT CHANGE UP (ref 0–2)
BILIRUB SERPL-MCNC: 0.6 MG/DL — SIGNIFICANT CHANGE UP (ref 0.2–1.2)
BILIRUB SERPL-MCNC: 0.6 MG/DL — SIGNIFICANT CHANGE UP (ref 0.2–1.2)
BUN SERPL-MCNC: 13 MG/DL — SIGNIFICANT CHANGE UP (ref 7–23)
BUN SERPL-MCNC: 13 MG/DL — SIGNIFICANT CHANGE UP (ref 7–23)
CALCIUM SERPL-MCNC: 9 MG/DL — SIGNIFICANT CHANGE UP (ref 8.4–10.5)
CALCIUM SERPL-MCNC: 9 MG/DL — SIGNIFICANT CHANGE UP (ref 8.4–10.5)
CHLORIDE SERPL-SCNC: 104 MMOL/L — SIGNIFICANT CHANGE UP (ref 96–108)
CHLORIDE SERPL-SCNC: 104 MMOL/L — SIGNIFICANT CHANGE UP (ref 96–108)
CO2 SERPL-SCNC: 25 MMOL/L — SIGNIFICANT CHANGE UP (ref 22–31)
CO2 SERPL-SCNC: 25 MMOL/L — SIGNIFICANT CHANGE UP (ref 22–31)
CREAT SERPL-MCNC: 1.06 MG/DL — SIGNIFICANT CHANGE UP (ref 0.5–1.3)
CREAT SERPL-MCNC: 1.06 MG/DL — SIGNIFICANT CHANGE UP (ref 0.5–1.3)
CRP SERPL-MCNC: 135.6 MG/L — HIGH (ref 0–4)
CRP SERPL-MCNC: 135.6 MG/L — HIGH (ref 0–4)
EGFR: 83 ML/MIN/1.73M2 — SIGNIFICANT CHANGE UP
EGFR: 83 ML/MIN/1.73M2 — SIGNIFICANT CHANGE UP
EOSINOPHIL # BLD AUTO: 0.09 K/UL — SIGNIFICANT CHANGE UP (ref 0–0.5)
EOSINOPHIL # BLD AUTO: 0.09 K/UL — SIGNIFICANT CHANGE UP (ref 0–0.5)
EOSINOPHIL NFR BLD AUTO: 1.2 % — SIGNIFICANT CHANGE UP (ref 0–6)
EOSINOPHIL NFR BLD AUTO: 1.2 % — SIGNIFICANT CHANGE UP (ref 0–6)
ERYTHROCYTE [SEDIMENTATION RATE] IN BLOOD: 77 MM/HR — HIGH
ERYTHROCYTE [SEDIMENTATION RATE] IN BLOOD: 77 MM/HR — HIGH
GLUCOSE SERPL-MCNC: 98 MG/DL — SIGNIFICANT CHANGE UP (ref 70–99)
GLUCOSE SERPL-MCNC: 98 MG/DL — SIGNIFICANT CHANGE UP (ref 70–99)
HCT VFR BLD CALC: 38.8 % — LOW (ref 39–50)
HCT VFR BLD CALC: 38.8 % — LOW (ref 39–50)
HGB BLD-MCNC: 12.8 G/DL — LOW (ref 13–17)
HGB BLD-MCNC: 12.8 G/DL — LOW (ref 13–17)
IMM GRANULOCYTES NFR BLD AUTO: 0.3 % — SIGNIFICANT CHANGE UP (ref 0–0.9)
IMM GRANULOCYTES NFR BLD AUTO: 0.3 % — SIGNIFICANT CHANGE UP (ref 0–0.9)
LYMPHOCYTES # BLD AUTO: 1.5 K/UL — SIGNIFICANT CHANGE UP (ref 1–3.3)
LYMPHOCYTES # BLD AUTO: 1.5 K/UL — SIGNIFICANT CHANGE UP (ref 1–3.3)
LYMPHOCYTES # BLD AUTO: 20 % — SIGNIFICANT CHANGE UP (ref 13–44)
LYMPHOCYTES # BLD AUTO: 20 % — SIGNIFICANT CHANGE UP (ref 13–44)
MAGNESIUM SERPL-MCNC: 2 MG/DL — SIGNIFICANT CHANGE UP (ref 1.6–2.6)
MAGNESIUM SERPL-MCNC: 2 MG/DL — SIGNIFICANT CHANGE UP (ref 1.6–2.6)
MCHC RBC-ENTMCNC: 29 PG — SIGNIFICANT CHANGE UP (ref 27–34)
MCHC RBC-ENTMCNC: 29 PG — SIGNIFICANT CHANGE UP (ref 27–34)
MCHC RBC-ENTMCNC: 33 GM/DL — SIGNIFICANT CHANGE UP (ref 32–36)
MCHC RBC-ENTMCNC: 33 GM/DL — SIGNIFICANT CHANGE UP (ref 32–36)
MCV RBC AUTO: 87.8 FL — SIGNIFICANT CHANGE UP (ref 80–100)
MCV RBC AUTO: 87.8 FL — SIGNIFICANT CHANGE UP (ref 80–100)
MONOCYTES # BLD AUTO: 0.73 K/UL — SIGNIFICANT CHANGE UP (ref 0–0.9)
MONOCYTES # BLD AUTO: 0.73 K/UL — SIGNIFICANT CHANGE UP (ref 0–0.9)
MONOCYTES NFR BLD AUTO: 9.7 % — SIGNIFICANT CHANGE UP (ref 2–14)
MONOCYTES NFR BLD AUTO: 9.7 % — SIGNIFICANT CHANGE UP (ref 2–14)
NEUTROPHILS # BLD AUTO: 5.13 K/UL — SIGNIFICANT CHANGE UP (ref 1.8–7.4)
NEUTROPHILS # BLD AUTO: 5.13 K/UL — SIGNIFICANT CHANGE UP (ref 1.8–7.4)
NEUTROPHILS NFR BLD AUTO: 68.5 % — SIGNIFICANT CHANGE UP (ref 43–77)
NEUTROPHILS NFR BLD AUTO: 68.5 % — SIGNIFICANT CHANGE UP (ref 43–77)
NRBC # BLD: 0 /100 WBCS — SIGNIFICANT CHANGE UP (ref 0–0)
NRBC # BLD: 0 /100 WBCS — SIGNIFICANT CHANGE UP (ref 0–0)
PHOSPHATE SERPL-MCNC: 2.1 MG/DL — LOW (ref 2.5–4.5)
PHOSPHATE SERPL-MCNC: 2.1 MG/DL — LOW (ref 2.5–4.5)
PLATELET # BLD AUTO: 214 K/UL — SIGNIFICANT CHANGE UP (ref 150–400)
PLATELET # BLD AUTO: 214 K/UL — SIGNIFICANT CHANGE UP (ref 150–400)
POTASSIUM SERPL-MCNC: 4 MMOL/L — SIGNIFICANT CHANGE UP (ref 3.5–5.3)
POTASSIUM SERPL-MCNC: 4 MMOL/L — SIGNIFICANT CHANGE UP (ref 3.5–5.3)
POTASSIUM SERPL-SCNC: 4 MMOL/L — SIGNIFICANT CHANGE UP (ref 3.5–5.3)
POTASSIUM SERPL-SCNC: 4 MMOL/L — SIGNIFICANT CHANGE UP (ref 3.5–5.3)
PROT SERPL-MCNC: 7.3 G/DL — SIGNIFICANT CHANGE UP (ref 6–8.3)
PROT SERPL-MCNC: 7.3 G/DL — SIGNIFICANT CHANGE UP (ref 6–8.3)
RBC # BLD: 4.42 M/UL — SIGNIFICANT CHANGE UP (ref 4.2–5.8)
RBC # BLD: 4.42 M/UL — SIGNIFICANT CHANGE UP (ref 4.2–5.8)
RBC # FLD: 14.5 % — SIGNIFICANT CHANGE UP (ref 10.3–14.5)
RBC # FLD: 14.5 % — SIGNIFICANT CHANGE UP (ref 10.3–14.5)
SODIUM SERPL-SCNC: 137 MMOL/L — SIGNIFICANT CHANGE UP (ref 135–145)
SODIUM SERPL-SCNC: 137 MMOL/L — SIGNIFICANT CHANGE UP (ref 135–145)
WBC # BLD: 7.49 K/UL — SIGNIFICANT CHANGE UP (ref 3.8–10.5)
WBC # BLD: 7.49 K/UL — SIGNIFICANT CHANGE UP (ref 3.8–10.5)
WBC # FLD AUTO: 7.49 K/UL — SIGNIFICANT CHANGE UP (ref 3.8–10.5)
WBC # FLD AUTO: 7.49 K/UL — SIGNIFICANT CHANGE UP (ref 3.8–10.5)

## 2023-12-30 PROCEDURE — 99233 SBSQ HOSP IP/OBS HIGH 50: CPT

## 2023-12-30 PROCEDURE — 93010 ELECTROCARDIOGRAM REPORT: CPT

## 2023-12-30 RX ORDER — PANTOPRAZOLE SODIUM 20 MG/1
1 TABLET, DELAYED RELEASE ORAL
Refills: 0 | DISCHARGE

## 2023-12-30 RX ORDER — APIXABAN 2.5 MG/1
5 TABLET, FILM COATED ORAL EVERY 12 HOURS
Refills: 0 | Status: DISCONTINUED | OUTPATIENT
Start: 2023-12-30 | End: 2023-12-31

## 2023-12-30 RX ORDER — PIPERACILLIN AND TAZOBACTAM 4; .5 G/20ML; G/20ML
3.38 INJECTION, POWDER, LYOPHILIZED, FOR SOLUTION INTRAVENOUS EVERY 8 HOURS
Refills: 0 | Status: DISCONTINUED | OUTPATIENT
Start: 2023-12-30 | End: 2023-12-31

## 2023-12-30 RX ORDER — SODIUM CHLORIDE 9 MG/ML
1000 INJECTION INTRAMUSCULAR; INTRAVENOUS; SUBCUTANEOUS
Refills: 0 | Status: DISCONTINUED | OUTPATIENT
Start: 2023-12-30 | End: 2023-12-31

## 2023-12-30 RX ORDER — PIPERACILLIN AND TAZOBACTAM 4; .5 G/20ML; G/20ML
3.38 INJECTION, POWDER, LYOPHILIZED, FOR SOLUTION INTRAVENOUS ONCE
Refills: 0 | Status: COMPLETED | OUTPATIENT
Start: 2023-12-30 | End: 2023-12-30

## 2023-12-30 RX ORDER — GABAPENTIN 400 MG/1
100 CAPSULE ORAL DAILY
Refills: 0 | Status: DISCONTINUED | OUTPATIENT
Start: 2023-12-30 | End: 2023-12-31

## 2023-12-30 RX ORDER — APIXABAN 2.5 MG/1
1 TABLET, FILM COATED ORAL
Refills: 0 | DISCHARGE

## 2023-12-30 RX ORDER — BICTEGRAVIR SODIUM, EMTRICITABINE, AND TENOFOVIR ALAFENAMIDE FUMARATE 30; 120; 15 MG/1; MG/1; MG/1
1 TABLET ORAL DAILY
Refills: 0 | Status: DISCONTINUED | OUTPATIENT
Start: 2023-12-30 | End: 2023-12-31

## 2023-12-30 RX ORDER — NICOTINE POLACRILEX 2 MG
1 GUM BUCCAL DAILY
Refills: 0 | Status: DISCONTINUED | OUTPATIENT
Start: 2023-12-30 | End: 2023-12-31

## 2023-12-30 RX ORDER — VANCOMYCIN HCL 1 G
1250 VIAL (EA) INTRAVENOUS EVERY 12 HOURS
Refills: 0 | Status: COMPLETED | OUTPATIENT
Start: 2023-12-30 | End: 2023-12-30

## 2023-12-30 RX ADMIN — PIPERACILLIN AND TAZOBACTAM 25 GRAM(S): 4; .5 INJECTION, POWDER, LYOPHILIZED, FOR SOLUTION INTRAVENOUS at 14:30

## 2023-12-30 RX ADMIN — Medication 1 TABLET(S): at 12:51

## 2023-12-30 RX ADMIN — GABAPENTIN 100 MILLIGRAM(S): 400 CAPSULE ORAL at 12:51

## 2023-12-30 RX ADMIN — Medication 85 MILLIMOLE(S): at 12:50

## 2023-12-30 RX ADMIN — Medication 166.67 MILLIGRAM(S): at 23:50

## 2023-12-30 RX ADMIN — Medication 166.67 MILLIGRAM(S): at 09:38

## 2023-12-30 RX ADMIN — PIPERACILLIN AND TAZOBACTAM 200 GRAM(S): 4; .5 INJECTION, POWDER, LYOPHILIZED, FOR SOLUTION INTRAVENOUS at 06:42

## 2023-12-30 RX ADMIN — APIXABAN 5 MILLIGRAM(S): 2.5 TABLET, FILM COATED ORAL at 06:43

## 2023-12-30 RX ADMIN — BICTEGRAVIR SODIUM, EMTRICITABINE, AND TENOFOVIR ALAFENAMIDE FUMARATE 1 TABLET(S): 30; 120; 15 TABLET ORAL at 13:44

## 2023-12-30 RX ADMIN — Medication 1 PATCH: at 12:04

## 2023-12-30 RX ADMIN — APIXABAN 5 MILLIGRAM(S): 2.5 TABLET, FILM COATED ORAL at 17:28

## 2023-12-30 RX ADMIN — Medication 1 PATCH: at 17:38

## 2023-12-30 RX ADMIN — PIPERACILLIN AND TAZOBACTAM 25 GRAM(S): 4; .5 INJECTION, POWDER, LYOPHILIZED, FOR SOLUTION INTRAVENOUS at 11:18

## 2023-12-30 NOTE — H&P ADULT - ASSESSMENT
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

## 2023-12-30 NOTE — H&P ADULT - HISTORY OF PRESENT ILLNESS
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 Per patient, he was using a callas roller on 12/27 on his L foot previous wound and shin to remove dead skin. Patient has a known non-purulent dark, eschar 1cm lesion under his 1st digit. Over the next two days, the left leg became more swollen, red, and painful causing difficulty while ambulating, prompting the ED visit. Patient states his wounds have always been clean. Patient takes Biktarvy, Bactrim, Eliquis, and PPI all at once in the morning. He misses his medications 5-7 times in a month since he takes them all together. Patient denies fevers or chills at home.     ED Course:  Vitals: 98.4 F, , /75, RR 18(99%) on Room air  Labs: WBC 10.7, Glucose 116, Albumin 3.1, Lactate 1.2  Imaging: LLE duplex negative for DVT. L XR foot - 3rd digit resection. Hallux IP eroded, infx vs inflammatory. Small well marginated defect/erosion along proximal lateral aspect of 5th   proximal distal phalanx base.  Consults: None  Interventions: Clindamycin 600mg, Vanc 1000g

## 2023-12-30 NOTE — CONSULT NOTE ADULT - SUBJECTIVE AND OBJECTIVE BOX
Attending: Dr. 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 Per patient, he was using a callas roller on 12/27 on his L foot previous wound and shin to remove dead skin. Patient has a known non-purulent dark, eschar 1cm lesion under his 1st digit. Over the next two days, the left leg became more swollen, red, and painful causing difficulty while ambulating, prompting the ED visit. Patient states his wounds have always been clean. Patient takes Biktarvy, Bactrim, Eliquis, and PPI all at once in the morning. He misses his medications 5-7 times in a month since he takes them all together. Patient denies fevers or chills at home.     Podiatry Addendum: Pt was seen during AM rounds by on-call resident. Pt states that he underwent a third digit amputation 5 years ago due to OM. The pt states that his ulcer was present since September 2023. On Tuesday he used a callus removal to help remove the necrotic tissue present. He then noticed severe redness and swelling in the left leg. He denies any drainage or pus. However the pt states that he does notice a stabbing pain which radiates uo his leg. Pain is improved with elevation.. He is currently being followed by [facility].    ED Course:  Vitals: 98.4 F, , /75, RR 18(99%) on Room air  Labs: WBC 10.7, Glucose 116, Albumin 3.1, Lactate 1.2  Imaging: LLE duplex negative for DVT. L XR foot - 3rd digit resection. Hallux IP eroded, infx vs inflammatory. Small well marginated defect/erosion along proximal lateral aspect of 5th   proximal distal phalanx base.  Consults: None  Interventions: Clindamycin 600mg, Vanc 1000g (30 Dec 2023 00:26)      Review of systems negative except per HPI and as stated below  General:	 no weakness; no fevers, no chills  Skin/Breast: no rash  Respiratory and Thorax: no SOB, no cough  Cardiovascular:	No chest pain  Gastrointestinal:	 no nausea, vomiting , diarrhea  Genitourinary:	no dysuria, no difficulty urinating, no hematuria  Musculoskeletal:	no weakness, no joint swelling/pain  Neurological:	no focal weakness/numbness  Endocrine:	no polyuria, no polydipsia    PAST MEDICAL & SURGICAL HISTORY:  Human immunodeficiency virus (HIV) infection      Osteomyelitis      Pulmonary embolism      DVT, lower extremity        Home Medications:  Eliquis 5 mg oral tablet: 1 tab(s) orally once a day (30 Dec 2023 00:47)  elvitegravir/cobicistat/emtricitabine/tenofovir alafenamide 150 mg-150 mg-200 mg-10 mg oral tablet: 1 tab(s) orally once a day (14 Jan 2019 13:49)  nicotine 14 mg/24 hr transdermal film, extended release: 1  transdermal once a day (14 Jan 2019 13:49)  pantoprazole 40 mg oral delayed release tablet: 1 tab(s) orally once a day (30 Dec 2023 00:48)    Allergies    cefepime (Unknown)    Intolerances      FAMILY HISTORY:  No pertinent family history in first degree relatives      Social History:       LABS                        12.8   7.49  )-----------( 214      ( 30 Dec 2023 05:30 )             38.8     12-30    137  |  104  |  13  ----------------------------<  98  4.0   |  25  |  1.06    Ca    9.0      30 Dec 2023 05:30  Phos  2.1     12-30  Mg     2.0     12-30    TPro  7.3  /  Alb  2.9<L>  /  TBili  0.6  /  DBili  x   /  AST  11  /  ALT  5<L>  /  AlkPhos  97  12-30      ESR: 77  CRP: --  12-30 @ 05:30    Vital Signs Last 24 Hrs  T(C): 36.8 (30 Dec 2023 12:15), Max: 37.1 (29 Dec 2023 21:29)  T(F): 98.3 (30 Dec 2023 12:15), Max: 98.7 (29 Dec 2023 21:29)  HR: 96 (30 Dec 2023 12:15) (89 - 112)  BP: 104/59 (30 Dec 2023 12:15) (100/61 - 116/75)  BP(mean): --  RR: 19 (30 Dec 2023 12:15) (16 - 19)  SpO2: 95% (30 Dec 2023 12:15) (95% - 98%)    Parameters below as of 30 Dec 2023 12:15  Patient On (Oxygen Delivery Method): room air        PHYSICAL EXAM  General: NAD, AA0x3    Lower Extremity Focused:  Vasc: +2 pitting edema on left leg extending to inferior aspect of knee, PT/DP 2/4 TG warm to warm   Derm: 1.5 cm * 1.3 cm necrotic lesion noted on the plantar aspect of left hallux. Atrophic hyperpigmented circumscribed skin lesions found on dorsal aspect of legs b/l (-) PTB   Neuro: Protective sensation is intact   MSK: 5/5 muscle strength in all compartments       RADIOLOGY  < from: Xray Foot AP + Lateral, Left (12.29.23 @ 17:04) >    IMPRESSION:  Soft tissue swelling.    Subtotal 3rd ray resection through distal metatarsal with smooth tapered   corticated stump margin and preserved overlying soft tissue coverage.   Hallux IP joint valgus deformity with arthritic and slightly eroded   indistinct articular margins, infectious versus inflammatory etiologies   not excluded, correlate clinically with MRI suggested to further assess   if warranted.    Small well marginated defect/erosion along proximal lateral aspect of 5th   proximal distal phalanx base.    No acute fractures or dislocations.    Tarsometatarsal alignment maintained without evidence for a Lisfranc   injury. Preserved remaining visualized foot spaces and no additional   suspected joint margin erosions.    No calcaneal spurring.    No discrete lytic or blastic lesions.    < end of copied text >                       Attending: Dr. 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 Per patient, he was using a callas roller on 12/27 on his L foot previous wound and shin to remove dead skin. Patient has a known non-purulent dark, eschar 1cm lesion under his 1st digit. Over the next two days, the left leg became more swollen, red, and painful causing difficulty while ambulating, prompting the ED visit. Patient states his wounds have always been clean. Patient takes Biktarvy, Bactrim, Eliquis, and PPI all at once in the morning. He misses his medications 5-7 times in a month since he takes them all together. Patient denies fevers or chills at home.     Podiatry Addendum: Pt was seen during AM rounds by on-call resident. Pt states that he underwent a third digit amputation 5 years ago due to OM. The pt states that his ulcer was present since September 2023. On Tuesday he used a callus removal to help remove the necrotic tissue present. He then noticed severe redness and swelling in the left leg. He denies any drainage or pus. However the pt states that he does notice a stabbing pain which radiates uo his leg. Pain is improved with elevation.. He is currently being followed by [facility].    ED Course:  Vitals: 98.4 F, , /75, RR 18(99%) on Room air  Labs: WBC 10.7, Glucose 116, Albumin 3.1, Lactate 1.2  Imaging: LLE duplex negative for DVT. L XR foot - 3rd digit resection. Hallux IP eroded, infx vs inflammatory. Small well marginated defect/erosion along proximal lateral aspect of 5th   proximal distal phalanx base.  Consults: None  Interventions: Clindamycin 600mg, Vanc 1000g (30 Dec 2023 00:26)      Review of systems negative except per HPI and as stated below  General:	 no weakness; no fevers, no chills  Skin/Breast: no rash  Respiratory and Thorax: no SOB, no cough  Cardiovascular:	No chest pain  Gastrointestinal:	 no nausea, vomiting , diarrhea  Genitourinary:	no dysuria, no difficulty urinating, no hematuria  Musculoskeletal:	no weakness, no joint swelling/pain  Neurological:	no focal weakness/numbness  Endocrine:	no polyuria, no polydipsia    PAST MEDICAL & SURGICAL HISTORY:  Human immunodeficiency virus (HIV) infection      Osteomyelitis      Pulmonary embolism      DVT, lower extremity        Home Medications:  Eliquis 5 mg oral tablet: 1 tab(s) orally once a day (30 Dec 2023 00:47)  elvitegravir/cobicistat/emtricitabine/tenofovir alafenamide 150 mg-150 mg-200 mg-10 mg oral tablet: 1 tab(s) orally once a day (14 Jan 2019 13:49)  nicotine 14 mg/24 hr transdermal film, extended release: 1  transdermal once a day (14 Jan 2019 13:49)  pantoprazole 40 mg oral delayed release tablet: 1 tab(s) orally once a day (30 Dec 2023 00:48)    Allergies    cefepime (Unknown)    Intolerances      FAMILY HISTORY:  No pertinent family history in first degree relatives      Social History:       LABS                        12.8   7.49  )-----------( 214      ( 30 Dec 2023 05:30 )             38.8     12-30    137  |  104  |  13  ----------------------------<  98  4.0   |  25  |  1.06    Ca    9.0      30 Dec 2023 05:30  Phos  2.1     12-30  Mg     2.0     12-30    TPro  7.3  /  Alb  2.9<L>  /  TBili  0.6  /  DBili  x   /  AST  11  /  ALT  5<L>  /  AlkPhos  97  12-30      ESR: 77  CRP: --  12-30 @ 05:30    Vital Signs Last 24 Hrs  T(C): 36.8 (30 Dec 2023 12:15), Max: 37.1 (29 Dec 2023 21:29)  T(F): 98.3 (30 Dec 2023 12:15), Max: 98.7 (29 Dec 2023 21:29)  HR: 96 (30 Dec 2023 12:15) (89 - 112)  BP: 104/59 (30 Dec 2023 12:15) (100/61 - 116/75)  BP(mean): --  RR: 19 (30 Dec 2023 12:15) (16 - 19)  SpO2: 95% (30 Dec 2023 12:15) (95% - 98%)    Parameters below as of 30 Dec 2023 12:15  Patient On (Oxygen Delivery Method): room air        PHYSICAL EXAM  General: NAD, AA0x3    Lower Extremity Focused:  Vasc: +2 pitting edema on left leg extending to inferior aspect of knee, PT/DP 2/4 TG warm to warm   Derm: 1.5 cm * 1.3 cm necrotic lesion noted on the plantar aspect of left hallux. Atrophic hyperpigmented circumscribed skin lesions found on dorsal aspect of legs b/l (-) PTB   Neuro: Protective sensation is intact   MSK: 5/5 muscle strength in all compartments       RADIOLOGY  < from: Xray Foot AP + Lateral, Left (12.29.23 @ 17:04) >    IMPRESSION:  Soft tissue swelling.    Subtotal 3rd ray resection through distal metatarsal with smooth tapered   corticated stump margin and preserved overlying soft tissue coverage.   Hallux IP joint valgus deformity with arthritic and slightly eroded   indistinct articular margins, infectious versus inflammatory etiologies   not excluded, correlate clinically with MRI suggested to further assess   if warranted.    Small well marginated defect/erosion along proximal lateral aspect of 5th   proximal distal phalanx base.    No acute fractures or dislocations.    Tarsometatarsal alignment maintained without evidence for a Lisfranc   injury. Preserved remaining visualized foot spaces and no additional   suspected joint margin erosions.    No calcaneal spurring.    No discrete lytic or blastic lesions.    < end of copied text >

## 2023-12-30 NOTE — H&P ADULT - NSHPPHYSICALEXAM_GEN_ALL_CORE
T(C): 36.6 (12-29-23 @ 22:58), Max: 37.1 (12-29-23 @ 21:29)  HR: 90 (12-29-23 @ 22:58) (90 - 112)  BP: 103/62 (12-29-23 @ 22:58) (100/61 - 116/75)  RR: 18 (12-29-23 @ 22:58) (16 - 18)  SpO2: 97% (12-29-23 @ 22:58) (96% - 98%)    General: NAD, laying in bed, speaking in full sentences  HEENT: head NC/AT, no conjunctival injection, EOMI, MMM  Neck: supple, no JVD  Cardio: RRR, +S1/S2, no M/R/G  Resp: lungs CTAB, no cough/wheezes/rales/rhonchi  Abdo: soft, NT, ND, +bowel sounds x4, no organomegaly or palpable mass    Extremities: WWP, no edema/cyanosis/clubbing   Vasc: 2+ radial and DP pulses b/l  Neuro: A&Ox3  Psych: speech non-pressured, thoughts goal-oriented  Skin: dry, intact, no visible jaundice   MSK: no joint swelling T(C): 36.6 (12-29-23 @ 22:58), Max: 37.1 (12-29-23 @ 21:29)  HR: 90 (12-29-23 @ 22:58) (90 - 112)  BP: 103/62 (12-29-23 @ 22:58) (100/61 - 116/75)  RR: 18 (12-29-23 @ 22:58) (16 - 18)  SpO2: 97% (12-29-23 @ 22:58) (96% - 98%)    General: NAD, laying in bed, speaking in full sentences  HEENT: head NC/AT, no conjunctival injection, EOMI, MMM  Neck: supple, no JVD  Cardio: RRR, +S1/S2, no M/R/G  Resp: lungs CTAB, no cough/wheezes/rales/rhonchi  Abdo: soft, NT, ND, +bowel sounds x4, no organomegaly or palpable mass    Extremities: WWP, +LLE ext edema, erythema, 3rd digit amputation, non-purulent dark, eschar 1cm lesion under his 1st digit. RLE 1+pitting, 1cm superficial plantar surface wound  Vasc: 2+ radial and DP pulses b/l  Neuro: A&Ox3  Psych: speech non-pressured, thoughts goal-oriented  Skin: dry, intact, no visible jaundice   MSK: no joint swelling

## 2023-12-30 NOTE — H&P ADULT - PROBLEM SELECTOR PLAN 3
Known history of HIV/AIDS, diagnosed at age 19. Currently on Biktarvy and Bactrim DS. Per patient, last Tcell <200 and VLUD a few months ago. Follows at [facility]. Patient states in a month, he misses medications 5-7x.   - Obtain CD4 and VL  - C/w Biktarvy   - C/w Bactrim DS for ppx   - f/u outpatient at [facility] Known history of HIV/AIDS, diagnosed at age 19. Currently on Biktarvy and Bactrim DS. Per patient, last Tcell <200 and VLUD a few months ago. Follows at [facility]. Patient states in a month, he misses medications 5-7x.   - Obtain CD4 and VL  - C/w Biktarvy   - C/w Bactrim DS for ppx   - f/u outpatient at [facility]

## 2023-12-30 NOTE — PROGRESS NOTE ADULT - SUBJECTIVE AND OBJECTIVE BOX
INTERVAL HPI/OVERNIGHT EVENTS:  Patient was seen and examined at bedside. As per nurse and patient, no o/n events, patient resting comfortably. No complaints at this time. Patient denies: fever, chills, dizziness, weakness, HA, Changes in vision, CP, palpitations, SOB, cough, N/V/D/C, dysuria, changes in bowel movements, LE edema. ROS otherwise negative.    VITAL SIGNS:  T(F): 98.3 (12-30-23 @ 12:15)  HR: 96 (12-30-23 @ 12:15)  BP: 104/59 (12-30-23 @ 12:15)  RR: 19 (12-30-23 @ 12:15)  SpO2: 95% (12-30-23 @ 12:15)  Wt(kg): --    PHYSICAL EXAM:    Constitutional: WDWN, NAD  HEENT: PERRL, EOMI, sclera non-icteric, neck supple, trachea midline, no masses, no JVD, MMM, good dentition  Respiratory: CTA b/l, good air entry b/l, no wheezing, no rhonchi, no rales, without accessory muscle use and no intercostal retractions  Cardiovascular: RRR, normal S1S2, no M/R/G  Gastrointestinal: soft, NTND, no masses palpable, BS normal  Extremities: Warm, well perfused, pulses equal bilateral upper and lower extremities, no edema, no clubbing  Neurological: AAOx3, CN Grossly intact  Skin: bilateral 1+/2+ edema with chronic appearing skin darkening, foot is deformed, ulcer over left side of big toe, under pad of left foot. Left toe amputation site c/d/i    MEDICATIONS  (STANDING):  apixaban 5 milliGRAM(s) Oral every 12 hours  bictegravir 50 mG/emtricitabine 200 mG/tenofovir alafenamide 25 mG (BIKTARVY) 1 Tablet(s) Oral daily  gabapentin 100 milliGRAM(s) Oral daily  nicotine -   7 mG/24Hr(s) Patch 1 Patch Transdermal daily  piperacillin/tazobactam IVPB.- 3.375 Gram(s) IV Intermittent once  piperacillin/tazobactam IVPB.. 3.375 Gram(s) IV Intermittent every 8 hours  sodium chloride 0.9%. 1000 milliLiter(s) (100 mL/Hr) IV Continuous <Continuous>  trimethoprim  160 mG/sulfamethoxazole 800 mG 1 Tablet(s) Oral every 24 hours  vancomycin  IVPB 1250 milliGRAM(s) IV Intermittent every 12 hours    MEDICATIONS  (PRN):  acetaminophen     Tablet .. 650 milliGRAM(s) Oral every 6 hours PRN Temp greater or equal to 38C (100.4F), Mild Pain (1 - 3)  aluminum hydroxide/magnesium hydroxide/simethicone Suspension 30 milliLiter(s) Oral every 4 hours PRN Dyspepsia  melatonin 3 milliGRAM(s) Oral at bedtime PRN Insomnia  ondansetron Injectable 4 milliGRAM(s) IV Push every 8 hours PRN Nausea and/or Vomiting      Allergies    cefepime (Unknown)    Intolerances        LABS:                        12.8   7.49  )-----------( 214      ( 30 Dec 2023 05:30 )             38.8     12-30    137  |  104  |  13  ----------------------------<  98  4.0   |  25  |  1.06    Ca    9.0      30 Dec 2023 05:30  Phos  2.1     12-30  Mg     2.0     12-30    TPro  7.3  /  Alb  2.9<L>  /  TBili  0.6  /  DBili  x   /  AST  11  /  ALT  5<L>  /  AlkPhos  97  12-30      Urinalysis Basic - ( 30 Dec 2023 05:30 )    Color: x / Appearance: x / SG: x / pH: x  Gluc: 98 mg/dL / Ketone: x  / Bili: x / Urobili: x   Blood: x / Protein: x / Nitrite: x   Leuk Esterase: x / RBC: x / WBC x   Sq Epi: x / Non Sq Epi: x / Bacteria: x        RADIOLOGY & ADDITIONAL TESTS:  Reviewed

## 2023-12-30 NOTE — CONSULT NOTE ADULT - SUBJECTIVE AND OBJECTIVE BOX
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 Per patient, he was using a callas roller on 12/27 on his L foot previous wound and shin to remove dead skin. Patient has a known non-purulent dark, eschar 1cm lesion under his 1st digit. Over the next two days, the left leg became more swollen, red, and painful causing difficulty while ambulating, prompting the ED visit. Patient states his wounds have always been clean. Patient takes Biktarvy, Bactrim, Eliquis, and PPI all at once in the morning. He misses his medications 5-7 times in a month since he takes them all together. Patient denies fevers or chills at home.     ED Course:  Vitals: 98.4 F, , /75, RR 18(99%) on Room air  Labs: WBC 10.7, Glucose 116, Albumin 3.1, Lactate 1.2  Imaging: LLE duplex negative for DVT. L XR foot - 3rd digit resection. Hallux IP eroded, infx vs inflammatory. Small well marginated defect/erosion along proximal lateral aspect of 5th   proximal distal phalanx base.  Consults: None  Interventions: Clindamycin 600mg, Vanc 1000g (30 Dec 2023 00:26)    PAST MEDICAL & SURGICAL HISTORY:  Human immunodeficiency virus (HIV) infection      Osteomyelitis      Pulmonary embolism      DVT, lower extremity        MEDICATIONS  (STANDING):  apixaban 5 milliGRAM(s) Oral every 12 hours  gabapentin 100 milliGRAM(s) Oral daily  nicotine -   7 mG/24Hr(s) Patch 1 Patch Transdermal daily  piperacillin/tazobactam IVPB.- 3.375 Gram(s) IV Intermittent once  piperacillin/tazobactam IVPB.. 3.375 Gram(s) IV Intermittent every 8 hours  sodium chloride 0.9%. 1000 milliLiter(s) (100 mL/Hr) IV Continuous <Continuous>  sodium phosphate 30 milliMole(s)/500 mL IVPB 30 milliMole(s) IV Intermittent once  trimethoprim  160 mG/sulfamethoxazole 800 mG 1 Tablet(s) Oral every 24 hours  vancomycin  IVPB 1250 milliGRAM(s) IV Intermittent every 12 hours    MEDICATIONS  (PRN):  acetaminophen     Tablet .. 650 milliGRAM(s) Oral every 6 hours PRN Temp greater or equal to 38C (100.4F), Mild Pain (1 - 3)  aluminum hydroxide/magnesium hydroxide/simethicone Suspension 30 milliLiter(s) Oral every 4 hours PRN Dyspepsia  melatonin 3 milliGRAM(s) Oral at bedtime PRN Insomnia  ondansetron Injectable 4 milliGRAM(s) IV Push every 8 hours PRN Nausea and/or Vomiting              Home Living Status :  lives alone in a 3rd floor walkup apt            -  Prior Home Care Services :  none        Baseline Functional Level Prior to Admission :             - ADL's/ IADL's :  independent           - Ambulatory status prior to admission :   walked with no DME, currently using bilateral [place] crutches 2/2 LLE pain        FAMILY HISTORY:  No pertinent family history in first degree relatives        CBC Full  -  ( 30 Dec 2023 05:30 )  WBC Count : 7.49 K/uL  RBC Count : 4.42 M/uL  Hemoglobin : 12.8 g/dL  Hematocrit : 38.8 %  Platelet Count - Automated : 214 K/uL  Mean Cell Volume : 87.8 fl  Mean Cell Hemoglobin : 29.0 pg  Mean Cell Hemoglobin Concentration : 33.0 gm/dL  Auto Neutrophil # : 5.13 K/uL  Auto Lymphocyte # : 1.50 K/uL  Auto Monocyte # : 0.73 K/uL  Auto Eosinophil # : 0.09 K/uL  Auto Basophil # : 0.02 K/uL  Auto Neutrophil % : 68.5 %  Auto Lymphocyte % : 20.0 %  Auto Monocyte % : 9.7 %  Auto Eosinophil % : 1.2 %  Auto Basophil % : 0.3 %      12-30    137  |  104  |  13  ----------------------------<  98  4.0   |  25  |  1.06    Ca    9.0      30 Dec 2023 05:30  Phos  2.1     12-30  Mg     2.0     12-30    TPro  7.3  /  Alb  2.9<L>  /  TBili  0.6  /  DBili  x   /  AST  11  /  ALT  5<L>  /  AlkPhos  97  12-30      Urinalysis Basic - ( 30 Dec 2023 05:30 )    Color: x / Appearance: x / SG: x / pH: x  Gluc: 98 mg/dL / Ketone: x  / Bili: x / Urobili: x   Blood: x / Protein: x / Nitrite: x   Leuk Esterase: x / RBC: x / WBC x   Sq Epi: x / Non Sq Epi: x / Bacteria: x        Radiology :     < from: Xray Foot AP + Lateral, Left (12.29.23 @ 17:04) >  ACC: [id number] EXAM:  XR FOOT 2 VIEWS LT   ORDERED BY: JR TATE     PROCEDURE DATE:  12/29/2023          INTERPRETATION:  CLINICAL INDICATION: left hallux ulceration    EXAM:  Frontal oblique lateral left foot from 12/29/2023 at 1704. No similar   prior studies available for comparison.    IMPRESSION:  Soft tissue swelling.    Subtotal 3rd ray resection through distal metatarsal with smooth tapered   corticated stump margin and preserved overlying soft tissue coverage.   Hallux IP joint valgus deformity with arthritic and slightly eroded   indistinct articular margins, infectious versus inflammatory etiologies   not excluded, correlate clinically with MRI suggested to further assess   if warranted.    Small well marginated defect/erosion along proximal lateral aspect of 5th   proximal distal phalanx base.    No acute fractures or dislocations.    Tarsometatarsal alignment maintained without evidence for a Lisfranc   injury. Preserved remaining visualized foot spaces and no additional   suspected joint margin erosions.    No calcaneal spurring.    No discrete lytic or blastic lesions.          Review of Systems : per HPI         Vital Signs Last 24 Hrs  T(C): 36.9 (30 Dec 2023 06:14), Max: 37.1 (29 Dec 2023 21:29)  T(F): 98.4 (30 Dec 2023 06:14), Max: 98.7 (29 Dec 2023 21:29)  HR: 89 (30 Dec 2023 06:14) (89 - 112)  BP: 105/69 (30 Dec 2023 06:14) (100/61 - 116/75)  BP(mean): --  RR: 18 (30 Dec 2023 06:14) (16 - 18)  SpO2: 97% (30 Dec 2023 06:14) (96% - 98%)    Parameters below as of 30 Dec 2023 06:14  Patient On (Oxygen Delivery Method): room air            Physical Exam :  53 y o man  lying comfortably in semi Olivares's position , awake , alert , no acute complaints      Head : normocephalic , atraumatic    Eyes : PERRLA , EOMI , no nystagmus , sclera anicteric    ENT / FACE : neg nasal discharge , uvula midline , no oropharyngeal erythema / exudate    Neck : supple , negative JVD , negative carotid bruits , no thyromegaly    Chest : CTA bilaterally , neg wheeze / rhonchi / rales / crackles / egophany    Cardiovascular : regular rate and rhythm , neg murmurs / rubs / gallops    Abdomen : soft , non distended , non tender to palpation in all 4 quadrants ,  normal bowel sounds     Extremities :  L foot erythema/edema, III toe amputation     Neurologic Exam :     Alert and oriented x 3        Motor Exam:                Right UE:                     no focal weakness ,  > 3+/5 throughout      Left UE:                       no focal weakness ,  > 3+/5 throughout          Right LE:          no focal weakness ,  > 3+/5 throughout          Left LE:          no focal weakness ,  > 3+/5 throughout           Sensation :         intact to light touch x 4 extremities                                DTR :           biceps/brachioradialis : equal                            patella/ankle : equal      Gait :  not tested          PM&R Impression : admitted for c/o LLE pain /swelling/redness 2/2 cellulitis      - functionally independent       Recommendations / Plan :       1) Physical / Occupational therapy focusing on therapeutic exercises , equipment evaluation , bed mobility/transfer out of bed evaluation , progressive ambulation with assistive devices prn .    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 Per patient, he was using a callas roller on 12/27 on his L foot previous wound and shin to remove dead skin. Patient has a known non-purulent dark, eschar 1cm lesion under his 1st digit. Over the next two days, the left leg became more swollen, red, and painful causing difficulty while ambulating, prompting the ED visit. Patient states his wounds have always been clean. Patient takes Biktarvy, Bactrim, Eliquis, and PPI all at once in the morning. He misses his medications 5-7 times in a month since he takes them all together. Patient denies fevers or chills at home.     ED Course:  Vitals: 98.4 F, , /75, RR 18(99%) on Room air  Labs: WBC 10.7, Glucose 116, Albumin 3.1, Lactate 1.2  Imaging: LLE duplex negative for DVT. L XR foot - 3rd digit resection. Hallux IP eroded, infx vs inflammatory. Small well marginated defect/erosion along proximal lateral aspect of 5th   proximal distal phalanx base.  Consults: None  Interventions: Clindamycin 600mg, Vanc 1000g (30 Dec 2023 00:26)    PAST MEDICAL & SURGICAL HISTORY:  Human immunodeficiency virus (HIV) infection      Osteomyelitis      Pulmonary embolism      DVT, lower extremity        MEDICATIONS  (STANDING):  apixaban 5 milliGRAM(s) Oral every 12 hours  gabapentin 100 milliGRAM(s) Oral daily  nicotine -   7 mG/24Hr(s) Patch 1 Patch Transdermal daily  piperacillin/tazobactam IVPB.- 3.375 Gram(s) IV Intermittent once  piperacillin/tazobactam IVPB.. 3.375 Gram(s) IV Intermittent every 8 hours  sodium chloride 0.9%. 1000 milliLiter(s) (100 mL/Hr) IV Continuous <Continuous>  sodium phosphate 30 milliMole(s)/500 mL IVPB 30 milliMole(s) IV Intermittent once  trimethoprim  160 mG/sulfamethoxazole 800 mG 1 Tablet(s) Oral every 24 hours  vancomycin  IVPB 1250 milliGRAM(s) IV Intermittent every 12 hours    MEDICATIONS  (PRN):  acetaminophen     Tablet .. 650 milliGRAM(s) Oral every 6 hours PRN Temp greater or equal to 38C (100.4F), Mild Pain (1 - 3)  aluminum hydroxide/magnesium hydroxide/simethicone Suspension 30 milliLiter(s) Oral every 4 hours PRN Dyspepsia  melatonin 3 milliGRAM(s) Oral at bedtime PRN Insomnia  ondansetron Injectable 4 milliGRAM(s) IV Push every 8 hours PRN Nausea and/or Vomiting              Home Living Status :  lives alone in a 3rd floor walkup apt            -  Prior Home Care Services :  none        Baseline Functional Level Prior to Admission :             - ADL's/ IADL's :  independent           - Ambulatory status prior to admission :   walked with no DME, currently using bilateral [place] crutches 2/2 LLE pain        FAMILY HISTORY:  No pertinent family history in first degree relatives        CBC Full  -  ( 30 Dec 2023 05:30 )  WBC Count : 7.49 K/uL  RBC Count : 4.42 M/uL  Hemoglobin : 12.8 g/dL  Hematocrit : 38.8 %  Platelet Count - Automated : 214 K/uL  Mean Cell Volume : 87.8 fl  Mean Cell Hemoglobin : 29.0 pg  Mean Cell Hemoglobin Concentration : 33.0 gm/dL  Auto Neutrophil # : 5.13 K/uL  Auto Lymphocyte # : 1.50 K/uL  Auto Monocyte # : 0.73 K/uL  Auto Eosinophil # : 0.09 K/uL  Auto Basophil # : 0.02 K/uL  Auto Neutrophil % : 68.5 %  Auto Lymphocyte % : 20.0 %  Auto Monocyte % : 9.7 %  Auto Eosinophil % : 1.2 %  Auto Basophil % : 0.3 %      12-30    137  |  104  |  13  ----------------------------<  98  4.0   |  25  |  1.06    Ca    9.0      30 Dec 2023 05:30  Phos  2.1     12-30  Mg     2.0     12-30    TPro  7.3  /  Alb  2.9<L>  /  TBili  0.6  /  DBili  x   /  AST  11  /  ALT  5<L>  /  AlkPhos  97  12-30      Urinalysis Basic - ( 30 Dec 2023 05:30 )    Color: x / Appearance: x / SG: x / pH: x  Gluc: 98 mg/dL / Ketone: x  / Bili: x / Urobili: x   Blood: x / Protein: x / Nitrite: x   Leuk Esterase: x / RBC: x / WBC x   Sq Epi: x / Non Sq Epi: x / Bacteria: x        Radiology :     < from: Xray Foot AP + Lateral, Left (12.29.23 @ 17:04) >  ACC: [id number] EXAM:  XR FOOT 2 VIEWS LT   ORDERED BY: JR TATE     PROCEDURE DATE:  12/29/2023          INTERPRETATION:  CLINICAL INDICATION: left hallux ulceration    EXAM:  Frontal oblique lateral left foot from 12/29/2023 at 1704. No similar   prior studies available for comparison.    IMPRESSION:  Soft tissue swelling.    Subtotal 3rd ray resection through distal metatarsal with smooth tapered   corticated stump margin and preserved overlying soft tissue coverage.   Hallux IP joint valgus deformity with arthritic and slightly eroded   indistinct articular margins, infectious versus inflammatory etiologies   not excluded, correlate clinically with MRI suggested to further assess   if warranted.    Small well marginated defect/erosion along proximal lateral aspect of 5th   proximal distal phalanx base.    No acute fractures or dislocations.    Tarsometatarsal alignment maintained without evidence for a Lisfranc   injury. Preserved remaining visualized foot spaces and no additional   suspected joint margin erosions.    No calcaneal spurring.    No discrete lytic or blastic lesions.          Review of Systems : per HPI         Vital Signs Last 24 Hrs  T(C): 36.9 (30 Dec 2023 06:14), Max: 37.1 (29 Dec 2023 21:29)  T(F): 98.4 (30 Dec 2023 06:14), Max: 98.7 (29 Dec 2023 21:29)  HR: 89 (30 Dec 2023 06:14) (89 - 112)  BP: 105/69 (30 Dec 2023 06:14) (100/61 - 116/75)  BP(mean): --  RR: 18 (30 Dec 2023 06:14) (16 - 18)  SpO2: 97% (30 Dec 2023 06:14) (96% - 98%)    Parameters below as of 30 Dec 2023 06:14  Patient On (Oxygen Delivery Method): room air            Physical Exam :  53 y o man  lying comfortably in semi Olivares's position , awake , alert , no acute complaints      Head : normocephalic , atraumatic    Eyes : PERRLA , EOMI , no nystagmus , sclera anicteric    ENT / FACE : neg nasal discharge , uvula midline , no oropharyngeal erythema / exudate    Neck : supple , negative JVD , negative carotid bruits , no thyromegaly    Chest : CTA bilaterally , neg wheeze / rhonchi / rales / crackles / egophany    Cardiovascular : regular rate and rhythm , neg murmurs / rubs / gallops    Abdomen : soft , non distended , non tender to palpation in all 4 quadrants ,  normal bowel sounds     Extremities :  L foot erythema/edema, III toe amputation     Neurologic Exam :     Alert and oriented x 3        Motor Exam:                Right UE:                     no focal weakness ,  > 3+/5 throughout      Left UE:                       no focal weakness ,  > 3+/5 throughout          Right LE:          no focal weakness ,  > 3+/5 throughout          Left LE:          no focal weakness ,  > 3+/5 throughout           Sensation :         intact to light touch x 4 extremities                                DTR :           biceps/brachioradialis : equal                            patella/ankle : equal      Gait :  not tested          PM&R Impression : admitted for c/o LLE pain /swelling/redness 2/2 cellulitis      - functionally independent       Recommendations / Plan :       1) Physical / Occupational therapy focusing on therapeutic exercises , equipment evaluation , bed mobility/transfer out of bed evaluation , progressive ambulation with assistive devices prn .    2) Current disposition plan recommendation  :      d/c home with no PT/OT needs

## 2023-12-30 NOTE — PATIENT PROFILE ADULT - FALL HARM RISK - RISK INTERVENTIONS
Assistance OOB with selected safe patient handling equipment/Communicate Fall Risk and Risk Factors to all staff, patient, and family/Discuss with provider need for PT consult/Monitor gait and stability/Provide patient with walking aids - walker, cane, crutches/Reinforce activity limits and safety measures with patient and family/Visual Cue: Yellow wristband/Bed in lowest position, wheels locked, appropriate side rails in place/Call bell, personal items and telephone in reach/Instruct patient to call for assistance before getting out of bed or chair/Non-slip footwear when patient is out of bed/[place] to call system/Physically safe environment - no spills, clutter or unnecessary equipment/Purposeful Proactive Rounding/Room/bathroom lighting operational, light cord in reach Assistance OOB with selected safe patient handling equipment/Communicate Fall Risk and Risk Factors to all staff, patient, and family/Discuss with provider need for PT consult/Monitor gait and stability/Provide patient with walking aids - walker, cane, crutches/Reinforce activity limits and safety measures with patient and family/Visual Cue: Yellow wristband/Bed in lowest position, wheels locked, appropriate side rails in place/Call bell, personal items and telephone in reach/Instruct patient to call for assistance before getting out of bed or chair/Non-slip footwear when patient is out of bed/[place] to call system/Physically safe environment - no spills, clutter or unnecessary equipment/Purposeful Proactive Rounding/Room/bathroom lighting operational, light cord in reach

## 2023-12-30 NOTE — H&P ADULT - PROBLEM SELECTOR PLAN 6
Plan:  F: None  E: replete K<4, Mg<2  N: regular  VTE Prophylaxis: Eliquis  GI: home PPI  C: Full Code  D: ANAM

## 2023-12-30 NOTE — H&P ADULT - PROBLEM SELECTOR PLAN 1
On admission met 1/4 SIRS criteria with . Likely 2/2 RLE cellulitis. Lactate 1.2. S/p Vanc 1000mg and Clinda 600mg  - c/w Vancomycin 1000mg q12 and Zosyn 4.5g q8  - f/u BCx  - Rest as below On admission met 1/4 SIRS criteria with . Likely 2/2 RLE cellulitis. Lactate 1.2. S/p Vanc 1000mg and Clinda 600mg  - c/w Vancomycin 1250mg q12 and Zosyn 4.5g q8  - f/u BCx  - Rest as below On admission met 1/4 SIRS criteria with . Likely 2/2 RLE cellulitis. Lactate 1.2. S/p Vanc 1000mg and Clinda 600mg  - c/w Vancomycin 1250mg q12 and Zosyn 3.375g q8 (PCN intolerance - itching, not angioedema or anaphylaxis)   - f/u BCx  - Rest as below

## 2023-12-30 NOTE — PHYSICAL THERAPY INITIAL EVALUATION ADULT - ADDITIONAL COMMENTS
Pt. lives alone in a 3rd floor walkup. At baseline, ambulates independently with no DME. Pt. borrowed a lofstrand crutch from his neighbor that he has been using to walk due to the new onset of LLE pain.

## 2023-12-30 NOTE — PHYSICAL THERAPY INITIAL EVALUATION ADULT - PERTINENT HX OF CURRENT PROBLEM, REHAB EVAL
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 Per patient, he was using a callas roller on 12/27 on his L foot previous wound and shin to remove dead skin. Patient has a known non-purulent dark, eschar 1cm lesion under his 1st digit. Over the next two days, the left leg became more swollen, red, and painful causing difficulty while ambulating, prompting the ED visit. Patient states his wounds have always been clean. Patient takes Biktarvy, Bactrim, Eliquis, and PPI all at once in the morning. He misses his medications 5-7 times in a month since he takes them all together. Patient denies fevers or chills at home.

## 2023-12-30 NOTE — H&P ADULT - PROBLEM SELECTOR PLAN 2
Patient with LLE edema and erythema after using a callus  on her wound and shin for 2 days. 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 Has a chronic black eschar heel wound under 1st digit without purulence or bleeding.   - Obtain Podiatry consult in AM  - PT consult   - Obtain ESR/CRP  - Obtain MRI L foot given history of 3rd digit OM and poor wound healing of 1st digit heel ulcer c/f OM  - Abx as above Patient with LLE edema and erythema after using a callus  on her wound and shin for 2 days. 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 Has a chronic black eschar heel wound under 1st digit without purulence or bleeding.    - L XR foot - 3rd digit resection. Hallux IP eroded, infx vs inflammatory. Small well marginated defect/erosion along proximal lateral aspect of 5th   proximal distal phalanx base.    Plan:  - Obtain Podiatry consult in AM  - PT consult   - Obtain ESR/CRP  - Obtain MRI L foot given history of 3rd digit OM and poor wound healing of 1st digit plantar surface ulcer c/f OM  - Abx as above

## 2023-12-30 NOTE — H&P ADULT - PROBLEM SELECTOR PLAN 4
Hx of RLE DVT/PE, first occurred in 01/23, repeat 05/23 at [place] per patient. Was initially started on Xarelto and switched to Eliquis 1 month later per patient preference. Patient takes Eliquis 5mg qd. He misses 5-7 doses/month as he takes all his medications together. Recent [facility] ED visit 09/23 with CTPE unremarkable for PE.   - LLE Duplex negative for DVT in ED, can consider RLE duplex given swelling, however patient already on AC  - c/w Eliquis 5mg qd  - c/w PPI (home medication) Hx of RLE DVT/PE, first occurred in 01/23, repeat 05/23 at [place] per patient. Was initially started on Xarelto and switched to Eliquis 1 month later per patient preference. Patient takes Eliquis 5mg qd. He misses 5-7 doses/month as he takes all his medications together. Recent [facility] ED visit 09/23 with CTPE unremarkable for PE.   - LLE Duplex negative for DVT in ED, can consider RLE duplex given swelling, however patient already on AC  - c/w Eliquis 5mg qd  - c/w PPI (home medication) Hx of RLE DVT/PE, first occurred in 01/23, repeat 05/23 at [place] per patient. Was initially started on Xarelto and switched to Eliquis 1 month later per patient preference. Patient takes Eliquis 5mg qd. He misses 5-7 doses/month as he takes all his medications together. Recent [facility] [place] ED visit 09/23 with CTPE unremarkable for PE.   - LLE Duplex negative for DVT in ED, can consider RLE duplex given swelling, however patient already on AC  - Increase to Eliquis 5mg BID, last dose 12/29   - c/w PPI (home medication) Hx of RLE DVT/PE, first occurred in 01/23, repeat 05/23 at [place] per patient. Was initially started on Xarelto and switched to Eliquis 1 month later per patient preference. Patient takes Eliquis 5mg qd. He misses 5-7 doses/month as he takes all his medications together. Recent [facility] ED visit 09/23 with CTPE unremarkable for PE.   - LLE Duplex negative for DVT in ED, can consider RLE duplex given swelling, however patient already on AC  - Increase to Eliquis 5mg BID, last dose 12/29   - c/w PPI (home medication)

## 2023-12-30 NOTE — H&P ADULT - NSHPLABSRESULTS_GEN_ALL_CORE
LABS:                        13.1   10.78 )-----------( 255      ( 29 Dec 2023 16:37 )             38.9     12-29    133  |  99  |  17  ----------------------------<  116<H>  3.8   |  24  |  1.18    Ca    9.2      29 Dec 2023 16:37    TPro  8.4<H>  /  Alb  3.1<L>  /  TBili  0.6  /  DBili  x   /  AST  21  /  ALT  8<L>  /  AlkPhos  110  12-29        RADIOLOGY & ADDITIONAL TESTS: Reviewed.

## 2023-12-30 NOTE — CONSULT NOTE ADULT - ASSESSMENT
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 Per patient, he was using a callas roller on 12/27 on his L foot previous wound and shin to remove dead skin. Patient has a known non-purulent dark, eschar 1cm lesion under his 1st digit. Over the next two days, the left leg became more swollen, red, and painful causing difficulty while ambulating, prompting the ED visit. Patient states his wounds have always been clean. Patient takes Biktarvy, Bactrim, Eliquis, and PPI all at once in the morning. He misses his medications 5-7 times in a month since he takes them all together. Patient denies fevers or chills at home. Pt states that he underwent a third digit amputation 5 years ago due to OM. The pt states that his ulcer was present since September 2023. On Tuesday he used a callus removal to help remove the necrotic tissue present. He then noticed severe redness and swelling in the left leg. He denies any drainage or pus. However the pt states that he does notice a stabbing pain which radiates uo his leg. Pain is improved with elevation. At the time of consult VSS WBC 7.49, ESR 77 .6. On XR there is no obvious evidence of an acute FX or OM.  MRI pending.     Plan: (Incomplete)   L hallux wound was debrided using sterile 15 blase. No pus was expressed   Continue with IV abx   Area was dressed with Betadine, ABD, gauze and kerlix   Discussed the importance of routine foot care       Plan discussed with attending. Podiatry will continue to follow

## 2023-12-31 VITALS
OXYGEN SATURATION: 96 % | DIASTOLIC BLOOD PRESSURE: 53 MMHG | HEART RATE: 77 BPM | RESPIRATION RATE: 18 BRPM | SYSTOLIC BLOOD PRESSURE: 115 MMHG | TEMPERATURE: 98 F

## 2023-12-31 LAB
ALBUMIN SERPL ELPH-MCNC: 3.4 G/DL — SIGNIFICANT CHANGE UP (ref 3.3–5)
ALBUMIN SERPL ELPH-MCNC: 3.4 G/DL — SIGNIFICANT CHANGE UP (ref 3.3–5)
ALP SERPL-CCNC: 98 U/L — SIGNIFICANT CHANGE UP (ref 40–120)
ALP SERPL-CCNC: 98 U/L — SIGNIFICANT CHANGE UP (ref 40–120)
ALT FLD-CCNC: 7 U/L — LOW (ref 10–45)
ALT FLD-CCNC: 7 U/L — LOW (ref 10–45)
ANION GAP SERPL CALC-SCNC: 9 MMOL/L — SIGNIFICANT CHANGE UP (ref 5–17)
ANION GAP SERPL CALC-SCNC: 9 MMOL/L — SIGNIFICANT CHANGE UP (ref 5–17)
AST SERPL-CCNC: 11 U/L — SIGNIFICANT CHANGE UP (ref 10–40)
AST SERPL-CCNC: 11 U/L — SIGNIFICANT CHANGE UP (ref 10–40)
BASOPHILS # BLD AUTO: 0.02 K/UL — SIGNIFICANT CHANGE UP (ref 0–0.2)
BASOPHILS # BLD AUTO: 0.02 K/UL — SIGNIFICANT CHANGE UP (ref 0–0.2)
BASOPHILS NFR BLD AUTO: 0.4 % — SIGNIFICANT CHANGE UP (ref 0–2)
BASOPHILS NFR BLD AUTO: 0.4 % — SIGNIFICANT CHANGE UP (ref 0–2)
BILIRUB SERPL-MCNC: 0.6 MG/DL — SIGNIFICANT CHANGE UP (ref 0.2–1.2)
BILIRUB SERPL-MCNC: 0.6 MG/DL — SIGNIFICANT CHANGE UP (ref 0.2–1.2)
BUN SERPL-MCNC: 14 MG/DL — SIGNIFICANT CHANGE UP (ref 7–23)
BUN SERPL-MCNC: 14 MG/DL — SIGNIFICANT CHANGE UP (ref 7–23)
CALCIUM SERPL-MCNC: 9.4 MG/DL — SIGNIFICANT CHANGE UP (ref 8.4–10.5)
CALCIUM SERPL-MCNC: 9.4 MG/DL — SIGNIFICANT CHANGE UP (ref 8.4–10.5)
CHLORIDE SERPL-SCNC: 103 MMOL/L — SIGNIFICANT CHANGE UP (ref 96–108)
CHLORIDE SERPL-SCNC: 103 MMOL/L — SIGNIFICANT CHANGE UP (ref 96–108)
CO2 SERPL-SCNC: 24 MMOL/L — SIGNIFICANT CHANGE UP (ref 22–31)
CO2 SERPL-SCNC: 24 MMOL/L — SIGNIFICANT CHANGE UP (ref 22–31)
CREAT SERPL-MCNC: 1.13 MG/DL — SIGNIFICANT CHANGE UP (ref 0.5–1.3)
CREAT SERPL-MCNC: 1.13 MG/DL — SIGNIFICANT CHANGE UP (ref 0.5–1.3)
EGFR: 77 ML/MIN/1.73M2 — SIGNIFICANT CHANGE UP
EGFR: 77 ML/MIN/1.73M2 — SIGNIFICANT CHANGE UP
EOSINOPHIL # BLD AUTO: 0.12 K/UL — SIGNIFICANT CHANGE UP (ref 0–0.5)
EOSINOPHIL # BLD AUTO: 0.12 K/UL — SIGNIFICANT CHANGE UP (ref 0–0.5)
EOSINOPHIL NFR BLD AUTO: 2.3 % — SIGNIFICANT CHANGE UP (ref 0–6)
EOSINOPHIL NFR BLD AUTO: 2.3 % — SIGNIFICANT CHANGE UP (ref 0–6)
GLUCOSE SERPL-MCNC: 103 MG/DL — HIGH (ref 70–99)
GLUCOSE SERPL-MCNC: 103 MG/DL — HIGH (ref 70–99)
HCT VFR BLD CALC: 39.2 % — SIGNIFICANT CHANGE UP (ref 39–50)
HCT VFR BLD CALC: 39.2 % — SIGNIFICANT CHANGE UP (ref 39–50)
HGB BLD-MCNC: 12.8 G/DL — LOW (ref 13–17)
HGB BLD-MCNC: 12.8 G/DL — LOW (ref 13–17)
IMM GRANULOCYTES NFR BLD AUTO: 0.6 % — SIGNIFICANT CHANGE UP (ref 0–0.9)
IMM GRANULOCYTES NFR BLD AUTO: 0.6 % — SIGNIFICANT CHANGE UP (ref 0–0.9)
LYMPHOCYTES # BLD AUTO: 1.29 K/UL — SIGNIFICANT CHANGE UP (ref 1–3.3)
LYMPHOCYTES # BLD AUTO: 1.29 K/UL — SIGNIFICANT CHANGE UP (ref 1–3.3)
LYMPHOCYTES # BLD AUTO: 24.8 % — SIGNIFICANT CHANGE UP (ref 13–44)
LYMPHOCYTES # BLD AUTO: 24.8 % — SIGNIFICANT CHANGE UP (ref 13–44)
MAGNESIUM SERPL-MCNC: 2.1 MG/DL — SIGNIFICANT CHANGE UP (ref 1.6–2.6)
MAGNESIUM SERPL-MCNC: 2.1 MG/DL — SIGNIFICANT CHANGE UP (ref 1.6–2.6)
MCHC RBC-ENTMCNC: 28.6 PG — SIGNIFICANT CHANGE UP (ref 27–34)
MCHC RBC-ENTMCNC: 28.6 PG — SIGNIFICANT CHANGE UP (ref 27–34)
MCHC RBC-ENTMCNC: 32.7 GM/DL — SIGNIFICANT CHANGE UP (ref 32–36)
MCHC RBC-ENTMCNC: 32.7 GM/DL — SIGNIFICANT CHANGE UP (ref 32–36)
MCV RBC AUTO: 87.7 FL — SIGNIFICANT CHANGE UP (ref 80–100)
MCV RBC AUTO: 87.7 FL — SIGNIFICANT CHANGE UP (ref 80–100)
MONOCYTES # BLD AUTO: 0.37 K/UL — SIGNIFICANT CHANGE UP (ref 0–0.9)
MONOCYTES # BLD AUTO: 0.37 K/UL — SIGNIFICANT CHANGE UP (ref 0–0.9)
MONOCYTES NFR BLD AUTO: 7.1 % — SIGNIFICANT CHANGE UP (ref 2–14)
MONOCYTES NFR BLD AUTO: 7.1 % — SIGNIFICANT CHANGE UP (ref 2–14)
NEUTROPHILS # BLD AUTO: 3.37 K/UL — SIGNIFICANT CHANGE UP (ref 1.8–7.4)
NEUTROPHILS # BLD AUTO: 3.37 K/UL — SIGNIFICANT CHANGE UP (ref 1.8–7.4)
NEUTROPHILS NFR BLD AUTO: 64.8 % — SIGNIFICANT CHANGE UP (ref 43–77)
NEUTROPHILS NFR BLD AUTO: 64.8 % — SIGNIFICANT CHANGE UP (ref 43–77)
NRBC # BLD: 0 /100 WBCS — SIGNIFICANT CHANGE UP (ref 0–0)
NRBC # BLD: 0 /100 WBCS — SIGNIFICANT CHANGE UP (ref 0–0)
PHOSPHATE SERPL-MCNC: 3.3 MG/DL — SIGNIFICANT CHANGE UP (ref 2.5–4.5)
PHOSPHATE SERPL-MCNC: 3.3 MG/DL — SIGNIFICANT CHANGE UP (ref 2.5–4.5)
PLATELET # BLD AUTO: 247 K/UL — SIGNIFICANT CHANGE UP (ref 150–400)
PLATELET # BLD AUTO: 247 K/UL — SIGNIFICANT CHANGE UP (ref 150–400)
POTASSIUM SERPL-MCNC: 4 MMOL/L — SIGNIFICANT CHANGE UP (ref 3.5–5.3)
POTASSIUM SERPL-MCNC: 4 MMOL/L — SIGNIFICANT CHANGE UP (ref 3.5–5.3)
POTASSIUM SERPL-SCNC: 4 MMOL/L — SIGNIFICANT CHANGE UP (ref 3.5–5.3)
POTASSIUM SERPL-SCNC: 4 MMOL/L — SIGNIFICANT CHANGE UP (ref 3.5–5.3)
PROT SERPL-MCNC: 7.5 G/DL — SIGNIFICANT CHANGE UP (ref 6–8.3)
PROT SERPL-MCNC: 7.5 G/DL — SIGNIFICANT CHANGE UP (ref 6–8.3)
RBC # BLD: 4.47 M/UL — SIGNIFICANT CHANGE UP (ref 4.2–5.8)
RBC # BLD: 4.47 M/UL — SIGNIFICANT CHANGE UP (ref 4.2–5.8)
RBC # FLD: 14.5 % — SIGNIFICANT CHANGE UP (ref 10.3–14.5)
RBC # FLD: 14.5 % — SIGNIFICANT CHANGE UP (ref 10.3–14.5)
SODIUM SERPL-SCNC: 136 MMOL/L — SIGNIFICANT CHANGE UP (ref 135–145)
SODIUM SERPL-SCNC: 136 MMOL/L — SIGNIFICANT CHANGE UP (ref 135–145)
WBC # BLD: 5.2 K/UL — SIGNIFICANT CHANGE UP (ref 3.8–10.5)
WBC # BLD: 5.2 K/UL — SIGNIFICANT CHANGE UP (ref 3.8–10.5)
WBC # FLD AUTO: 5.2 K/UL — SIGNIFICANT CHANGE UP (ref 3.8–10.5)
WBC # FLD AUTO: 5.2 K/UL — SIGNIFICANT CHANGE UP (ref 3.8–10.5)

## 2023-12-31 PROCEDURE — 96375 TX/PRO/DX INJ NEW DRUG ADDON: CPT

## 2023-12-31 PROCEDURE — 87536 HIV-1 QUANT&REVRSE TRNSCRPJ: CPT

## 2023-12-31 PROCEDURE — 85652 RBC SED RATE AUTOMATED: CPT

## 2023-12-31 PROCEDURE — 93971 EXTREMITY STUDY: CPT

## 2023-12-31 PROCEDURE — 80053 COMPREHEN METABOLIC PANEL: CPT

## 2023-12-31 PROCEDURE — 84100 ASSAY OF PHOSPHORUS: CPT

## 2023-12-31 PROCEDURE — 73620 X-RAY EXAM OF FOOT: CPT

## 2023-12-31 PROCEDURE — 36415 COLL VENOUS BLD VENIPUNCTURE: CPT

## 2023-12-31 PROCEDURE — 86360 T CELL ABSOLUTE COUNT/RATIO: CPT

## 2023-12-31 PROCEDURE — 86140 C-REACTIVE PROTEIN: CPT

## 2023-12-31 PROCEDURE — 97161 PT EVAL LOW COMPLEX 20 MIN: CPT

## 2023-12-31 PROCEDURE — 96374 THER/PROPH/DIAG INJ IV PUSH: CPT

## 2023-12-31 PROCEDURE — 85025 COMPLETE CBC W/AUTO DIFF WBC: CPT

## 2023-12-31 PROCEDURE — 87040 BLOOD CULTURE FOR BACTERIA: CPT

## 2023-12-31 PROCEDURE — 87637 SARSCOV2&INF A&B&RSV AMP PRB: CPT

## 2023-12-31 PROCEDURE — 99285 EMERGENCY DEPT VISIT HI MDM: CPT

## 2023-12-31 PROCEDURE — 83605 ASSAY OF LACTIC ACID: CPT

## 2023-12-31 PROCEDURE — 93005 ELECTROCARDIOGRAM TRACING: CPT

## 2023-12-31 PROCEDURE — 86359 T CELLS TOTAL COUNT: CPT

## 2023-12-31 PROCEDURE — 83735 ASSAY OF MAGNESIUM: CPT

## 2023-12-31 RX ADMIN — Medication 1 PATCH: at 06:40

## 2023-12-31 RX ADMIN — PIPERACILLIN AND TAZOBACTAM 25 GRAM(S): 4; .5 INJECTION, POWDER, LYOPHILIZED, FOR SOLUTION INTRAVENOUS at 01:41

## 2023-12-31 NOTE — CHART NOTE - NSCHARTNOTEFT_GEN_A_CORE
Patient left AMA AT 9:54 , refusing to sign AMA documents. INCOMPLETE..... Paged by RN around 7:30 AM noting that patient would like to leave AMA given increased noise in the hallway. At that point, patient was evaluated where he expressed frustration with level of noise in the hallway and his inability to sleep overnight. He also noted that he was agitated by nicotine withdrawal and his patch was not helping. At the time, I offered to help move patient to a new room with less noise and also increase his nicotine patch dose or have him try nicotine gum instead. Patient expressed interest in moving to a new room. I also discussed with patient the importance of him staying in the hospital for IV antibiotics, but if he continued to desire to leave AMA, I would recommend that he wait until he can be assessed by the attending, after which an alternative PO antibiotic regimen could be prescribed.     After obtaining a new room for patient, I went to inform him but he reported he would not like to move to a new room and would instead prefer to stay in his current room, see the attending for an PO antibiotic regimen (understanding that it would be inferior to his current IV regimen), and leave AMA. After patient was evaluated, we sent a prescription for augmentin 875-125 BID to his preferred pharmacy. Patient was instructed to wait for his discharge paperwork and to sign the AMA forms. Patient got dressed, RN pulled out IV and he left AMA AT 9:54 AM, refusing to sign AMA documents.

## 2023-12-31 NOTE — DISCHARGE NOTE PROVIDER - PROVIDER TOKENS
PROVIDER:[TOKEN:[[id number]],FOLLOWUP:[1 week]] PROVIDER:[TOKEN:[[id number]],FOLLOWUP:[1 week]]

## 2023-12-31 NOTE — DISCHARGE NOTE PROVIDER - NSDCMRMEDTOKEN_GEN_ALL_CORE_FT
amoxicillin-clavulanate 875 mg-125 mg oral tablet: 1 tab(s) orally every 12 hours  Bactrim  mg-160 mg oral tablet: 1 tab(s) orally once a day   Bactrim  mg-160 mg oral tablet: 1 tab(s) orally 3 times a day  cefpodoxime 200 mg oral tablet: 1 tab(s) orally every 12 hours  cephalexin 500 mg oral capsule: 1 cap(s) orally 4 times a day  Eliquis 5 mg oral tablet: 1 tab(s) orally once a day  elvitegravir/cobicistat/emtricitabine/tenofovir alafenamide 150 mg-150 mg-200 mg-10 mg oral tablet: 1 tab(s) orally once a day  nicotine 14 mg/24 hr transdermal film, extended release: 1  transdermal once a day  pantoprazole 40 mg oral delayed release tablet: 1 tab(s) orally once a day

## 2023-12-31 NOTE — CHART NOTE - NSCHARTNOTEFT_GEN_A_CORE
Was notified by housestaff that Pt was upset people were having loud conversations outside his room and that his roommate was disruptive and now he wants antibiotics and to leave.     I spoke with overnight RN--she was not able to confirm or deny that there were loud disturbances, but he was seen by night resident for his complaints.   Dr Serrano called bedboard for room change--he refused to stay despite our offer of new room.     I spoke to Pt: necessary tests to r/o OM not done, duration of antibiotics uncertain. Risk of disease progression/death. He refused to stay but is requesting abx, name of his podiatry provider.     Notified by RN he was walking out despite DC paperwork in progress. He refused to stay for completion of paperwork. He refused to sign AMA form.     Return precautions given, abx sent.

## 2024-01-02 LAB
4/8 RATIO: 0.23 RATIO — LOW (ref 0.9–3.6)
4/8 RATIO: 0.23 RATIO — LOW (ref 0.9–3.6)
ABS CD8: 525 CELLS/UL — SIGNIFICANT CHANGE UP (ref 142–740)
ABS CD8: 525 CELLS/UL — SIGNIFICANT CHANGE UP (ref 142–740)
CD3 BLASTS SPEC-ACNC: 675 CELLS/UL — SIGNIFICANT CHANGE UP (ref 672–1870)
CD3 BLASTS SPEC-ACNC: 675 CELLS/UL — SIGNIFICANT CHANGE UP (ref 672–1870)
CD3 BLASTS SPEC-ACNC: 70 % — SIGNIFICANT CHANGE UP (ref 59–83)
CD3 BLASTS SPEC-ACNC: 70 % — SIGNIFICANT CHANGE UP (ref 59–83)
CD4 %: 12 % — LOW (ref 30–62)
CD4 %: 12 % — LOW (ref 30–62)
CD8 %: 55 % — HIGH (ref 12–36)
CD8 %: 55 % — HIGH (ref 12–36)
T-CELL CD4 SUBSET PNL BLD: 119 CELLS/UL — LOW (ref 489–1457)
T-CELL CD4 SUBSET PNL BLD: 119 CELLS/UL — LOW (ref 489–1457)

## 2024-01-03 LAB
HIV-1 VIRAL LOAD RESULT: ABNORMAL
HIV-1 VIRAL LOAD RESULT: ABNORMAL
HIV1 RNA # SERPL NAA+PROBE: ABNORMAL COPIES/ML
HIV1 RNA # SERPL NAA+PROBE: ABNORMAL COPIES/ML
HIV1 RNA SER-IMP: SIGNIFICANT CHANGE UP
HIV1 RNA SER-IMP: SIGNIFICANT CHANGE UP
HIV1 RNA SERPL NAA+PROBE-ACNC: ABNORMAL
HIV1 RNA SERPL NAA+PROBE-ACNC: ABNORMAL
HIV1 RNA SERPL NAA+PROBE-LOG#: ABNORMAL LG COP/ML
HIV1 RNA SERPL NAA+PROBE-LOG#: ABNORMAL LG COP/ML

## 2024-01-04 LAB
CULTURE RESULTS: SIGNIFICANT CHANGE UP
SPECIMEN SOURCE: SIGNIFICANT CHANGE UP

## 2024-03-21 NOTE — PATIENT PROFILE ADULT - NUMBER OF YRS
CHIEF COMPLAINT(S): No chief complaint on file.      HISTORY OF PRESENT ILLNESS:  Sreekanth Alvarado is a 81 year old  male last seen on 2/15/24 who presents today for follow up of Primary osteoarthritis of both knees. At the last visit, the patient was encouraged to start physical therapy, take Advil and tylenol, ice and do home exercise program. Patient states ***     Accompanied by ***  Location:bilateral knee  Date of Onset: over 6 months ago   Context: see above  Did this injury occur at work: No  Severity:{#:66116}/10   Timing: {ssk pain timin}  Quality: {ssk pain adjective:273374}  Associated signs and symptoms: {[facility] pain related symptoms:775385}  Aggravating factors: {k aggrevating factors:953958}  Alleviating factors: {[facility] alleviating factors:421428}  Patient feels: ***/100  Patient feels ***% improved from last visit  Occupation: Retired  He exercises 3 times a week - treadmill, biking, elliptical and weights     Review of Systems    The patient was instructed to follow up with PCP regarding all positive ROS that were not directly pertinent with the chief complaint.      Past Medical History Updated: {YES (DEF)/NO:09337}  PAST MEDICAL HISTORY:  Past Medical History:   Diagnosis Date    B-cell lymphoma (CMD) 2019    Benign prostatic hyperplasia without lower urinary tract symptoms     CAD (coronary artery disease)     s/p PTCA to LCX and PDA 4/3/1998     Cancer of the skin, basal cell     Carotid stenosis     Chronic open angle glaucoma     COVID-19 2022    Diastolic congestive heart failure (CMD)     GERD (gastroesophageal reflux disease)     History of chemotherapy     RCHOP x 6 cycles (6/3/19-9/3/19)    History of radiation therapy     retroperitoneum ISRT: 36 gY in 20 fx, 10/22/19-19    HLD (hyperlipidemia)     HTN (hypertension)     Hypertensive heart disease with chronic diastolic congestive heart failure (CMD) 2022    Lumbago     Meibomian gland dysfunction     Sensorineural  hearing loss (SNHL) of both ears     Spinal stenosis     SSS (sick sinus syndrome) (CMD)     s/p dual chamber pacemaker (Spectrum5 MRI) 3/8/17        Past Surgical History Updated: {YES (DEF)/NO:83281}  PAST SURGICAL HISTORY:  Past Surgical History:   Procedure Laterality Date    Appendectomy      Basal cell carcinoma excision  2023    Bone marrow biopsy  2019    Cataract extraction, bilateral      Hemorrhoidectomy      Left heart cath      Lymph node biopsy  2019    CT-guided retroperitoneal lymph node    Mediport insertion, single  2019    Open coronary endarterectomy      Pacemaker  2017    dual chamber    Spine surgery  1997    Tonsillectomy      Urolift transprostatic implant procedure  2019       Past Family History Updated: {YES (DEF)/NO:89539}  FAMILY HISTORY:  Family History   Problem Relation Age of Onset    Heart disease Father     Kidney disease Father     Cancer Brother     Heart disease Brother     Kidney disease Brother     Cancer Maternal Grandfather          from Black Lung Disease    COPD Maternal Grandfather     Heart disease Paternal Grandfather      Reviewed and non-contributory to patient's illness unless otherwise stated above    Past Social History Updated: {YES (DEF)/NO:49826}  SOCIAL HISTORY:  Social History     Socioeconomic History    Marital status: /Civil Union     Spouse name: Not on file    Number of children: 3    Years of education: Not on file    Highest education level: Not on file   Occupational History    Occupation: retired from [employer]   Tobacco Use    Smoking status: Never    Smokeless tobacco: Never   Vaping Use    Vaping Use: never used   Substance and Sexual Activity    Alcohol use: Not Currently    Drug use: Never    Sexual activity: Not Currently   Other Topics Concern    Not on file   Social History Narrative    Lives with wife.3 biological children and 3 step-children     Social Determinants of Health     Financial  Resource Strain: Not on file   Food Insecurity: Not on file   Transportation Needs: Not on file   Physical Activity: Not on file   Stress: Not on file   Social Connections: Not on file   Interpersonal Safety: Not on file        MEDICATIONS:  Current Outpatient Medications   Medication Sig Dispense Refill    Polyvinyl Alcohol-Povidone (REFRESH OP) Every Four Hours      potassium chloride (KLOR-CON) 10 MEQ ER tablet Take 1 tablet by mouth daily. 90 tablet 3    simvastatin (ZOCOR) 20 MG tablet Take 1 tablet by mouth nightly. 90 tablet 3    hydroCHLOROthiazide (HYDRODIURIL) 12.5 MG tablet Take 1 tablet by mouth daily. 30 tablet 11    amLODIPine (NORVASC) 10 MG tablet Take 1 tablet by mouth daily. 90 tablet 3    doxycycline hyclate (VIBRA-TABS) 100 MG tablet Take 1 tablet by mouth daily. 30 tablet 11    VITAMIN D PO       aspirin 81 MG tablet Take 81 mg by mouth daily.      docusate sodium-sennosides (SENOKOT S) 50-8.6 MG per tablet Take 1 tablet by mouth nightly.      Ascorbic Acid (VITAMIN C) 500 MG Cap Take 500 mg by mouth daily.       Multiple Vitamins-Minerals (CENTRUM SILVER) tablet 1/2 tab qd      Vitamins-Lipotropics (LIPOFLAVONOID) Tab 1/2 tab qd.       No current facility-administered medications for this visit.     ALLERGIES:     ALLERGIES:   Allergen Reactions    Brimonidine RASH    Clemizole Other (See Comments)    Lisinopril Cough and Other (See Comments)    Losartan Cough and Other (See Comments)     Cough      Timolol Other (See Comments)    Doxazosin Other (See Comments)     Dizziness      Nebivolol Hcl Other (See Comments)     Teeth burning    Penicillins CONSTIPATION     Irritates rectum    Spironolactone Other (See Comments)     Fluid retention per patient     Carvedilol RASH     Bradycardia    Furosemide RASH     Syncope  Other reaction(s): PASSES OUT, RASH    Lactose   (Food Or Med) GI UPSET    Omeprazole Other (See Comments)     Severe leg aches.   Other reaction(s): CONSTIPTATION       VITAL  SIGNS:  There were no vitals taken for this visit.  There is no height or weight on file to calculate BMI.    Roomed by: ***    EXAM:  This is a 81 year old male, awake, alert, and cooperative. He is well nourished, well developed, and in no apparent distress.  Pulmonary - No increased work of breathing.  Lymphatics - There is no evidence of generalized adenopathy.  MSK:  ***  Radiology:   ***    {Cap Int/Ext:276973} {bckrxtype:292964::\"physical\"} therapy progress notes were reviewed and analyzed.  These show:  ***     ASSESSMENT & PLAN:  Sreekanth Alvarado is a 81 year old male with ***    Plan as follows:  1. ***  2. ***  3. ***  4. ***     The patient will follow up with us in {NUMBERS 1-31:101655} {days wks mos yr:212600}    Restrictions: ***    No diagnosis found.    ***    ***    Total time was *** minutes. This includes chart review before the visit, actual time spent with patient, and time spent on documentation after the visit.      Note to patient: The 21st Century Cures Act makes medical notes like these available to patients in the interest of transparency. However, be advised this is a medical document. It is intended as peer to peer communication. It is written in medical language and may contain abbreviations or verbiage that are unfamiliar. It may appear blunt or direct. Medical documents are intended to carry relevant information, facts as evident, and the clinical opinion of the practitioner.     20

## 2024-10-13 ENCOUNTER — EMERGENCY (EMERGENCY)
Facility: HOSPITAL | Age: 54
LOS: 1 days | Discharge: LEFT BEFORE DISCHARGE | End: 2024-10-13
Attending: EMERGENCY MEDICINE | Admitting: EMERGENCY MEDICINE
Payer: MEDICARE

## 2024-10-13 VITALS
HEART RATE: 119 BPM | OXYGEN SATURATION: 98 % | TEMPERATURE: 99 F | WEIGHT: 195.11 LBS | RESPIRATION RATE: 18 BRPM | DIASTOLIC BLOOD PRESSURE: 68 MMHG | HEIGHT: 73 IN | SYSTOLIC BLOOD PRESSURE: 102 MMHG

## 2024-10-13 VITALS
SYSTOLIC BLOOD PRESSURE: 127 MMHG | HEART RATE: 76 BPM | OXYGEN SATURATION: 98 % | TEMPERATURE: 98 F | RESPIRATION RATE: 16 BRPM | DIASTOLIC BLOOD PRESSURE: 78 MMHG

## 2024-10-13 LAB
ALBUMIN SERPL ELPH-MCNC: 3.2 G/DL — LOW (ref 3.4–5)
ALP SERPL-CCNC: 144 U/L — HIGH (ref 40–120)
ALT FLD-CCNC: 13 U/L — SIGNIFICANT CHANGE UP (ref 12–42)
ANION GAP SERPL CALC-SCNC: 8 MMOL/L — LOW (ref 9–16)
APTT BLD: 37.5 SEC — HIGH (ref 24.5–35.6)
AST SERPL-CCNC: 36 U/L — SIGNIFICANT CHANGE UP (ref 15–37)
BASOPHILS # BLD AUTO: 0.02 K/UL — SIGNIFICANT CHANGE UP (ref 0–0.2)
BASOPHILS NFR BLD AUTO: 0.2 % — SIGNIFICANT CHANGE UP (ref 0–2)
BILIRUB SERPL-MCNC: 0.7 MG/DL — SIGNIFICANT CHANGE UP (ref 0.2–1.2)
BUN SERPL-MCNC: 12 MG/DL — SIGNIFICANT CHANGE UP (ref 7–23)
CALCIUM SERPL-MCNC: 9.3 MG/DL — SIGNIFICANT CHANGE UP (ref 8.5–10.5)
CHLORIDE SERPL-SCNC: 101 MMOL/L — SIGNIFICANT CHANGE UP (ref 96–108)
CO2 SERPL-SCNC: 27 MMOL/L — SIGNIFICANT CHANGE UP (ref 22–31)
CREAT SERPL-MCNC: 1.17 MG/DL — SIGNIFICANT CHANGE UP (ref 0.5–1.3)
EGFR: 73 ML/MIN/1.73M2 — SIGNIFICANT CHANGE UP
EOSINOPHIL # BLD AUTO: 0.09 K/UL — SIGNIFICANT CHANGE UP (ref 0–0.5)
EOSINOPHIL NFR BLD AUTO: 0.8 % — SIGNIFICANT CHANGE UP (ref 0–6)
GLUCOSE SERPL-MCNC: 108 MG/DL — HIGH (ref 70–99)
HCT VFR BLD CALC: 42.8 % — SIGNIFICANT CHANGE UP (ref 39–50)
HGB BLD-MCNC: 14.3 G/DL — SIGNIFICANT CHANGE UP (ref 13–17)
IMM GRANULOCYTES NFR BLD AUTO: 0.7 % — SIGNIFICANT CHANGE UP (ref 0–0.9)
INR BLD: 1.56 — HIGH (ref 0.85–1.16)
LACTATE BLDV-MCNC: 0.8 MMOL/L — SIGNIFICANT CHANGE UP (ref 0.5–2)
LACTATE BLDV-MCNC: 0.9 MMOL/L — SIGNIFICANT CHANGE UP (ref 0.5–2)
LYMPHOCYTES # BLD AUTO: 1.61 K/UL — SIGNIFICANT CHANGE UP (ref 1–3.3)
LYMPHOCYTES # BLD AUTO: 13.9 % — SIGNIFICANT CHANGE UP (ref 13–44)
MCHC RBC-ENTMCNC: 29.9 PG — SIGNIFICANT CHANGE UP (ref 27–34)
MCHC RBC-ENTMCNC: 33.4 GM/DL — SIGNIFICANT CHANGE UP (ref 32–36)
MCV RBC AUTO: 89.5 FL — SIGNIFICANT CHANGE UP (ref 80–100)
MONOCYTES # BLD AUTO: 0.71 K/UL — SIGNIFICANT CHANGE UP (ref 0–0.9)
MONOCYTES NFR BLD AUTO: 6.1 % — SIGNIFICANT CHANGE UP (ref 2–14)
NEUTROPHILS # BLD AUTO: 9.11 K/UL — HIGH (ref 1.8–7.4)
NEUTROPHILS NFR BLD AUTO: 78.3 % — HIGH (ref 43–77)
NRBC # BLD: 0 /100 WBCS — SIGNIFICANT CHANGE UP (ref 0–0)
PLATELET # BLD AUTO: 245 K/UL — SIGNIFICANT CHANGE UP (ref 150–400)
POTASSIUM SERPL-MCNC: 3.9 MMOL/L — SIGNIFICANT CHANGE UP (ref 3.5–5.3)
POTASSIUM SERPL-SCNC: 3.9 MMOL/L — SIGNIFICANT CHANGE UP (ref 3.5–5.3)
PROT SERPL-MCNC: 8.7 G/DL — HIGH (ref 6.4–8.2)
PROTHROM AB SERPL-ACNC: 18.2 SEC — HIGH (ref 9.9–13.4)
RBC # BLD: 4.78 M/UL — SIGNIFICANT CHANGE UP (ref 4.2–5.8)
RBC # FLD: 13.6 % — SIGNIFICANT CHANGE UP (ref 10.3–14.5)
SARS-COV-2 RNA SPEC QL NAA+PROBE: SIGNIFICANT CHANGE UP
SODIUM SERPL-SCNC: 136 MMOL/L — SIGNIFICANT CHANGE UP (ref 132–145)
WBC # BLD: 11.62 K/UL — HIGH (ref 3.8–10.5)
WBC # FLD AUTO: 11.62 K/UL — HIGH (ref 3.8–10.5)

## 2024-10-13 PROCEDURE — 99285 EMERGENCY DEPT VISIT HI MDM: CPT

## 2024-10-13 PROCEDURE — 73660 X-RAY EXAM OF TOE(S): CPT | Mod: 26,LT

## 2024-10-13 PROCEDURE — 93971 EXTREMITY STUDY: CPT | Mod: 26,LT

## 2024-10-13 RX ORDER — VANCOMYCIN HCL-SODIUM CHLORIDE IV SOLN 1.5 GM/250ML-0.9% 1.5-0.9/25 GM/ML-%
1750 SOLUTION INTRAVENOUS EVERY 12 HOURS
Refills: 0 | Status: ACTIVE | OUTPATIENT
Start: 2024-10-13 | End: 2024-10-14

## 2024-10-13 RX ORDER — SODIUM CHLORIDE 0.9 % (FLUSH) 0.9 %
1000 SYRINGE (ML) INJECTION ONCE
Refills: 0 | Status: COMPLETED | OUTPATIENT
Start: 2024-10-13 | End: 2024-10-13

## 2024-10-13 RX ORDER — VANCOMYCIN HCL-SODIUM CHLORIDE IV SOLN 1.5 GM/250ML-0.9% 1.5-0.9/25 GM/ML-%
1750 SOLUTION INTRAVENOUS ONCE
Refills: 0 | Status: DISCONTINUED | OUTPATIENT
Start: 2024-10-13 | End: 2024-10-13

## 2024-10-13 RX ORDER — VANCOMYCIN HCL-SODIUM CHLORIDE IV SOLN 1.5 GM/250ML-0.9% 1.5-0.9/25 GM/ML-%
SOLUTION INTRAVENOUS
Refills: 0 | Status: DISCONTINUED | OUTPATIENT
Start: 2024-10-13 | End: 2024-10-13

## 2024-10-13 RX ORDER — LIDOCAINE 50 MG/G
1 CREAM TOPICAL ONCE
Refills: 0 | Status: COMPLETED | OUTPATIENT
Start: 2024-10-13 | End: 2024-10-13

## 2024-10-13 RX ORDER — CEFEPIME 2 G/1
2000 INJECTION, POWDER, FOR SOLUTION INTRAVENOUS ONCE
Refills: 0 | Status: COMPLETED | OUTPATIENT
Start: 2024-10-13 | End: 2024-10-13

## 2024-10-13 RX ORDER — KETOROLAC TROMETHAMINE 10 MG/1
15 TABLET, FILM COATED ORAL ONCE
Refills: 0 | Status: DISCONTINUED | OUTPATIENT
Start: 2024-10-13 | End: 2024-10-13

## 2024-10-13 RX ADMIN — CEFEPIME 100 MILLIGRAM(S): 2 INJECTION, POWDER, FOR SOLUTION INTRAVENOUS at 18:23

## 2024-10-13 RX ADMIN — KETOROLAC TROMETHAMINE 15 MILLIGRAM(S): 10 TABLET, FILM COATED ORAL at 15:45

## 2024-10-13 RX ADMIN — LIDOCAINE 1 PATCH: 50 CREAM TOPICAL at 15:45

## 2024-10-13 RX ADMIN — KETOROLAC TROMETHAMINE 15 MILLIGRAM(S): 10 TABLET, FILM COATED ORAL at 18:15

## 2024-10-13 RX ADMIN — Medication 1000 MILLILITER(S): at 15:46

## 2024-10-13 RX ADMIN — VANCOMYCIN HCL-SODIUM CHLORIDE IV SOLN 1.5 GM/250ML-0.9% 250 MILLIGRAM(S): 1.5-0.9/25 SOLUTION at 15:45

## 2024-10-13 NOTE — ED ADULT NURSE REASSESSMENT NOTE - NS ED NURSE REASSESS COMMENT FT1
Received report from Ruth ASIF for continuity of care  Pt in bed, Pt AOX4, speaking in full clear sentences, breathing equal and unlabored  Pt in nad, reporting he feels much better  Plan of care ongoing

## 2024-10-13 NOTE — ED PROVIDER NOTE - PHYSICAL EXAMINATION
VITAL SIGNS: I have reviewed nursing notes and confirm.  CONSTITUTIONAL: Well-developed; well-nourished; in no acute distress.  SKIN: Erythema, swelling tenderness of the LLE from the ankle to the knee spread outside the line drawn by the patient, +streaking up the left leg, open ulcer on the left big toe.   HEAD: Normocephalic; atraumatic.  CARD: No extremity cyanosis. Tachycardia, regular.   RESP: Speaks in full, clear sentences. No resp distress.   EXT: Moves all extremities normally. 2+ DP and PT pulses on the left leg. Able to wiggle the left toes. Intact sensation of the left leg. 5/5 left plantarflexion and dorsiflexion.   NEURO: Alert, oriented. Grossly unremarkable. No focal deficits. Fluent speech.   PSYCH: Cooperative, appropriate. Mood and affect wnl.

## 2024-10-13 NOTE — ED ADULT TRIAGE NOTE - CHIEF COMPLAINT QUOTE
Walk in with C/O Lt lower extremity swelling redness x2 days. PMH cellulitis in same limb. Tachycardic wit low grade fever on arrival.

## 2024-10-13 NOTE — ED ADULT NURSE NOTE - CAS DISCH CONDITION
----- Message from Aubrey Craig 
Left message  
Stable

## 2024-10-13 NOTE — ED ADULT NURSE NOTE - OBJECTIVE STATEMENT
Pt presents to ed Pt AOX4, speaking in full clear sentences, breathing equal and unlabored  Pt with swelling and erythema to left lower leg, +CMS in all extremities  Pt reports that he has had cellulitis in the same leg  Pt reports going to different ed last night and was given vancomycin  Pt low grade febrile and borderline tachycardic  Code sepsis called and pt taken to room  22grarm initiated and labs sent with BC  Fluids initiated as ordered and all medications administered as ordered. All pertinent patient education given and patient gave verbal confirmation they understand the reason and purpose for medication/fluid administration. Plan of care ongoing

## 2024-10-13 NOTE — ED ADULT NURSE NOTE - NSFALLUNIVINTERV_ED_ALL_ED
Bed/Stretcher in lowest position, wheels locked, appropriate side rails in place/Call bell, personal items and telephone in reach/Instruct patient to call for assistance before getting out of bed/chair/stretcher/Non-slip footwear applied when patient is off stretcher/[place] to call system/Physically safe environment - no spills, clutter or unnecessary equipment/Purposeful proactive rounding/Room/bathroom lighting operational, light cord in reach

## 2024-10-13 NOTE — ED PROVIDER NOTE - OBJECTIVE STATEMENT
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 States he went to OSH yesterday, was given dose of vancomycin but left due to lack of plan. Given hx of cellulitis, patient cecilia a [place] around the area of redness about 7 hour ago, noticed the redness was spreading so he presented to the ED here for further evaluation. Patient denies trauma, cuts, bites. Currently taking Bactrim daily for PJP prophylaxis x 1 year as his T cells were low, states his T cells have improved recently and he may be taken off of it soon. Denies any missed doses of Bactrim or AC. Also has chronic neck pain, requesting lidocaine patch.

## 2024-10-13 NOTE — ED PROVIDER NOTE - CLINICAL SUMMARY MEDICAL DECISION MAKING FREE TEXT BOX
53yo M with PMHx HIV on biktarvy and Bactrim (low T cell count) here with LLE swelling, redness, low grade fever. Also found to have left big toe ulcer. Code sepsis at time of arrival. Evidence of cellulitis on exam, neurovascularly intact. Will get US to assess for DVT given hx of swelling, patient compliant with AC so less likely. xray left toe to assess for osteomyelitis. Labs, Blood cx, lactate, IVF, toradol given sepsis alert. Likely will admit.

## 2024-10-13 NOTE — ED PROVIDER NOTE - PROGRESS NOTE DETAILS
Patient doing well. Discussed his medication allergies - patient is not sure if he has ever had a reaction to cefepime (which is listed in his chart). Patient reached out to outpatient provider who informed patient that he has had PCNs before so unlikely. Patient denies hx of anaphylaxis, SOB, throat symptoms. In chart review, received ceftriaxone in 9/2023 without reaction. Will order a dose of cefepime for broader coverage. Patient tolerated cefepime without any side effects. Patient states that he feels the cefepime significantly improved his leg pain/redness. Discussed lab and imaging results with patient, answered all questions. Patient accepted for transfer to [facility], pending transport. Patient states that he wants to sign out AGAINST MEDICAL ADVICE.  Received signout from will CARDOSO pending admission.  However patient is now agitated, states he wants to go home.  Is being verbally assaultive towards staff.  Called RN "sandra***".  Patient is refusing admission at this point.  Refused to sign out AGAINST MEDICAL ADVICE.  Patient does understand the risk of signing out AGAINST MEDICAL ADVICE.    Pt is requesting to sign out AMA. Pt is alert and oriented x 3, gait is steady. Denies SI, HI, hallucinations, delusions, is not acutely pyschotic. Denies alcohol or illicit drug use. Pt is of sound decision making capacity. I made the recommendation of admission / diagnostic testing that the pt does not wish to have. Pt is pursuing the higher risk option against medical advice. Instructed pt to return to ED for any new or worsening symptoms.     Refused to sign.

## 2024-10-13 NOTE — ED PROVIDER NOTE - CARE PLAN
Principal Discharge DX:	Cellulitis  Secondary Diagnosis:	Other specified sepsis  Secondary Diagnosis:	HIV disease   1

## 2024-10-13 NOTE — ED PROVIDER NOTE - WR ORDER NAME 1
Impression: S/P Cataract Extraction by phacoemulsification with IOL placement OD - 18 Days. Encounter for surgical aftercare following surgery on a sense organ  [id number]. Excellent post op course   Condition is improving - Plan: IOP improved COntinue brimonidine BID OD Continue with CEIOL OS as scheduled
Xray Toes, Left Foot

## 2024-10-13 NOTE — ED PROVIDER NOTE - NS ED ROS FT
+low grade fever  +pain, redness, swelling LLE   Denies nausea, vomiting, diarrhea, constipation, abdominal pain, urinary symptoms, chest pain, palpitations, shortness of breath, dyspnea on exertion, syncope/near syncope, headache, weakness, numbness, focal deficits, visual changes, gait or balance changes, dizziness

## 2024-10-13 NOTE — ED ADULT NURSE NOTE - NS ED NURSE DISCH DISPOSITION
Admitted Discharged AMA (saw a physician/midlevel provider and clinician was able to provide reasons for staying for treatment & form is signed)

## 2024-10-13 NOTE — ED PROVIDER NOTE - PATIENT PORTAL LINK FT
You can access the FollowMyHealth Patient Portal offered by [facility] by registering at the following website: [email or web address]. By joining [employer]’s FollowMyHealth portal, you will also be able to view your health information using other applications (apps) compatible with our system.

## 2024-10-13 NOTE — ED PROVIDER NOTE - ATTENDING APP SHARED VISIT CONTRIBUTION OF CARE
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 He has been admitted for this previously.  On exam he has a cellulitis of the left leg.  Sepsis pathway was triggered, he was covered with antibiotics.  He was given cefepime despite cefepime being listed as an allergy as the patient thinks that he was not actually allergic.  He tolerated the cefepime well and feels like it helped.  He was ultimately admitted to [facility] for continued IV antibiotics.

## 2024-10-14 PROBLEM — M86.9 OSTEOMYELITIS, UNSPECIFIED: Chronic | Status: ACTIVE | Noted: 2023-12-29

## 2024-10-14 PROBLEM — I82.409 ACUTE EMBOLISM AND THROMBOSIS OF UNSPECIFIED DEEP VEINS OF UNSPECIFIED LOWER EXTREMITY: Chronic | Status: ACTIVE | Noted: 2023-12-29

## 2024-10-14 PROBLEM — I26.99 OTHER PULMONARY EMBOLISM WITHOUT ACUTE COR PULMONALE: Chronic | Status: ACTIVE | Noted: 2023-12-29

## 2024-10-14 RX ORDER — CEFDINIR 250 MG/5ML
1 POWDER, FOR SUSPENSION ORAL
Qty: 20 | Refills: 0
Start: 2024-10-14 | End: 2024-10-23

## 2024-10-14 RX ORDER — DOXYCYCLINE HYCLATE 100 MG
1 CAPSULE ORAL
Qty: 20 | Refills: 0
Start: 2024-10-14 | End: 2024-10-23

## 2024-10-14 RX ORDER — SACCHAROMYCES BOULARDII 50 MG
1 CAPSULE ORAL
Qty: 90 | Refills: 0
Start: 2024-10-14 | End: 2025-01-11

## 2024-10-16 ENCOUNTER — EMERGENCY (EMERGENCY)
Age: 54
LOS: 1 days | Discharge: HOME | End: 2024-10-16
Attending: EMERGENCY MEDICINE | Admitting: EMERGENCY MEDICINE
Payer: MEDICARE

## 2024-10-16 VITALS
HEART RATE: 94 BPM | WEIGHT: 184.97 LBS | HEIGHT: 73 IN | SYSTOLIC BLOOD PRESSURE: 129 MMHG | DIASTOLIC BLOOD PRESSURE: 77 MMHG | OXYGEN SATURATION: 97 % | RESPIRATION RATE: 18 BRPM

## 2024-10-16 VITALS
HEART RATE: 90 BPM | OXYGEN SATURATION: 98 % | RESPIRATION RATE: 18 BRPM | SYSTOLIC BLOOD PRESSURE: 128 MMHG | TEMPERATURE: 98 F | DIASTOLIC BLOOD PRESSURE: 83 MMHG

## 2024-10-16 DIAGNOSIS — R23.8 OTHER SKIN CHANGES: ICD-10-CM

## 2024-10-16 DIAGNOSIS — Z88.1 ALLERGY STATUS TO OTHER ANTIBIOTIC AGENTS: ICD-10-CM

## 2024-10-16 DIAGNOSIS — L03.115 CELLULITIS OF RIGHT LOWER LIMB: ICD-10-CM

## 2024-10-16 DIAGNOSIS — Z21 ASYMPTOMATIC HUMAN IMMUNODEFICIENCY VIRUS [HIV] INFECTION STATUS: ICD-10-CM

## 2024-10-16 LAB
ALBUMIN SERPL ELPH-MCNC: 3.1 G/DL — LOW (ref 3.4–5)
ALP SERPL-CCNC: 130 U/L — HIGH (ref 40–120)
ALT FLD-CCNC: 19 U/L — SIGNIFICANT CHANGE UP (ref 12–42)
ANION GAP SERPL CALC-SCNC: 10 MMOL/L — SIGNIFICANT CHANGE UP (ref 9–16)
APTT BLD: 30.2 SEC — SIGNIFICANT CHANGE UP (ref 24.5–35.6)
AST SERPL-CCNC: 19 U/L — SIGNIFICANT CHANGE UP (ref 15–37)
BASOPHILS # BLD AUTO: 0.04 K/UL — SIGNIFICANT CHANGE UP (ref 0–0.2)
BASOPHILS NFR BLD AUTO: 0.6 % — SIGNIFICANT CHANGE UP (ref 0–2)
BILIRUB SERPL-MCNC: 0.6 MG/DL — SIGNIFICANT CHANGE UP (ref 0.2–1.2)
BUN SERPL-MCNC: 6 MG/DL — LOW (ref 7–23)
CALCIUM SERPL-MCNC: 9 MG/DL — SIGNIFICANT CHANGE UP (ref 8.5–10.5)
CHLORIDE SERPL-SCNC: 104 MMOL/L — SIGNIFICANT CHANGE UP (ref 96–108)
CO2 SERPL-SCNC: 23 MMOL/L — SIGNIFICANT CHANGE UP (ref 22–31)
CREAT SERPL-MCNC: 1.22 MG/DL — SIGNIFICANT CHANGE UP (ref 0.5–1.3)
EGFR: 70 ML/MIN/1.73M2 — SIGNIFICANT CHANGE UP
EGFR: 70 ML/MIN/1.73M2 — SIGNIFICANT CHANGE UP
EOSINOPHIL # BLD AUTO: 0.07 K/UL — SIGNIFICANT CHANGE UP (ref 0–0.5)
EOSINOPHIL NFR BLD AUTO: 1 % — SIGNIFICANT CHANGE UP (ref 0–6)
GLUCOSE SERPL-MCNC: 95 MG/DL — SIGNIFICANT CHANGE UP (ref 70–99)
HCT VFR BLD CALC: 46.2 % — SIGNIFICANT CHANGE UP (ref 39–50)
HGB BLD-MCNC: 15.4 G/DL — SIGNIFICANT CHANGE UP (ref 13–17)
IMM GRANULOCYTES NFR BLD AUTO: 0.7 % — SIGNIFICANT CHANGE UP (ref 0–0.9)
INR BLD: 1.45 — HIGH (ref 0.85–1.16)
LYMPHOCYTES # BLD AUTO: 1.71 K/UL — SIGNIFICANT CHANGE UP (ref 1–3.3)
LYMPHOCYTES # BLD AUTO: 24.3 % — SIGNIFICANT CHANGE UP (ref 13–44)
MCHC RBC-ENTMCNC: 30.9 PG — SIGNIFICANT CHANGE UP (ref 27–34)
MCHC RBC-ENTMCNC: 33.3 GM/DL — SIGNIFICANT CHANGE UP (ref 32–36)
MCV RBC AUTO: 92.8 FL — SIGNIFICANT CHANGE UP (ref 80–100)
MONOCYTES # BLD AUTO: 0.49 K/UL — SIGNIFICANT CHANGE UP (ref 0–0.9)
MONOCYTES NFR BLD AUTO: 7 % — SIGNIFICANT CHANGE UP (ref 2–14)
NEUTROPHILS # BLD AUTO: 4.69 K/UL — SIGNIFICANT CHANGE UP (ref 1.8–7.4)
NEUTROPHILS NFR BLD AUTO: 66.4 % — SIGNIFICANT CHANGE UP (ref 43–77)
NRBC # BLD: 0 /100 WBCS — SIGNIFICANT CHANGE UP (ref 0–0)
NRBC BLD-RTO: 0 /100 WBCS — SIGNIFICANT CHANGE UP (ref 0–0)
PLATELET # BLD AUTO: 266 K/UL — SIGNIFICANT CHANGE UP (ref 150–400)
POTASSIUM SERPL-MCNC: 4.4 MMOL/L — SIGNIFICANT CHANGE UP (ref 3.5–5.3)
POTASSIUM SERPL-SCNC: 4.4 MMOL/L — SIGNIFICANT CHANGE UP (ref 3.5–5.3)
PROT SERPL-MCNC: 8.6 G/DL — HIGH (ref 6.4–8.2)
PROTHROM AB SERPL-ACNC: 16.8 SEC — HIGH (ref 9.9–13.4)
RBC # BLD: 4.98 M/UL — SIGNIFICANT CHANGE UP (ref 4.2–5.8)
RBC # FLD: 13.1 % — SIGNIFICANT CHANGE UP (ref 10.3–14.5)
SODIUM SERPL-SCNC: 137 MMOL/L — SIGNIFICANT CHANGE UP (ref 132–145)
WBC # BLD: 7.05 K/UL — SIGNIFICANT CHANGE UP (ref 3.8–10.5)
WBC # FLD AUTO: 7.05 K/UL — SIGNIFICANT CHANGE UP (ref 3.8–10.5)

## 2024-10-16 PROCEDURE — 99284 EMERGENCY DEPT VISIT MOD MDM: CPT

## 2024-10-16 RX ORDER — AMPICILLIN SODIUM AND SULBACTAM SODIUM 1; .5 G/1; G/1
3 INJECTION, POWDER, FOR SOLUTION INTRAMUSCULAR; INTRAVENOUS ONCE
Refills: 0 | Status: COMPLETED | OUTPATIENT
Start: 2024-10-16 | End: 2024-10-16

## 2024-10-16 RX ORDER — ACETAMINOPHEN 500 MG/5ML
650 LIQUID (ML) ORAL ONCE
Refills: 0 | Status: COMPLETED | OUTPATIENT
Start: 2024-10-16 | End: 2024-10-16

## 2024-10-16 RX ORDER — VANCOMYCIN HCL IN 5 % DEXTROSE 1.5G/250ML
1000 PLASTIC BAG, INJECTION (ML) INTRAVENOUS ONCE
Refills: 0 | Status: COMPLETED | OUTPATIENT
Start: 2024-10-16 | End: 2024-10-16

## 2024-10-17 DIAGNOSIS — Z79.899 OTHER LONG TERM (CURRENT) DRUG THERAPY: ICD-10-CM

## 2024-10-17 DIAGNOSIS — M79.89 OTHER SPECIFIED SOFT TISSUE DISORDERS: ICD-10-CM

## 2024-10-17 DIAGNOSIS — Z79.01 LONG TERM (CURRENT) USE OF ANTICOAGULANTS: ICD-10-CM

## 2024-10-17 DIAGNOSIS — Z53.29 PROCEDURE AND TREATMENT NOT CARRIED OUT BECAUSE OF PATIENT'S DECISION FOR OTHER REASONS: ICD-10-CM

## 2024-10-17 DIAGNOSIS — R00.0 TACHYCARDIA, UNSPECIFIED: ICD-10-CM

## 2024-10-17 DIAGNOSIS — Z86.711 PERSONAL HISTORY OF PULMONARY EMBOLISM: ICD-10-CM

## 2024-10-17 DIAGNOSIS — A41.89 OTHER SPECIFIED SEPSIS: ICD-10-CM

## 2024-10-17 DIAGNOSIS — Z86.718 PERSONAL HISTORY OF OTHER VENOUS THROMBOSIS AND EMBOLISM: ICD-10-CM

## 2024-10-17 DIAGNOSIS — B20 HUMAN IMMUNODEFICIENCY VIRUS [HIV] DISEASE: ICD-10-CM

## 2024-10-17 DIAGNOSIS — L03.116 CELLULITIS OF LEFT LOWER LIMB: ICD-10-CM

## 2024-11-21 ENCOUNTER — INPATIENT (INPATIENT)
Facility: HOSPITAL | Age: 54
LOS: 1 days | Discharge: LEFT BEFORE DISCHARGE | DRG: 975 | End: 2024-11-23
Attending: STUDENT IN AN ORGANIZED HEALTH CARE EDUCATION/TRAINING PROGRAM | Admitting: STUDENT IN AN ORGANIZED HEALTH CARE EDUCATION/TRAINING PROGRAM
Payer: MEDICARE

## 2024-11-21 VITALS
RESPIRATION RATE: 18 BRPM | OXYGEN SATURATION: 100 % | WEIGHT: 184.97 LBS | HEART RATE: 122 BPM | TEMPERATURE: 103 F | DIASTOLIC BLOOD PRESSURE: 85 MMHG | HEIGHT: 73 IN | SYSTOLIC BLOOD PRESSURE: 141 MMHG

## 2024-11-21 LAB
ALBUMIN SERPL ELPH-MCNC: 3.1 G/DL — LOW (ref 3.4–5)
ALP SERPL-CCNC: 149 U/L — HIGH (ref 40–120)
ALT FLD-CCNC: 19 U/L — SIGNIFICANT CHANGE UP (ref 12–42)
ANION GAP SERPL CALC-SCNC: 7 MMOL/L — LOW (ref 9–16)
APPEARANCE UR: CLEAR — SIGNIFICANT CHANGE UP
APTT BLD: 38.3 SEC — HIGH (ref 24.5–35.6)
AST SERPL-CCNC: 19 U/L — SIGNIFICANT CHANGE UP (ref 15–37)
BASOPHILS # BLD AUTO: 0.02 K/UL — SIGNIFICANT CHANGE UP (ref 0–0.2)
BASOPHILS NFR BLD AUTO: 0.1 % — SIGNIFICANT CHANGE UP (ref 0–2)
BILIRUB SERPL-MCNC: 0.7 MG/DL — SIGNIFICANT CHANGE UP (ref 0.2–1.2)
BILIRUB UR-MCNC: NEGATIVE — SIGNIFICANT CHANGE UP
BUN SERPL-MCNC: 10 MG/DL — SIGNIFICANT CHANGE UP (ref 7–23)
CALCIUM SERPL-MCNC: 8.7 MG/DL — SIGNIFICANT CHANGE UP (ref 8.5–10.5)
CHLORIDE SERPL-SCNC: 101 MMOL/L — SIGNIFICANT CHANGE UP (ref 96–108)
CO2 SERPL-SCNC: 25 MMOL/L — SIGNIFICANT CHANGE UP (ref 22–31)
COLOR SPEC: YELLOW — SIGNIFICANT CHANGE UP
CREAT SERPL-MCNC: 1.35 MG/DL — HIGH (ref 0.5–1.3)
D DIMER BLD IA.RAPID-MCNC: 276 NG/ML DDU — HIGH
DIFF PNL FLD: NEGATIVE — SIGNIFICANT CHANGE UP
EGFR: 62 ML/MIN/1.73M2 — SIGNIFICANT CHANGE UP
EOSINOPHIL # BLD AUTO: 0 K/UL — SIGNIFICANT CHANGE UP (ref 0–0.5)
EOSINOPHIL NFR BLD AUTO: 0 % — SIGNIFICANT CHANGE UP (ref 0–6)
FLUAV AG NPH QL: SIGNIFICANT CHANGE UP
FLUBV AG NPH QL: SIGNIFICANT CHANGE UP
GLUCOSE SERPL-MCNC: 114 MG/DL — HIGH (ref 70–99)
GLUCOSE UR QL: NEGATIVE MG/DL — SIGNIFICANT CHANGE UP
HCT VFR BLD CALC: 40 % — SIGNIFICANT CHANGE UP (ref 39–50)
HGB BLD-MCNC: 13.4 G/DL — SIGNIFICANT CHANGE UP (ref 13–17)
IMM GRANULOCYTES NFR BLD AUTO: 0.5 % — SIGNIFICANT CHANGE UP (ref 0–0.9)
INR BLD: 2.05 — HIGH (ref 0.85–1.16)
KETONES UR-MCNC: NEGATIVE MG/DL — SIGNIFICANT CHANGE UP
LACTATE BLDV-MCNC: 1.3 MMOL/L — SIGNIFICANT CHANGE UP (ref 0.5–2)
LEUKOCYTE ESTERASE UR-ACNC: NEGATIVE — SIGNIFICANT CHANGE UP
LYMPHOCYTES # BLD AUTO: 1.38 K/UL — SIGNIFICANT CHANGE UP (ref 1–3.3)
LYMPHOCYTES # BLD AUTO: 8.4 % — LOW (ref 13–44)
MCHC RBC-ENTMCNC: 30.1 PG — SIGNIFICANT CHANGE UP (ref 27–34)
MCHC RBC-ENTMCNC: 33.5 G/DL — SIGNIFICANT CHANGE UP (ref 32–36)
MCV RBC AUTO: 89.9 FL — SIGNIFICANT CHANGE UP (ref 80–100)
MONOCYTES # BLD AUTO: 0.7 K/UL — SIGNIFICANT CHANGE UP (ref 0–0.9)
MONOCYTES NFR BLD AUTO: 4.3 % — SIGNIFICANT CHANGE UP (ref 2–14)
NEUTROPHILS # BLD AUTO: 14.19 K/UL — HIGH (ref 1.8–7.4)
NEUTROPHILS NFR BLD AUTO: 86.7 % — HIGH (ref 43–77)
NITRITE UR-MCNC: NEGATIVE — SIGNIFICANT CHANGE UP
NRBC # BLD: 0 /100 WBCS — SIGNIFICANT CHANGE UP (ref 0–0)
NT-PROBNP SERPL-SCNC: 124 PG/ML — SIGNIFICANT CHANGE UP
PH UR: 6.5 — SIGNIFICANT CHANGE UP (ref 5–8)
PLATELET # BLD AUTO: 207 K/UL — SIGNIFICANT CHANGE UP (ref 150–400)
POTASSIUM SERPL-MCNC: 4.2 MMOL/L — SIGNIFICANT CHANGE UP (ref 3.5–5.3)
POTASSIUM SERPL-SCNC: 4.2 MMOL/L — SIGNIFICANT CHANGE UP (ref 3.5–5.3)
PROT SERPL-MCNC: 8.1 G/DL — SIGNIFICANT CHANGE UP (ref 6.4–8.2)
PROT UR-MCNC: NEGATIVE MG/DL — SIGNIFICANT CHANGE UP
PROTHROM AB SERPL-ACNC: 23.8 SEC — HIGH (ref 9.9–13.4)
RBC # BLD: 4.45 M/UL — SIGNIFICANT CHANGE UP (ref 4.2–5.8)
RBC # FLD: 13.7 % — SIGNIFICANT CHANGE UP (ref 10.3–14.5)
RSV RNA NPH QL NAA+NON-PROBE: SIGNIFICANT CHANGE UP
SARS-COV-2 RNA SPEC QL NAA+PROBE: SIGNIFICANT CHANGE UP
SODIUM SERPL-SCNC: 133 MMOL/L — SIGNIFICANT CHANGE UP (ref 132–145)
SP GR SPEC: 1.01 — SIGNIFICANT CHANGE UP (ref 1–1.03)
TROPONIN I, HIGH SENSITIVITY RESULT: 6.7 NG/L — SIGNIFICANT CHANGE UP
UROBILINOGEN FLD QL: 0.2 MG/DL — SIGNIFICANT CHANGE UP (ref 0.2–1)
WBC # BLD: 16.37 K/UL — HIGH (ref 3.8–10.5)
WBC # FLD AUTO: 16.37 K/UL — HIGH (ref 3.8–10.5)

## 2024-11-21 PROCEDURE — 71275 CT ANGIOGRAPHY CHEST: CPT | Mod: 26,MC

## 2024-11-21 PROCEDURE — 71045 X-RAY EXAM CHEST 1 VIEW: CPT | Mod: 26

## 2024-11-21 PROCEDURE — 73630 X-RAY EXAM OF FOOT: CPT | Mod: 26,LT

## 2024-11-21 PROCEDURE — 99285 EMERGENCY DEPT VISIT HI MDM: CPT

## 2024-11-21 RX ORDER — ACETAMINOPHEN 500MG 500 MG/1
975 TABLET, COATED ORAL ONCE
Refills: 0 | Status: COMPLETED | OUTPATIENT
Start: 2024-11-21 | End: 2024-11-21

## 2024-11-21 RX ORDER — SODIUM CHLORIDE 9 MG/ML
2600 INJECTION, SOLUTION INTRAMUSCULAR; INTRAVENOUS; SUBCUTANEOUS ONCE
Refills: 0 | Status: COMPLETED | OUTPATIENT
Start: 2024-11-21 | End: 2024-11-21

## 2024-11-21 RX ORDER — IBUPROFEN 200 MG
800 TABLET ORAL ONCE
Refills: 0 | Status: COMPLETED | OUTPATIENT
Start: 2024-11-21 | End: 2024-11-21

## 2024-11-21 RX ORDER — VANCOMYCIN HCL 900 MCG/MG
1500 POWDER (GRAM) MISCELLANEOUS ONCE
Refills: 0 | Status: COMPLETED | OUTPATIENT
Start: 2024-11-21 | End: 2024-11-21

## 2024-11-21 RX ORDER — PIPERACILLIN SODIUM AND TAZOBACTAM SODIUM 4; .5 G/20ML; G/20ML
3.38 INJECTION, POWDER, LYOPHILIZED, FOR SOLUTION INTRAVENOUS ONCE
Refills: 0 | Status: COMPLETED | OUTPATIENT
Start: 2024-11-21 | End: 2024-11-21

## 2024-11-21 RX ADMIN — PIPERACILLIN SODIUM AND TAZOBACTAM SODIUM 200 GRAM(S): 4; .5 INJECTION, POWDER, LYOPHILIZED, FOR SOLUTION INTRAVENOUS at 17:47

## 2024-11-21 RX ADMIN — Medication 250 MILLIGRAM(S): at 18:22

## 2024-11-21 RX ADMIN — Medication 800 MILLIGRAM(S): at 17:53

## 2024-11-21 RX ADMIN — SODIUM CHLORIDE 2600 MILLILITER(S): 9 INJECTION, SOLUTION INTRAMUSCULAR; INTRAVENOUS; SUBCUTANEOUS at 18:22

## 2024-11-21 RX ADMIN — ACETAMINOPHEN 500MG 975 MILLIGRAM(S): 500 TABLET, COATED ORAL at 17:53

## 2024-11-21 NOTE — ED PROVIDER NOTE - ATTENDING APP SHARED VISIT CONTRIBUTION OF CARE
53yo M hx of AIDS, hx of PE, presents with "lung pain" for several days.  On exam febrile, tachycardic, hemodynamically stable, with no resp distress.  Ulceration to great toe without drainage.  Suspect sepsis, source possibly osteo of great toe.  Ddx includes PE, PNA, other.  Pt started on broad spectrum IV abx; pt signed out to overnight team pending further labs, imaging, and likely admission.

## 2024-11-21 NOTE — ED ADULT NURSE NOTE - OBJECTIVE STATEMENT
Patient is a 55 y/o M c/o chest pain? Patient walked in and reports "I think I had blood clots in my lungs". Patient reports hx of PE. Patient reports he is supposed to be on Xarelto but stopped taking it on his own. patient c/o sob and body aches. Patient febrile to 102.5. Patient has redness, swelling, and possible cellulitus to left lower leg. Patient reports hx of osteomyelitis. Patient has left toe amputee. Patient placed on continuous cardiac monitor and pulse ox.

## 2024-11-21 NOTE — ED PROVIDER NOTE - CLINICAL SUMMARY MEDICAL DECISION MAKING FREE TEXT BOX
55 yo male with hx AIDs (reports CD4 <200, VL unknown), poorly compliant with antivirals, antiphospholipid syndrome, hx PE, presents c/o "lung pain" for the past few days with posterior chest pain. c/o generalized weakness over the past week, has been mostly staying in bed. +febrile and tachycardic in triage. reports he had dental implants placed through the sinuses 3 weeks ago, has been blowing out blood clots out of nose intermittently. denies IVDU. denies cough. denies abdominal pain, vomiting, diarrhea or urinary symptoms. hx osteomyelitis and treated for LLE cellulitis recently. c/o drainage from left big toe with swelling. reports he has neuropathy to feet. PCP at [facility].     arrived febrile, tachycardic, triggered sepsis criteria, sepsis workup placed. suspect source to be pneumonia vs PCP, OM left toe. broad spectrum antibiotics ordered. anticipate inpatient admission.

## 2024-11-21 NOTE — ED ADULT TRIAGE NOTE - CHIEF COMPLAINT QUOTE
Pt states "I think I have blood clots in my sinuses and my lungs." When asked for sx pt states "I have phospholipd something or other." Pt difficult to get hx from. Found to be febrile in triage, when asked if he has had a fever pt states "no it was 101 earlier." Pt educated on fever parameters. No otc med taken. Pt reports hx of PE, supposed to take xarelto but stopped on his own. Pt also reports he is depressed because he's missing teeth and swollen groin. Code sepsis d/t vs.

## 2024-11-21 NOTE — ED ADULT NURSE REASSESSMENT NOTE - NS ED NURSE REASSESS COMMENT FT1
Assumed care of patient. Patient is awake and alert and able to answer questions. Requested urine from patient and explained needed to be tested for infection. Pt verbalized understanding. requested to eat

## 2024-11-21 NOTE — ED PROVIDER NOTE - OBJECTIVE STATEMENT
55 yo male with hx AIDs (reports CD4 <200, VL unknown), poorly compliant with antivirals, antiphospholipid syndrome, hx PE, presents c/o "lung pain" for the past few days with posterior chest pain. c/o generalized weakness over the past week, has been mostly staying in bed. +febrile and tachycardic in triage. reports he had dental implants placed through the sinuses 3 weeks ago, has been blowing out blood clots out of nose intermittently. denies IVDU. denies cough. denies abdominal pain, vomiting, diarrhea or urinary symptoms. hx osteomyelitis and treated for LLE cellulitis recently. c/o drainage from left big toe with swelling. reports he has neuropathy to feet. PCP at [facility].

## 2024-11-21 NOTE — ED PROVIDER NOTE - PHYSICAL EXAMINATION
CONSTITUTIONAL: Well-appearing; well-nourished; in no apparent distress.   	HEAD: Normocephalic; atraumatic.   	EYES:  conjunctiva and sclera clear  	ENT: normal nose; no rhinorrhea; normal pharynx with no erythema or lesions. dry mucous membranes  	NECK: Supple; non-tender;   	CARDIOVASCULAR: Normal S1, S2; no murmurs, rubs, or gallops. Regular rate and rhythm.   	RESPIRATORY: Breathing easily; breath sounds clear and equal bilaterally; no wheezes, rhonchi, or rales.  	GI: Soft; non-distended; non-tender  LLE: big toe swelling with ttp with ulceration to toe. no drainage. dpi. soft compartments. +chronic venous stasis changes to LLE .   	EXT: HAYES x 4.  	SKIN: Normal for age and race; warm; dry; good turgor; no apparent lesions or rash.   	NEURO: A & O x 3; face symmetric; grossly unremarkable.   PSYCHOLOGICAL: The patient’s mood and manner are appropriate.

## 2024-11-22 VITALS
TEMPERATURE: 98 F | DIASTOLIC BLOOD PRESSURE: 89 MMHG | HEART RATE: 85 BPM | RESPIRATION RATE: 18 BRPM | SYSTOLIC BLOOD PRESSURE: 126 MMHG | OXYGEN SATURATION: 99 %

## 2024-11-22 DIAGNOSIS — L03.119 CELLULITIS OF UNSPECIFIED PART OF LIMB: ICD-10-CM

## 2024-11-22 DIAGNOSIS — B20 HUMAN IMMUNODEFICIENCY VIRUS [HIV] DISEASE: ICD-10-CM

## 2024-11-22 DIAGNOSIS — R63.8 OTHER SYMPTOMS AND SIGNS CONCERNING FOOD AND FLUID INTAKE: ICD-10-CM

## 2024-11-22 DIAGNOSIS — A41.9 SEPSIS, UNSPECIFIED ORGANISM: ICD-10-CM

## 2024-11-22 DIAGNOSIS — D68.61 ANTIPHOSPHOLIPID SYNDROME: ICD-10-CM

## 2024-11-22 LAB
4/8 RATIO: 0.2 RATIO — LOW (ref 0.9–3.6)
ABS CD8: 627 CELLS/UL — SIGNIFICANT CHANGE UP (ref 142–740)
ADD ON TEST-SPECIMEN IN LAB: SIGNIFICANT CHANGE UP
ANION GAP SERPL CALC-SCNC: 7 MMOL/L — SIGNIFICANT CHANGE UP (ref 5–17)
APPEARANCE UR: CLEAR — SIGNIFICANT CHANGE UP
APTT BLD: 31.6 SEC — SIGNIFICANT CHANGE UP (ref 24.5–35.6)
BASOPHILS # BLD AUTO: 0.02 K/UL — SIGNIFICANT CHANGE UP (ref 0–0.2)
BASOPHILS NFR BLD AUTO: 0.2 % — SIGNIFICANT CHANGE UP (ref 0–2)
BILIRUB UR-MCNC: NEGATIVE — SIGNIFICANT CHANGE UP
BLD GP AB SCN SERPL QL: NEGATIVE — SIGNIFICANT CHANGE UP
BUN SERPL-MCNC: 10 MG/DL — SIGNIFICANT CHANGE UP (ref 7–23)
CALCIUM SERPL-MCNC: 8 MG/DL — LOW (ref 8.4–10.5)
CD3 BLASTS SPEC-ACNC: 784 CELLS/UL — SIGNIFICANT CHANGE UP (ref 672–1870)
CD3 BLASTS SPEC-ACNC: 81 % — SIGNIFICANT CHANGE UP (ref 59–83)
CD4 %: 13 % — LOW (ref 30–62)
CD8 %: 65 % — HIGH (ref 12–36)
CHLORIDE SERPL-SCNC: 107 MMOL/L — SIGNIFICANT CHANGE UP (ref 96–108)
CO2 SERPL-SCNC: 22 MMOL/L — SIGNIFICANT CHANGE UP (ref 22–31)
COLOR SPEC: YELLOW — SIGNIFICANT CHANGE UP
CREAT ?TM UR-MCNC: 47 MG/DL — SIGNIFICANT CHANGE UP
CREAT SERPL-MCNC: 0.98 MG/DL — SIGNIFICANT CHANGE UP (ref 0.5–1.3)
CRP SERPL-MCNC: 119.3 MG/L — HIGH (ref 0–4)
CRYPTOC AG FLD QL: NEGATIVE — SIGNIFICANT CHANGE UP
DIFF PNL FLD: NEGATIVE — SIGNIFICANT CHANGE UP
EGFR: 90 ML/MIN/1.73M2 — SIGNIFICANT CHANGE UP
EOSINOPHIL # BLD AUTO: 0.13 K/UL — SIGNIFICANT CHANGE UP (ref 0–0.5)
EOSINOPHIL NFR BLD AUTO: 1.5 % — SIGNIFICANT CHANGE UP (ref 0–6)
ERYTHROCYTE [SEDIMENTATION RATE] IN BLOOD: 71 MM/HR — HIGH
GLUCOSE SERPL-MCNC: 133 MG/DL — HIGH (ref 70–99)
GLUCOSE UR QL: NEGATIVE MG/DL — SIGNIFICANT CHANGE UP
HCT VFR BLD CALC: 35 % — LOW (ref 39–50)
HGB BLD-MCNC: 11.5 G/DL — LOW (ref 13–17)
IMM GRANULOCYTES NFR BLD AUTO: 0.4 % — SIGNIFICANT CHANGE UP (ref 0–0.9)
INR BLD: 1.18 — HIGH (ref 0.85–1.16)
KETONES UR-MCNC: NEGATIVE MG/DL — SIGNIFICANT CHANGE UP
LEUKOCYTE ESTERASE UR-ACNC: NEGATIVE — SIGNIFICANT CHANGE UP
LYMPHOCYTES # BLD AUTO: 0.84 K/UL — LOW (ref 1–3.3)
LYMPHOCYTES # BLD AUTO: 9.4 % — LOW (ref 13–44)
MAGNESIUM SERPL-MCNC: 2 MG/DL — SIGNIFICANT CHANGE UP (ref 1.6–2.6)
MCHC RBC-ENTMCNC: 29.4 PG — SIGNIFICANT CHANGE UP (ref 27–34)
MCHC RBC-ENTMCNC: 32.9 G/DL — SIGNIFICANT CHANGE UP (ref 32–36)
MCV RBC AUTO: 89.5 FL — SIGNIFICANT CHANGE UP (ref 80–100)
MONOCYTES # BLD AUTO: 0.49 K/UL — SIGNIFICANT CHANGE UP (ref 0–0.9)
MONOCYTES NFR BLD AUTO: 5.5 % — SIGNIFICANT CHANGE UP (ref 2–14)
NEUTROPHILS # BLD AUTO: 7.43 K/UL — HIGH (ref 1.8–7.4)
NEUTROPHILS NFR BLD AUTO: 83 % — HIGH (ref 43–77)
NITRITE UR-MCNC: NEGATIVE — SIGNIFICANT CHANGE UP
NRBC # BLD: 0 /100 WBCS — SIGNIFICANT CHANGE UP (ref 0–0)
OSMOLALITY UR: 411 MOSM/KG — SIGNIFICANT CHANGE UP (ref 300–900)
PH UR: 7 — SIGNIFICANT CHANGE UP (ref 5–8)
PHOSPHATE SERPL-MCNC: 1.6 MG/DL — LOW (ref 2.5–4.5)
PLATELET # BLD AUTO: 197 K/UL — SIGNIFICANT CHANGE UP (ref 150–400)
POTASSIUM SERPL-MCNC: 3.5 MMOL/L — SIGNIFICANT CHANGE UP (ref 3.5–5.3)
POTASSIUM SERPL-SCNC: 3.5 MMOL/L — SIGNIFICANT CHANGE UP (ref 3.5–5.3)
PROCALCITONIN SERPL-MCNC: 0.44 NG/ML — HIGH (ref 0.02–0.1)
PROT UR-MCNC: NEGATIVE MG/DL — SIGNIFICANT CHANGE UP
PROTHROM AB SERPL-ACNC: 13.5 SEC — HIGH (ref 9.9–13.4)
RBC # BLD: 3.91 M/UL — LOW (ref 4.2–5.8)
RBC # FLD: 14.2 % — SIGNIFICANT CHANGE UP (ref 10.3–14.5)
RH IG SCN BLD-IMP: POSITIVE — SIGNIFICANT CHANGE UP
SODIUM SERPL-SCNC: 136 MMOL/L — SIGNIFICANT CHANGE UP (ref 135–145)
SODIUM UR-SCNC: 141 MMOL/L — SIGNIFICANT CHANGE UP
SP GR SPEC: 1.01 — SIGNIFICANT CHANGE UP (ref 1–1.03)
T-CELL CD4 SUBSET PNL BLD: 127 CELLS/UL — LOW (ref 489–1457)
UROBILINOGEN FLD QL: 1 MG/DL — SIGNIFICANT CHANGE UP (ref 0.2–1)
UUN UR-MCNC: 268 MG/DL — SIGNIFICANT CHANGE UP
WBC # BLD: 8.95 K/UL — SIGNIFICANT CHANGE UP (ref 3.8–10.5)
WBC # FLD AUTO: 8.95 K/UL — SIGNIFICANT CHANGE UP (ref 3.8–10.5)

## 2024-11-22 PROCEDURE — 84484 ASSAY OF TROPONIN QUANT: CPT

## 2024-11-22 PROCEDURE — 86360 T CELL ABSOLUTE COUNT/RATIO: CPT

## 2024-11-22 PROCEDURE — 87637 SARSCOV2&INF A&B&RSV AMP PRB: CPT

## 2024-11-22 PROCEDURE — 87040 BLOOD CULTURE FOR BACTERIA: CPT

## 2024-11-22 PROCEDURE — 81003 URINALYSIS AUTO W/O SCOPE: CPT

## 2024-11-22 PROCEDURE — 83735 ASSAY OF MAGNESIUM: CPT

## 2024-11-22 PROCEDURE — 87077 CULTURE AEROBIC IDENTIFY: CPT

## 2024-11-22 PROCEDURE — 85379 FIBRIN DEGRADATION QUANT: CPT

## 2024-11-22 PROCEDURE — 87536 HIV-1 QUANT&REVRSE TRNSCRPJ: CPT

## 2024-11-22 PROCEDURE — 84145 PROCALCITONIN (PCT): CPT

## 2024-11-22 PROCEDURE — 85652 RBC SED RATE AUTOMATED: CPT

## 2024-11-22 PROCEDURE — 82962 GLUCOSE BLOOD TEST: CPT

## 2024-11-22 PROCEDURE — 86140 C-REACTIVE PROTEIN: CPT

## 2024-11-22 PROCEDURE — 80053 COMPREHEN METABOLIC PANEL: CPT

## 2024-11-22 PROCEDURE — 73630 X-RAY EXAM OF FOOT: CPT

## 2024-11-22 PROCEDURE — 71275 CT ANGIOGRAPHY CHEST: CPT | Mod: MC

## 2024-11-22 PROCEDURE — 99285 EMERGENCY DEPT VISIT HI MDM: CPT | Mod: 25

## 2024-11-22 PROCEDURE — 96375 TX/PRO/DX INJ NEW DRUG ADDON: CPT

## 2024-11-22 PROCEDURE — 83935 ASSAY OF URINE OSMOLALITY: CPT

## 2024-11-22 PROCEDURE — 85730 THROMBOPLASTIN TIME PARTIAL: CPT

## 2024-11-22 PROCEDURE — 85610 PROTHROMBIN TIME: CPT

## 2024-11-22 PROCEDURE — 87070 CULTURE OTHR SPECIMN AEROBIC: CPT

## 2024-11-22 PROCEDURE — 84300 ASSAY OF URINE SODIUM: CPT

## 2024-11-22 PROCEDURE — 86403 PARTICLE AGGLUT ANTBDY SCRN: CPT

## 2024-11-22 PROCEDURE — 86850 RBC ANTIBODY SCREEN: CPT

## 2024-11-22 PROCEDURE — 84540 ASSAY OF URINE/UREA-N: CPT

## 2024-11-22 PROCEDURE — 83605 ASSAY OF LACTIC ACID: CPT

## 2024-11-22 PROCEDURE — 96374 THER/PROPH/DIAG INJ IV PUSH: CPT

## 2024-11-22 PROCEDURE — 80048 BASIC METABOLIC PNL TOTAL CA: CPT

## 2024-11-22 PROCEDURE — 71045 X-RAY EXAM CHEST 1 VIEW: CPT

## 2024-11-22 PROCEDURE — 86359 T CELLS TOTAL COUNT: CPT

## 2024-11-22 PROCEDURE — 99223 1ST HOSP IP/OBS HIGH 75: CPT | Mod: GC

## 2024-11-22 PROCEDURE — 82570 ASSAY OF URINE CREATININE: CPT

## 2024-11-22 PROCEDURE — 83880 ASSAY OF NATRIURETIC PEPTIDE: CPT

## 2024-11-22 PROCEDURE — 86901 BLOOD TYPING SEROLOGIC RH(D): CPT

## 2024-11-22 PROCEDURE — 84100 ASSAY OF PHOSPHORUS: CPT

## 2024-11-22 PROCEDURE — 86900 BLOOD TYPING SEROLOGIC ABO: CPT

## 2024-11-22 PROCEDURE — 85025 COMPLETE CBC W/AUTO DIFF WBC: CPT

## 2024-11-22 PROCEDURE — 36415 COLL VENOUS BLD VENIPUNCTURE: CPT

## 2024-11-22 RX ORDER — PANTOPRAZOLE SODIUM 40 MG/1
40 TABLET, DELAYED RELEASE ORAL
Refills: 0 | Status: DISCONTINUED | OUTPATIENT
Start: 2024-11-22 | End: 2024-11-23

## 2024-11-22 RX ORDER — POTASSIUM CHLORIDE 600 MG/1
40 TABLET, EXTENDED RELEASE ORAL ONCE
Refills: 0 | Status: COMPLETED | OUTPATIENT
Start: 2024-11-22 | End: 2024-11-22

## 2024-11-22 RX ORDER — POTASSIUM PHOSPHATE, MONOBASIC POTASSIUM PHOSPHATE, DIBASIC INJECTION, 236; 224 MG/ML; MG/ML
30 SOLUTION, CONCENTRATE INTRAVENOUS ONCE
Refills: 0 | Status: COMPLETED | OUTPATIENT
Start: 2024-11-22 | End: 2024-11-22

## 2024-11-22 RX ORDER — VANCOMYCIN HCL 900 MCG/MG
1250 POWDER (GRAM) MISCELLANEOUS EVERY 12 HOURS
Refills: 0 | Status: COMPLETED | OUTPATIENT
Start: 2024-11-22 | End: 2024-11-22

## 2024-11-22 RX ORDER — ACETAMINOPHEN 500MG 500 MG/1
650 TABLET, COATED ORAL EVERY 6 HOURS
Refills: 0 | Status: DISCONTINUED | OUTPATIENT
Start: 2024-11-22 | End: 2024-11-23

## 2024-11-22 RX ORDER — NICOTINE 21 MG/24H
1 PATCH, EXTENDED RELEASE TRANSDERMAL DAILY
Refills: 0 | Status: DISCONTINUED | OUTPATIENT
Start: 2024-11-22 | End: 2024-11-23

## 2024-11-22 RX ORDER — BICTEGRAVIR SODIUM, EMTRICITABINE, AND TENOFOVIR ALAFENAMIDE FUMARATE 50; 200; 25 MG/1; MG/1; MG/1
1 TABLET ORAL
Refills: 0 | DISCHARGE

## 2024-11-22 RX ORDER — BICTEGRAVIR SODIUM, EMTRICITABINE, AND TENOFOVIR ALAFENAMIDE FUMARATE 50; 200; 25 MG/1; MG/1; MG/1
1 TABLET ORAL DAILY
Refills: 0 | Status: DISCONTINUED | OUTPATIENT
Start: 2024-11-22 | End: 2024-11-23

## 2024-11-22 RX ORDER — RIVAROXABAN 10 MG/1
10 TABLET, FILM COATED ORAL
Refills: 0 | Status: DISCONTINUED | OUTPATIENT
Start: 2024-11-22 | End: 2024-11-23

## 2024-11-22 RX ORDER — RIVAROXABAN 10 MG/1
1 TABLET, FILM COATED ORAL
Refills: 0 | DISCHARGE

## 2024-11-22 RX ADMIN — Medication 1 TABLET(S): at 12:00

## 2024-11-22 RX ADMIN — POTASSIUM PHOSPHATE, MONOBASIC POTASSIUM PHOSPHATE, DIBASIC INJECTION, 83.33 MILLIMOLE(S): 236; 224 SOLUTION, CONCENTRATE INTRAVENOUS at 12:00

## 2024-11-22 RX ADMIN — RIVAROXABAN 10 MILLIGRAM(S): 10 TABLET, FILM COATED ORAL at 18:22

## 2024-11-22 RX ADMIN — NICOTINE 1 PATCH: 21 PATCH, EXTENDED RELEASE TRANSDERMAL at 08:33

## 2024-11-22 RX ADMIN — ACETAMINOPHEN 500MG 650 MILLIGRAM(S): 500 TABLET, COATED ORAL at 14:46

## 2024-11-22 RX ADMIN — BICTEGRAVIR SODIUM, EMTRICITABINE, AND TENOFOVIR ALAFENAMIDE FUMARATE 1 TABLET(S): 50; 200; 25 TABLET ORAL at 19:50

## 2024-11-22 RX ADMIN — POTASSIUM CHLORIDE 40 MILLIEQUIVALENT(S): 600 TABLET, EXTENDED RELEASE ORAL at 12:00

## 2024-11-22 RX ADMIN — ACETAMINOPHEN 500MG 650 MILLIGRAM(S): 500 TABLET, COATED ORAL at 15:46

## 2024-11-22 RX ADMIN — NICOTINE 1 PATCH: 21 PATCH, EXTENDED RELEASE TRANSDERMAL at 18:42

## 2024-11-22 RX ADMIN — PANTOPRAZOLE SODIUM 40 MILLIGRAM(S): 40 TABLET, DELAYED RELEASE ORAL at 06:49

## 2024-11-22 RX ADMIN — Medication 166.67 MILLIGRAM(S): at 06:48

## 2024-11-22 RX ADMIN — Medication 166.67 MILLIGRAM(S): at 19:08

## 2024-11-22 NOTE — H&P ADULT - PROBLEM SELECTOR PLAN 4
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 Patient is now prescribed xarelto 10mg PO daily however not compliant w/ doses taken last 3 days however did not take for ~2 weeks prior because of depressive symptoms. Patient had c/f PE given dyspnea however CTPE negative w/o elevated markers of heart strain w/ d-dimer <300. DVT r/o w/ US on 11/13/24.     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 Patient is now prescribed xarelto 10mg PO daily however not compliant w/ doses taken last 3 days however did not take for ~2 weeks prior because of depressive symptoms. Patient had c/f PE given dyspnea however CTPE negative w/o elevated markers of heart strain w/ d-dimer <300. DVT r/o w/ US on 10/13/24.     Plan:  - C/w xarelto 10mg PO daily

## 2024-11-22 NOTE — PROGRESS NOTE ADULT - PROBLEM SELECTOR PLAN 5
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 Patient is now prescribed xarelto 10mg PO daily however not compliant w/ doses taken last 3 days however did not take for ~2 weeks prior because of depressive symptoms. Patient had c/f PE given dyspnea however CTPE negative w/o elevated markers of heart strain w/ d-dimer <300. DVT r/o w/ US on 10/13/24.     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 Patient is now prescribed xarelto 10mg PO daily however not compliant w/ doses taken last 3 days however did not take for ~2 weeks prior because of depressive symptoms. Patient had c/f PE given dyspnea however CTPE negative w/o elevated markers of heart strain w/ d-dimer <300. DVT r/o w/ US on 10/13/24.   On Xarelto instead of warfarin likley d/t compliance concerns, as per records of noncompliant history. INR 2.05 on admission, improved 11/22, no signs or concern for acute bleeds/clots. HDS.     Plan:  - C/w xarelto 10mg PO daily  - Maintain active T&S

## 2024-11-22 NOTE — H&P ADULT - NSHPPHYSICALEXAM_GEN_ALL_CORE
Constitutional: NAD, comfortable in bed.  HEENT: NC/AT, PERRLA, EOMI, no conjunctival pallor or scleral icterus, MMM  Neck: Supple, no JVD  Respiratory: CTA B/L. No w/r/r.   Cardiovascular: RRR, normal S1 and S2, no m/r/g.   Gastrointestinal: +BS, soft NTND, no guarding or rebound tenderness, no palpable masses   Extremities: wwp; no cyanosis, clubbing or edema.   Vascular: Pulses equal and strong throughout.   Neurological: AAOx3, no CN deficits, strength and sensation intact throughout.   Skin: No gross skin abnormalities or rashes Constitutional: NAD, comfortable in bed.  HEENT: PERRLA, EOMI, no conjunctival pallor or scleral icterus, MMM; no dentures  Neck: Supple, no JVD  Respiratory: CTA B/L. No w/r/r.   Cardiovascular: RRR, normal S1 and S2, no m/r/g.   Gastrointestinal: +BS, soft NTND, no guarding or rebound tenderness, no palpable masses   Extremities: wwp; no cyanosis, clubbing or edema.   Vascular: Pulses equal and strong throughout.   Neurological: AAOx3, no CN deficits, strength and sensation intact throughout.   Skin: Erythema, warmth, swelling, and tenderness of anterior aspect of L leg w underlying skin discoloration and induration w/o purulence or fluctuance; b/l feet w/ chronic toe deformities and amputated 3rd toe; L hallux w/ chronic ventral ulcer w/o sign of erythema, warmth, swelling or overt purulence expressed

## 2024-11-22 NOTE — PROGRESS NOTE ADULT - ASSESSMENT
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 Will admit to [facility] for IV antibiotics.   Awake

## 2024-11-22 NOTE — PROVIDER CONTACT NOTE (OTHER) - SITUATION
Pt's PCP called the nurses station wanting to talk about the pt's vancomycin medications and levels.  Dr. Loyola   Phone #: [phone]

## 2024-11-22 NOTE — PROGRESS NOTE ADULT - PROBLEM SELECTOR PLAN 1
Patient meeting SIRS 4/4 w/ INR 2; lactate wnl. INR chronically elevated as well in the past w/ patient on DOACs for APLS which can falsely elevate INR; liver synthetic capacity otherwise stable on labs. Patient w/ LLE cellulitis of at least 9 days of onset that has not properly treated (see below); UA negative and limited RVP negative. S/p zosyn 3.375g x1, vancomycin 1500mg x1, NS 2.6L.    Plan:  - F/u BCx x2  - Trend coags   - Management as below Patient meeting SIRS 4/4 w/ INR 2; lactate wnl. INR chronically elevated as well in the past w/ patient on DOACs for APLS which can falsely elevate INR; liver synthetic capacity otherwise stable on labs. Patient w/ LLE cellulitis of at least 9 days of onset that has not properly treated (see below); UA negative and limited RVP negative. S/p zosyn 3.375g x1, vancomycin 1500mg x1, NS 2.6L.  WBC count improved to 8.95 today.     Plan:  - F/u BCx x2  - Trend coags   - Management as below

## 2024-11-22 NOTE — H&P ADULT - HISTORY OF PRESENT ILLNESS
53 yo male with hx AIDs (reports CD4 <200, VL unknown), poorly compliant with antivirals, antiphospholipid syndrome, hx PE, presents c/o "lung pain" for the past few days with posterior chest pain. c/o generalized weakness over the past week, has been mostly staying in bed. +febrile and tachycardic in triage. reports he had dental implants placed through the sinuses 3 weeks ago, has been blowing out blood clots out of nose intermittently. denies IVDU. denies cough. denies abdominal pain, vomiting, diarrhea or urinary symptoms. hx osteomyelitis and treated for LLE cellulitis recently. c/o drainage from left big toe with swelling. reports he has neuropathy to feet. PCP at [place].    ED course:  Vitals: Tmax , HR , BP , RR, SpO2   Labs:   EKG:   Imaging:   Interventions:   Consults:  53 y/o M w/ PMHx of HIV/AIDS (VL undetectable in Dec '23/CD4 182 in Jul '24)     55 yo male with hx AIDs (reports CD4 <200, VL unknown), poorly compliant with antivirals, antiphospholipid syndrome, hx PE, presents c/o "lung pain" for the past few days with posterior chest pain. c/o generalized weakness over the past week, has been mostly staying in bed. +febrile and tachycardic in triage. reports he had dental implants placed through the sinuses 3 weeks ago, has been blowing out blood clots out of nose intermittently. denies IVDU. denies cough. denies abdominal pain, vomiting, diarrhea or urinary symptoms. hx osteomyelitis and treated for LLE cellulitis recently. c/o drainage from left big toe with swelling. reports he has neuropathy to feet. PCP at [place].    ED course:  Vitals: Tmax , HR , BP , RR, SpO2   Labs:   EKG:   Imaging:   Interventions:   Consults:  55 y/o M w/ PMHx of HIV/AIDS (VL undetectable in Dec '23/CD4 182 in Jul '24)     55 yo male with hx AIDs (reports CD4 <200, VL unknown), poorly compliant with antivirals, antiphospholipid syndrome, hx PE, presents c/o "lung pain" for the past few days with posterior chest pain. c/o generalized weakness over the past week, has been mostly staying in bed. +febrile and tachycardic in triage. reports he had dental implants placed through the sinuses 3 weeks ago, has been blowing out blood clots out of nose intermittently. denies IVDU. denies cough. denies abdominal pain, vomiting, diarrhea or urinary symptoms. hx osteomyelitis and treated for LLE cellulitis recently. c/o drainage from left big toe with swelling. reports he has neuropathy to feet. PCP at [place].    ED course:  Vitals: Tmax 102.5F, HR , //85, RR 16-20, SpO2 % RA   Labs: WBC 16.37, Hgb 13.4, PLT, 207, PTT/PT/INR 38.3/23.8/2.05, D-dimer 276, Na 133, K 4.2, HCO3 25, BUN/Cr 10/1.35, ALP/AST//19/62, Tbili 0.7, Tropl I 6.7, Pro-, UA-> negative, lactate 1.3  EKG: sinus tachycardia, QTc 427; normal axis; no pathological q waves; non-specific ST-T wave changes  Imaging: CTPE-> No pulmonary embolism to the segmental branches. No report of consolidates, diffuse infiltrates and/or groundglass opacities. CXR-> pending report; no evidence of lobar consolidates, diffuse infiltrates, trachea midline, normal cardiac silhouette no pleural effusions. X Ray L foot-> Hallux soft tissue swelling with focal plantar ulceration. IP joint with eroded articular margins indeterminate for infection/septic versus inflammatory arthritic process  Interventions: acetaminophen 975mg PO x1, ibuprofen 800mg PO x1, zosyn 3.375g x1, vancomycin 1500mg x1, NS 2.6L  Consults: none 53 y/o M w/ PMHx of HIV/AIDS (VL undetectable in Dec '23/CD4 182 in Jul '24)     55 yo male with hx AIDs (reports CD4 <200, VL unknown), poorly compliant with antivirals, antiphospholipid syndrome, hx PE, presents c/o "lung pain" for the past few days with posterior chest pain. c/o generalized weakness over the past week, has been mostly staying in bed. +febrile and tachycardic in triage. reports he had dental implants placed through the sinuses 3 weeks ago, has been blowing out blood clots out of nose intermittently. denies IVDU. denies cough. denies abdominal pain, vomiting, diarrhea or urinary symptoms. hx osteomyelitis and treated for LLE cellulitis recently. c/o drainage from left big toe with swelling. reports he has neuropathy to feet. PCP at [place].    Bactrim? biktarvy? xarelto?    ED course:  Vitals: Tmax 102.5F, HR , //85, RR 16-20, SpO2 % RA   Labs: WBC 16.37, Hgb 13.4, PLT, 207, PTT/PT/INR 38.3/23.8/2.05, D-dimer 276, Na 133, K 4.2, HCO3 25, BUN/Cr 10/1.35, ALP/AST//19/62, Tbili 0.7, Tropl I 6.7, Pro-, UA-> negative, lactate 1.3  EKG: sinus tachycardia, QTc 427; normal axis; no pathological q waves; non-specific ST-T wave changes  Imaging: CTPE-> No pulmonary embolism to the segmental branches. No report of consolidates, diffuse infiltrates and/or groundglass opacities. CXR-> pending report; no evidence of lobar consolidates, diffuse infiltrates, trachea midline, normal cardiac silhouette no pleural effusions. X Ray L foot-> Hallux soft tissue swelling with focal plantar ulceration. 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 Patient reports that he was having his routine follow-up w/ his PCP when he started feeling SOB. Per chart review at PCP's office patient had clear lungs, satting ~95% on RA, and a oral temp of 99.1F but patient was recommended to present to [facility] ED as patient was concerned for possible PE given recent dental work and poor compliance to xarelto. At bedside patient reports feelings of dyspnea have resolved and does not report recent fevers/chills, VILCHIS, sore throat, cough, congestion, myalgias/arthralgias, N/V, diarrhea, oral/throat/chest pain when eating, or new rash. Patient reports poor compliance w/ deann xarelto (has been taking last 3 days but went ~2 weeks w/o it), biktravy (last took in Oct '24), and bactrim DS (last took in Oct '24) as he forgot 2/2 feeling depressed which started after a recent procedure at [facility] where they implanted anchors for upper dentures however patient reports dentures do not fit and has been unable to get in contact w/ the surgeons. Patient does not report interest in harming himself or other; has no prescribed antidepressives and neither does he follow w/ a psychiatrist.      Patient had 2 previous visits to [facility] ED on the 11/13-11/16/24 where he presented for LLE significant for cellulitis of L anterior leg. In both occasions patient was recommended for admission but AMA'd in both instances on PO antibiotics which he reports he did not fill; he isn't clear on onset. At bedside patient reports mild tenderness on L leg but does nor report any signs of purulence at home and cannot recall any new wounds. L ventral hallux ulcer w/o any signs of purulence w/o c/o pain however patient reports both feet are numb 2/2 neuropathy. Does not report previous prolonged IV abx for bacteremia, history of IVDU, or knowledge of any previous wound cultures.     ED course:  Vitals: Tmax 102.5F, HR , //85, RR 16-20, SpO2 % RA   Labs: WBC 16.37, Hgb 13.4, PLT, 207, PTT/PT/INR 38.3/23.8/2.05, D-dimer 276, Na 133, K 4.2, HCO3 25, BUN/Cr 10/1.35, ALP/AST//19/62, Tbili 0.7, Tropl I 6.7, Pro-, UA-> negative, lactate 1.3, limited RVP-> negative  EKG: sinus tachycardia, QTc 427; normal axis; no pathological q waves; non-specific ST-T wave changes  Imaging: CTPE-> No pulmonary embolism to the segmental branches. No report of consolidates, diffuse infiltrates and/or groundglass opacities. CXR-> pending report; no evidence of lobar consolidates, diffuse infiltrates, trachea midline, normal cardiac silhouette no pleural effusions. X Ray L foot-> Hallux soft tissue swelling with focal plantar ulceration. 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 Patient reports that he was having his routine follow-up w/ his PCP when he started feeling SOB. Per chart review at PCP's office patient had clear lungs, satting ~95% on RA, and a oral temp of 99.1F but patient was recommended to present to [facility] ED as patient was concerned for possible PE given recent dental work and poor compliance to xarelto. At bedside patient reports feelings of dyspnea have resolved and does not report recent fevers/chills, VILCHIS, sore throat, cough, congestion, myalgias/arthralgias, N/V, diarrhea, oral/throat/chest pain when eating, or new rash. Patient reports poor compliance w/ deann xarelto (has been taking last 3 days but went ~2 weeks w/o it), biktravy (last took in Oct '24), and bactrim DS (last took in Oct '24) as he forgot 2/2 feeling depressed which started after a recent procedure at [facility] where they implanted anchors for upper dentures however patient reports dentures do not fit and has been unable to get in contact w/ the surgeons. Patient does not report interest in harming himself or other; has no prescribed antidepressives and neither does he follow w/ a psychiatrist.      Patient had 2 previous visits to [facility] ED on the 10/13-10/16/24 where he presented for LLE significant for cellulitis of L anterior leg. In both occasions patient was recommended for admission but AMA'd in both instances on PO antibiotics which he reports he did not fill; he isn't clear on onset. At bedside patient reports mild tenderness on L leg but does nor report any signs of purulence at home and cannot recall any new wounds. L ventral hallux ulcer w/o any signs of purulence w/o c/o pain however patient reports both feet are numb 2/2 neuropathy. Does not report previous prolonged IV abx for bacteremia, history of IVDU, or knowledge of any previous wound cultures.     ED course:  Vitals: Tmax 102.5F, HR , //85, RR 16-20, SpO2 % RA   Labs: WBC 16.37, Hgb 13.4, PLT, 207, PTT/PT/INR 38.3/23.8/2.05, D-dimer 276, Na 133, K 4.2, HCO3 25, BUN/Cr 10/1.35, ALP/AST//19/62, Tbili 0.7, Tropl I 6.7, Pro-, UA-> negative, lactate 1.3, limited RVP-> negative  EKG: sinus tachycardia, QTc 427; normal axis; no pathological q waves; non-specific ST-T wave changes  Imaging: CTPE-> No pulmonary embolism to the segmental branches. No report of consolidates, diffuse infiltrates and/or groundglass opacities. CXR-> pending report; no evidence of lobar consolidates, diffuse infiltrates, trachea midline, normal cardiac silhouette no pleural effusions. X Ray L foot-> Hallux soft tissue swelling with focal plantar ulceration. IP joint with eroded articular margins indeterminate for infection/septic versus inflammatory arthritic process  Interventions: acetaminophen 975mg PO x1, ibuprofen 800mg PO x1, zosyn 3.375g x1, vancomycin 1500mg x1, NS 2.6L  Consults: none

## 2024-11-22 NOTE — PROGRESS NOTE ADULT - SUBJECTIVE AND OBJECTIVE BOX
CC: Patient is a 54y old  Male who presents with a chief complaint of Severe sepsis 2/2 LLE cellulitis (2024 02:12)      INTERVAL EVENTS: MAHESH    SUBJECTIVE / INTERVAL HPI: Patient seen and examined at bedside.     ROS: negative unless otherwise stated above.    VITAL SIGNS:  Vitals Interpretation review:  Vital Signs Last 24 Hrs  T(C): 36.4 (2024 06:02), Max: 39.2 (2024 17:02)  T(F): 97.6 (2024 06:02), Max: 102.5 (2024 17:02)  HR: 72 (2024 06:02) (72 - 122)  BP: 108/66 (2024 06:02) (100/63 - 141/85)  BP(mean): 83 (2024 02:07) (83 - 87)  RR: 17 (2024 06:02) (16 - 20)  SpO2: 99% (2024 06:02) (96% - 100%)    Parameters below as of 2024 06:02  Patient On (Oxygen Delivery Method): room air            PHYSICAL EXAM:  General: NAD  HEENT: MMM, PERRL, EOMI, anicteric  Neck: supple  Cardiovascular: +S1/S2; RRR, warm & dry  Respiratory: CTA B/L; no W/R/R  Gastrointestinal: soft, NT/ND, bsx4, no jaundice  Extremities: WWP; no edema, clubbing or cyanosis, +ROM  Vascular: 2+ radial, DP/PT pulses B/L  Neurological: AAOx3; no focal deficits    MEDICATIONS:  MEDICATIONS  (STANDING):  nicotine -  14 mG/24Hr(s) Patch 1 Patch Transdermal daily  pantoprazole    Tablet 40 milliGRAM(s) Oral before breakfast  rivaroxaban 10 milliGRAM(s) Oral with dinner  trimethoprim  160 mG/sulfamethoxazole 800 mG 1 Tablet(s) Oral daily  vancomycin  IVPB 1250 milliGRAM(s) IV Intermittent every 12 hours    MEDICATIONS  (PRN):  acetaminophen     Tablet .. 650 milliGRAM(s) Oral every 6 hours PRN Temp greater or equal to 38C (100.4F), Mild Pain (1 - 3)      ALLERGIES:  Allergies    cefepime (Unknown)    Intolerances        LABS:  Labs Interpretation:                         13.4   16.37 )-----------( 207      ( 2024 17:32 )             40.0         133  |  101  |  10  ----------------------------<  114[H]  4.2   |  25  |  1.35[H]    Ca    8.7      2024 17:32    TPro  8.1  /  Alb  3.1[L]  /  TBili  0.7  /  DBili  x   /  AST  19  /  ALT  19  /  AlkPhos  149[H]  11    PT/INR - ( 2024 17:32 )   PT: 23.8 sec;   INR: 2.05          PTT - ( 2024 17:32 )  PTT:38.3 sec  Urinalysis Basic - ( 2024 17:32 )    Color: Yellow / Appearance: Clear / S.014 / pH: x  Gluc: 114 mg/dL / Ketone: Negative mg/dL  / Bili: Negative / Urobili: 0.2 mg/dL   Blood: x / Protein: Negative mg/dL / Nitrite: Negative   Leuk Esterase: Negative / RBC: x / WBC x   Sq Epi: x / Non Sq Epi: x / Bacteria: x      CAPILLARY BLOOD GLUCOSE      POCT Blood Glucose.: 103 mg/dL (2024 17:17)        RADIOLOGY & ADDITIONAL TESTS: Reviewed.    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 Patient reports that he was having his routine follow-up w/ his PCP when he started feeling SOB. Per chart review at PCP's office patient had clear lungs, satting ~95% on RA, and a oral temp of 99.1F but patient was recommended to present to [facility] ED as patient was concerned for possible PE given recent dental work and poor compliance to xarelto. At bedside patient reports feelings of dyspnea have resolved and does not report recent fevers/chills, VILCHIS, sore throat, cough, congestion, myalgias/arthralgias, N/V, diarrhea, oral/throat/chest pain when eating, or new rash. Patient reports poor compliance w/ deann xarelto (has been taking last 3 days but went ~2 weeks w/o it), biktravy (last took in Oct '24), and bactrim DS (last took in Oct '24) as he forgot 2/2 feeling depressed which started after a recent procedure at [facility] where they implanted anchors for upper dentures however patient reports dentures do not fit and has been unable to get in contact w/ the surgeons. Patient does not report interest in harming himself or other; has no prescribed antidepressives and neither does he follow w/ a psychiatrist.      Patient had 2 previous visits to [facility] ED on the 10/13-10/16/24 where he presented for LLE significant for cellulitis of L anterior leg. In both occasions patient was recommended for admission but AMA'd in both instances on PO antibiotics which he reports he did not fill; he isn't clear on onset. At bedside patient reports mild tenderness on L leg but does nor report any signs of purulence at home and cannot recall any new wounds. L ventral hallux ulcer w/o any signs of purulence w/o c/o pain however patient reports both feet are numb 2/2 neuropathy. Does not report previous prolonged IV abx for bacteremia, history of IVDU, or knowledge of any previous wound cultures.     ED course:  Vitals: Tmax 102.5F, HR , //85, RR 16-20, SpO2 % RA   Labs: WBC 16.37, Hgb 13.4, PLT, 207, PTT/PT/INR 38.3/23.8/2.05, D-dimer 276, Na 133, K 4.2, HCO3 25, BUN/Cr 10/1.35, ALP/AST//19/62, Tbili 0.7, Tropl I 6.7, Pro-, UA-> negative, lactate 1.3, limited RVP-> negative  EKG: sinus tachycardia, QTc 427; normal axis; no pathological q waves; non-specific ST-T wave changes  Imaging: CTPE-> No pulmonary embolism to the segmental branches. No report of consolidates, diffuse infiltrates and/or groundglass opacities. CXR-> pending report; no evidence of lobar consolidates, diffuse infiltrates, trachea midline, normal cardiac silhouette no pleural effusions. X Ray L foot-> Hallux soft tissue swelling with focal plantar ulceration. IP joint with eroded articular margins indeterminate for infection/septic versus inflammatory arthritic process  Interventions: acetaminophen 975mg PO x1, ibuprofen 800mg PO x1, zosyn 3.375g x1, vancomycin 1500mg x1, NS 2.6L  Consults: none (2024 02:12)    -----------------------------------------------------------------------  *****Copy/Paste individual medications to appropriate section (Continue, Start, Stop, Change Dose)*****                              *****Only copy inpatient medications that the patient will use at home*****    -------------------------------HOME and CURRENT MEDICATIONS------------------------------------    HOME MEDICATIONS/PRESCRIPTIONS:  Bactrim  mg-160 mg oral tablet: 1 tab(s) orally once a day   Biktarvy 50 mg-200 mg-25 mg oral tablet: 1 tab(s) orally once a day  pantoprazole 40 mg oral delayed release tablet: 1 tab(s) orally once a day  Xarelto 10 mg oral tablet: 1 tab(s) orally once a day        MEDICATIONS  (STANDING):  nicotine -  14 mG/24Hr(s) Patch 1 Patch Transdermal daily  pantoprazole    Tablet 40 milliGRAM(s) Oral before breakfast  rivaroxaban 10 milliGRAM(s) Oral with dinner  trimethoprim  160 mG/sulfamethoxazole 800 mG 1 Tablet(s) Oral daily  vancomycin  IVPB 1250 milliGRAM(s) IV Intermittent every 12 hours    MEDICATIONS  (PRN):  acetaminophen     Tablet .. 650 milliGRAM(s) Oral every 6 hours PRN Temp greater or equal to 38C (100.4F), Mild Pain (1 - 3)      ----------------------------------MEDICATION RECONCILIATION----------------------------------------        STOP Home Medications:            CONTINUE Home Medications:            START (NEW Home Medications):            CHANGE DOSE of Home Medication:   CC: Patient is a 54y old  Male who presents with a chief complaint of Severe sepsis 2/2 LLE cellulitis (2024 02:12)      INTERVAL EVENTS: MAHESH    SUBJECTIVE / INTERVAL HPI: Patient seen and examined at bedside. Patient states he is overall feeling well today and reports a mild frontal headache, which is not unusual for him. He also reports residual nasal congestion since his dental procedure. He denies any shortness of breath today.  Denies cough, chest pain, fever, chills, leg pain, abdominal pain, nausea/vomiting.    ROS: negative unless otherwise stated above.    VITAL SIGNS:  Vitals Interpretation review:  Vital Signs Last 24 Hrs  T(C): 36.4 (2024 06:02), Max: 39.2 (2024 17:02)  T(F): 97.6 (2024 06:02), Max: 102.5 (2024 17:02)  HR: 72 (2024 06:02) (72 - 122)  BP: 108/66 (2024 06:02) (100/63 - 141/85)  BP(mean): 83 (2024 02:07) (83 - 87)  RR: 17 (2024 06:02) (16 - 20)  SpO2: 99% (2024 06:02) (96% - 100%)    Parameters below as of 2024 06:02  Patient On (Oxygen Delivery Method): room air            PHYSICAL EXAM:  General: NAD, comfortable in bed.   HEENT: MMM, PERRL, EOMI, anicteric  Neck: supple  Cardiovascular: +S1/S2; RRR, warm & dry  Respiratory: CTA B/L; no W/R/R  Gastrointestinal: soft, NT/ND, bsx4, no jaundice  Extremities: WWP; no edema, clubbing or cyanosis, +ROM  Vascular: 2+ radial, DP/PT pulses B/L  Neurological: AAOx3; no focal deficits  Dermatological: Erythema, warmth, swelling and mild tenderness of L anterior leg with dark skin discoloration and induration; no purulence, fluctuance, or open wound. Amputated L 3rd toe and L hallux with chronic ventral ulcer w/o signs of erythema, warmth, swelling, or overt purulence expressed. B/L feet with chronic toe deformities.     MEDICATIONS:  MEDICATIONS  (STANDING):  nicotine -  14 mG/24Hr(s) Patch 1 Patch Transdermal daily  pantoprazole    Tablet 40 milliGRAM(s) Oral before breakfast  rivaroxaban 10 milliGRAM(s) Oral with dinner  trimethoprim  160 mG/sulfamethoxazole 800 mG 1 Tablet(s) Oral daily  vancomycin  IVPB 1250 milliGRAM(s) IV Intermittent every 12 hours    MEDICATIONS  (PRN):  acetaminophen     Tablet .. 650 milliGRAM(s) Oral every 6 hours PRN Temp greater or equal to 38C (100.4F), Mild Pain (1 - 3)      ALLERGIES:  Allergies    cefepime (Unknown)    Intolerances        LABS:  Labs Interpretation:                         13.4   16.37 )-----------( 207      ( 2024 17:32 )             40.0         133  |  101  |  10  ----------------------------<  114[H]  4.2   |  25  |  1.35[H]    Ca    8.7      2024 17:32    TPro  8.1  /  Alb  3.1[L]  /  TBili  0.7  /  DBili  x   /  AST  19  /  ALT  19  /  AlkPhos  149[H]  11-    PT/INR - ( 2024 17:32 )   PT: 23.8 sec;   INR: 2.05          PTT - ( 2024 17:32 )  PTT:38.3 sec  Urinalysis Basic - ( 2024 17:32 )    Color: Yellow / Appearance: Clear / S.014 / pH: x  Gluc: 114 mg/dL / Ketone: Negative mg/dL  / Bili: Negative / Urobili: 0.2 mg/dL   Blood: x / Protein: Negative mg/dL / Nitrite: Negative   Leuk Esterase: Negative / RBC: x / WBC x   Sq Epi: x / Non Sq Epi: x / Bacteria: x      CAPILLARY BLOOD GLUCOSE      POCT Blood Glucose.: 103 mg/dL (2024 17:17)        RADIOLOGY & ADDITIONAL TESTS: Reviewed.    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 Patient reports that he was having his routine follow-up w/ his PCP when he started feeling SOB. Per chart review at PCP's office patient had clear lungs, satting ~95% on RA, and a oral temp of 99.1F but patient was recommended to present to [facility] ED as patient was concerned for possible PE given recent dental work and poor compliance to xarelto. At bedside patient reports feelings of dyspnea have resolved and does not report recent fevers/chills, VILCHIS, sore throat, cough, congestion, myalgias/arthralgias, N/V, diarrhea, oral/throat/chest pain when eating, or new rash. Patient reports poor compliance w/ deann xarelto (has been taking last 3 days but went ~2 weeks w/o it), biktravy (last took in Oct '24), and bactrim DS (last took in Oct '24) as he forgot 2/2 feeling depressed which started after a recent procedure at [facility] where they implanted anchors for upper dentures however patient reports dentures do not fit and has been unable to get in contact w/ the surgeons. Patient does not report interest in harming himself or other; has no prescribed antidepressives and neither does he follow w/ a psychiatrist.      Patient had 2 previous visits to [facility] ED on the 10/13-10/16/24 where he presented for LLE significant for cellulitis of L anterior leg. In both occasions patient was recommended for admission but AMA'd in both instances on PO antibiotics which he reports he did not fill; he isn't clear on onset. At bedside patient reports mild tenderness on L leg but does nor report any signs of purulence at home and cannot recall any new wounds. L ventral hallux ulcer w/o any signs of purulence w/o c/o pain however patient reports both feet are numb 2/2 neuropathy. Does not report previous prolonged IV abx for bacteremia, history of IVDU, or knowledge of any previous wound cultures.     ED course:  Vitals: Tmax 102.5F, HR , //85, RR 16-20, SpO2 % RA   Labs: WBC 16.37, Hgb 13.4, PLT, 207, PTT/PT/INR 38.3/23.8/2.05, D-dimer 276, Na 133, K 4.2, HCO3 25, BUN/Cr 10/1.35, ALP/AST//19/62, Tbili 0.7, Tropl I 6.7, Pro-, UA-> negative, lactate 1.3, limited RVP-> negative  EKG: sinus tachycardia, QTc 427; normal axis; no pathological q waves; non-specific ST-T wave changes  Imaging: CTPE-> No pulmonary embolism to the segmental branches. No report of consolidates, diffuse infiltrates and/or groundglass opacities. CXR-> pending report; no evidence of lobar consolidates, diffuse infiltrates, trachea midline, normal cardiac silhouette no pleural effusions. X Ray L foot-> Hallux soft tissue swelling with focal plantar ulceration. IP joint with eroded articular margins indeterminate for infection/septic versus inflammatory arthritic process  Interventions: acetaminophen 975mg PO x1, ibuprofen 800mg PO x1, zosyn 3.375g x1, vancomycin 1500mg x1, NS 2.6L  Consults: none (2024 02:12)    -----------------------------------------------------------------------  *****Copy/Paste individual medications to appropriate section (Continue, Start, Stop, Change Dose)*****                              *****Only copy inpatient medications that the patient will use at home*****    -------------------------------HOME and CURRENT MEDICATIONS------------------------------------    HOME MEDICATIONS/PRESCRIPTIONS:  Bactrim  mg-160 mg oral tablet: 1 tab(s) orally once a day   Biktarvy 50 mg-200 mg-25 mg oral tablet: 1 tab(s) orally once a day  pantoprazole 40 mg oral delayed release tablet: 1 tab(s) orally once a day  Xarelto 10 mg oral tablet: 1 tab(s) orally once a day        MEDICATIONS  (STANDING):  nicotine -  14 mG/24Hr(s) Patch 1 Patch Transdermal daily  pantoprazole    Tablet 40 milliGRAM(s) Oral before breakfast  rivaroxaban 10 milliGRAM(s) Oral with dinner  trimethoprim  160 mG/sulfamethoxazole 800 mG 1 Tablet(s) Oral daily  vancomycin  IVPB 1250 milliGRAM(s) IV Intermittent every 12 hours    MEDICATIONS  (PRN):  acetaminophen     Tablet .. 650 milliGRAM(s) Oral every 6 hours PRN Temp greater or equal to 38C (100.4F), Mild Pain (1 - 3)      ----------------------------------MEDICATION RECONCILIATION----------------------------------------        STOP Home Medications:            CONTINUE Home Medications:            START (NEW Home Medications):            CHANGE DOSE of Home Medication:   CC: Patient is a 54y old  Male who presents with a chief complaint of Severe sepsis 2/2 LLE cellulitis (2024 02:12)      INTERVAL EVENTS: MAHESH    SUBJECTIVE / INTERVAL HPI: Patient seen and examined at bedside. Patient states he is overall feeling well today and reports a mild frontal headache, which is not unusual for him. He also reports residual nasal congestion since his dental procedure. He denies any shortness of breath today. Denies any cough, chest pain, fever, chills, leg pain, abdominal pain, nausea/vomiting.    ROS: negative unless otherwise stated above.    VITAL SIGNS:  Vitals Interpretation review:  Vital Signs Last 24 Hrs  T(C): 36.4 (2024 06:02), Max: 39.2 (2024 17:02)  T(F): 97.6 (2024 06:02), Max: 102.5 (2024 17:02)  HR: 72 (2024 06:02) (72 - 122)  BP: 108/66 (2024 06:02) (100/63 - 141/85)  BP(mean): 83 (2024 02:07) (83 - 87)  RR: 17 (2024 06:02) (16 - 20)  SpO2: 99% (2024 06:02) (96% - 100%)    Parameters below as of 2024 06:02  Patient On (Oxygen Delivery Method): room air            PHYSICAL EXAM:  General: NAD, comfortable in bed.   HEENT: MMM, PERRL, EOMI, anicteric  Neck: supple  Cardiovascular: +S1/S2; RRR, warm & dry  Respiratory: CTA B/L; no W/R/R  Gastrointestinal: soft, NT/ND, bsx4, no jaundice  Extremities: WWP; no edema, clubbing or cyanosis, +ROM  Vascular: 2+ radial, DP/PT pulses B/L  Neurological: AAOx3; no focal deficits  Dermatological: Erythema, warmth, swelling and mild tenderness of L anterior leg with dark skin discoloration and induration; no purulence, fluctuance, or open wound. Amputated L 3rd toe and L hallux with chronic ventral ulcer w/o signs of erythema, warmth, swelling, or overt purulence expressed. B/L feet with chronic toe deformities.     MEDICATIONS:  MEDICATIONS  (STANDING):  nicotine -  14 mG/24Hr(s) Patch 1 Patch Transdermal daily  pantoprazole    Tablet 40 milliGRAM(s) Oral before breakfast  rivaroxaban 10 milliGRAM(s) Oral with dinner  trimethoprim  160 mG/sulfamethoxazole 800 mG 1 Tablet(s) Oral daily  vancomycin  IVPB 1250 milliGRAM(s) IV Intermittent every 12 hours    MEDICATIONS  (PRN):  acetaminophen     Tablet .. 650 milliGRAM(s) Oral every 6 hours PRN Temp greater or equal to 38C (100.4F), Mild Pain (1 - 3)      ALLERGIES:  Allergies    cefepime (Unknown)    Intolerances        LABS:  Labs Interpretation:                         13.4   16.37 )-----------( 207      ( 2024 17:32 )             40.0         133  |  101  |  10  ----------------------------<  114[H]  4.2   |  25  |  1.35[H]    Ca    8.7      2024 17:32    TPro  8.1  /  Alb  3.1[L]  /  TBili  0.7  /  DBili  x   /  AST  19  /  ALT  19  /  AlkPhos  149[H]  11-    PT/INR - ( 2024 17:32 )   PT: 23.8 sec;   INR: 2.05          PTT - ( 2024 17:32 )  PTT:38.3 sec  Urinalysis Basic - ( 2024 17:32 )    Color: Yellow / Appearance: Clear / S.014 / pH: x  Gluc: 114 mg/dL / Ketone: Negative mg/dL  / Bili: Negative / Urobili: 0.2 mg/dL   Blood: x / Protein: Negative mg/dL / Nitrite: Negative   Leuk Esterase: Negative / RBC: x / WBC x   Sq Epi: x / Non Sq Epi: x / Bacteria: x      CAPILLARY BLOOD GLUCOSE      POCT Blood Glucose.: 103 mg/dL (2024 17:17)        RADIOLOGY & ADDITIONAL TESTS: Reviewed.    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 Patient reports that he was having his routine follow-up w/ his PCP when he started feeling SOB. Per chart review at PCP's office patient had clear lungs, satting ~95% on RA, and a oral temp of 99.1F but patient was recommended to present to [facility] ED as patient was concerned for possible PE given recent dental work and poor compliance to xarelto. At bedside patient reports feelings of dyspnea have resolved and does not report recent fevers/chills, VILCHIS, sore throat, cough, congestion, myalgias/arthralgias, N/V, diarrhea, oral/throat/chest pain when eating, or new rash. Patient reports poor compliance w/ deann xarelto (has been taking last 3 days but went ~2 weeks w/o it), biktravy (last took in Oct '24), and bactrim DS (last took in Oct '24) as he forgot 2/2 feeling depressed which started after a recent procedure at [facility] where they implanted anchors for upper dentures however patient reports dentures do not fit and has been unable to get in contact w/ the surgeons. Patient does not report interest in harming himself or other; has no prescribed antidepressives and neither does he follow w/ a psychiatrist.      Patient had 2 previous visits to [facility] ED on the 10/13-10/16/24 where he presented for LLE significant for cellulitis of L anterior leg. In both occasions patient was recommended for admission but AMA'd in both instances on PO antibiotics which he reports he did not fill; he isn't clear on onset. At bedside patient reports mild tenderness on L leg but does nor report any signs of purulence at home and cannot recall any new wounds. L ventral hallux ulcer w/o any signs of purulence w/o c/o pain however patient reports both feet are numb 2/2 neuropathy. Does not report previous prolonged IV abx for bacteremia, history of IVDU, or knowledge of any previous wound cultures.     ED course:  Vitals: Tmax 102.5F, HR , //85, RR 16-20, SpO2 % RA   Labs: WBC 16.37, Hgb 13.4, PLT, 207, PTT/PT/INR 38.3/23.8/2.05, D-dimer 276, Na 133, K 4.2, HCO3 25, BUN/Cr 10/1.35, ALP/AST//19/62, Tbili 0.7, Tropl I 6.7, Pro-, UA-> negative, lactate 1.3, limited RVP-> negative  EKG: sinus tachycardia, QTc 427; normal axis; no pathological q waves; non-specific ST-T wave changes  Imaging: CTPE-> No pulmonary embolism to the segmental branches. No report of consolidates, diffuse infiltrates and/or groundglass opacities. CXR-> pending report; no evidence of lobar consolidates, diffuse infiltrates, trachea midline, normal cardiac silhouette no pleural effusions. X Ray L foot-> Hallux soft tissue swelling with focal plantar ulceration. IP joint with eroded articular margins indeterminate for infection/septic versus inflammatory arthritic process  Interventions: acetaminophen 975mg PO x1, ibuprofen 800mg PO x1, zosyn 3.375g x1, vancomycin 1500mg x1, NS 2.6L  Consults: none (2024 02:12)    -----------------------------------------------------------------------  *****Copy/Paste individual medications to appropriate section (Continue, Start, Stop, Change Dose)*****                              *****Only copy inpatient medications that the patient will use at home*****    -------------------------------HOME and CURRENT MEDICATIONS------------------------------------    HOME MEDICATIONS/PRESCRIPTIONS:  Bactrim  mg-160 mg oral tablet: 1 tab(s) orally once a day   Biktarvy 50 mg-200 mg-25 mg oral tablet: 1 tab(s) orally once a day  pantoprazole 40 mg oral delayed release tablet: 1 tab(s) orally once a day  Xarelto 10 mg oral tablet: 1 tab(s) orally once a day        MEDICATIONS  (STANDING):  nicotine -  14 mG/24Hr(s) Patch 1 Patch Transdermal daily  pantoprazole    Tablet 40 milliGRAM(s) Oral before breakfast  rivaroxaban 10 milliGRAM(s) Oral with dinner  trimethoprim  160 mG/sulfamethoxazole 800 mG 1 Tablet(s) Oral daily  vancomycin  IVPB 1250 milliGRAM(s) IV Intermittent every 12 hours    MEDICATIONS  (PRN):  acetaminophen     Tablet .. 650 milliGRAM(s) Oral every 6 hours PRN Temp greater or equal to 38C (100.4F), Mild Pain (1 - 3)      ----------------------------------MEDICATION RECONCILIATION----------------------------------------        STOP Home Medications:            CONTINUE Home Medications:            START (NEW Home Medications):            CHANGE DOSE of Home Medication:   CC: Patient is a 54y old  Male who presents with a chief complaint of Severe sepsis 2/2 LLE cellulitis (2024 02:12)      INTERVAL EVENTS: MAHESH    SUBJECTIVE / INTERVAL HPI: Patient seen and examined at bedside. Patient states he is overall feeling well today and reports a mild frontal headache, which is not unusual for him. He also reports residual nasal congestion since his dental procedure. He denies any shortness of breath today. Denies any cough, chest pain, fever, chills, leg pain, abdominal pain, nausea/vomiting.    ROS: negative unless otherwise stated above.    VITAL SIGNS:  Vitals Interpretation review:  Vital Signs Last 24 Hrs  T(C): 36.4 (2024 06:02), Max: 39.2 (2024 17:02)  T(F): 97.6 (2024 06:02), Max: 102.5 (2024 17:02)  HR: 72 (2024 06:02) (72 - 122)  BP: 108/66 (2024 06:02) (100/63 - 141/85)  BP(mean): 83 (2024 02:07) (83 - 87)  RR: 17 (2024 06:02) (16 - 20)  SpO2: 99% (2024 06:02) (96% - 100%)    Parameters below as of 2024 06:02  Patient On (Oxygen Delivery Method): room air            PHYSICAL EXAM:  General: NAD, comfortable in bed.   HEENT: MMM, PERRL, EOMI, anicteric  Neck: supple  Cardiovascular: +S1/S2; RRR, warm & dry  Respiratory: CTA B/L; no W/R/R  Gastrointestinal: soft, NT/ND, bsx4, no jaundice  Extremities: WWP; no edema, clubbing or cyanosis, +ROM  Vascular: 2+ radial, DP/PT pulses B/L  Neurological: AAOx3; no focal deficits  Dermatological: Erythema, warmth, swelling and mild tenderness of L anterior leg with dark skin discoloration and induration; no purulence, fluctuance, or open wound. Amputated L 3rd toe and L hallux with chronic ventral ulcer w/o signs of erythema, warmth, swelling, or overt purulence expressed. B/L feet with chronic toe deformities.     MEDICATIONS:  MEDICATIONS  (STANDING):  nicotine -  14 mG/24Hr(s) Patch 1 Patch Transdermal daily  pantoprazole    Tablet 40 milliGRAM(s) Oral before breakfast  rivaroxaban 10 milliGRAM(s) Oral with dinner  trimethoprim  160 mG/sulfamethoxazole 800 mG 1 Tablet(s) Oral daily  vancomycin  IVPB 1250 milliGRAM(s) IV Intermittent every 12 hours    MEDICATIONS  (PRN):  acetaminophen     Tablet .. 650 milliGRAM(s) Oral every 6 hours PRN Temp greater or equal to 38C (100.4F), Mild Pain (1 - 3)      ALLERGIES:  Allergies    cefepime (Unknown)    Intolerances        LABS:  Labs Interpretation:                         13.4   16.37 )-----------( 207      ( 2024 17:32 )             40.0         133  |  101  |  10  ----------------------------<  114[H]  4.2   |  25  |  1.35[H]    Ca    8.7      2024 17:32    TPro  8.1  /  Alb  3.1[L]  /  TBili  0.7  /  DBili  x   /  AST  19  /  ALT  19  /  AlkPhos  149[H]  11-    PT/INR - ( 2024 17:32 )   PT: 23.8 sec;   INR: 2.05          PTT - ( 2024 17:32 )  PTT:38.3 sec  Urinalysis Basic - ( 2024 17:32 )    Color: Yellow / Appearance: Clear / S.014 / pH: x  Gluc: 114 mg/dL / Ketone: Negative mg/dL  / Bili: Negative / Urobili: 0.2 mg/dL   Blood: x / Protein: Negative mg/dL / Nitrite: Negative   Leuk Esterase: Negative / RBC: x / WBC x   Sq Epi: x / Non Sq Epi: x / Bacteria: x      CAPILLARY BLOOD GLUCOSE      POCT Blood Glucose.: 103 mg/dL (2024 17:17)        RADIOLOGY & ADDITIONAL TESTS: Reviewed.    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 Patient reports that he was having his routine follow-up w/ his PCP when he started feeling SOB. Per chart review at PCP's office patient had clear lungs, satting ~95% on RA, and a oral temp of 99.1F but patient was recommended to present to [facility] ED as patient was concerned for possible PE given recent dental work and poor compliance to xarelto. At bedside patient reports feelings of dyspnea have resolved and does not report recent fevers/chills, VILCHIS, sore throat, cough, congestion, myalgias/arthralgias, N/V, diarrhea, oral/throat/chest pain when eating, or new rash. Patient reports poor compliance w/ deann xarelto (has been taking last 3 days but went ~2 weeks w/o it), biktravy (last took in Oct '24), and bactrim DS (last took in Oct '24) as he forgot 2/2 feeling depressed which started after a recent procedure at [facility] where they implanted anchors for upper dentures however patient reports dentures do not fit and has been unable to get in contact w/ the surgeons. Patient does not report interest in harming himself or other; has no prescribed antidepressives and neither does he follow w/ a psychiatrist.      Patient had 2 previous visits to [facility] ED on the 10/13-10/16/24 where he presented for LLE significant for cellulitis of L anterior leg. In both occasions patient was recommended for admission but AMA'd in both instances on PO antibiotics which he reports he did not fill; he isn't clear on onset. At bedside patient reports mild tenderness on L leg but does nor report any signs of purulence at home and cannot recall any new wounds. L ventral hallux ulcer w/o any signs of purulence w/o c/o pain however patient reports both feet are numb 2/2 neuropathy. Does not report previous prolonged IV abx for bacteremia, history of IVDU, or knowledge of any previous wound cultures.     ED course:  Vitals: Tmax 102.5F, HR , //85, RR 16-20, SpO2 % RA   Labs: WBC 16.37, Hgb 13.4, PLT, 207, PTT/PT/INR 38.3/23.8/2.05, D-dimer 276, Na 133, K 4.2, HCO3 25, BUN/Cr 10/1.35, ALP/AST//19/62, Tbili 0.7, Tropl I 6.7, Pro-, UA-> negative, lactate 1.3, limited RVP-> negative  EKG: sinus tachycardia, QTc 427; normal axis; no pathological q waves; non-specific ST-T wave changes  Imaging: CTPE-> No pulmonary embolism to the segmental branches. No report of consolidates, diffuse infiltrates and/or groundglass opacities. CXR-> pending report; no evidence of lobar consolidates, diffuse infiltrates, trachea midline, normal cardiac silhouette no pleural effusions. X Ray L foot-> Hallux soft tissue swelling with focal plantar ulceration. IP joint with eroded articular margins indeterminate for infection/septic versus inflammatory arthritic process  Interventions: acetaminophen 975mg PO x1, ibuprofen 800mg PO x1, zosyn 3.375g x1, vancomycin 1500mg x1, NS 2.6L  Consults: none (2024 02:12)    -----------------------------------------------------------------------  *****Copy/Paste individual medications to appropriate section (Continue, Start, Stop, Change Dose)*****                              *****Only copy inpatient medications that the patient will use at home*****    -------------------------------HOME and CURRENT MEDICATIONS------------------------------------    HOME MEDICATIONS/PRESCRIPTIONS:  Bactrim  mg-160 mg oral tablet: 1 tab(s) orally once a day   Biktarvy 50 mg-200 mg-25 mg oral tablet: 1 tab(s) orally once a day  pantoprazole 40 mg oral delayed release tablet: 1 tab(s) orally once a day  Xarelto 10 mg oral tablet: 1 tab(s) orally once a day        MEDICATIONS  (STANDING):  nicotine -  14 mG/24Hr(s) Patch 1 Patch Transdermal daily  pantoprazole    Tablet 40 milliGRAM(s) Oral before breakfast  rivaroxaban 10 milliGRAM(s) Oral with dinner  trimethoprim  160 mG/sulfamethoxazole 800 mG 1 Tablet(s) Oral daily  vancomycin  IVPB 1250 milliGRAM(s) IV Intermittent every 12 hours    MEDICATIONS  (PRN):  acetaminophen     Tablet .. 650 milliGRAM(s) Oral every 6 hours PRN Temp greater or equal to 38C (100.4F), Mild Pain (1 - 3)     CC: Patient is a 54y old  Male who presents with a chief complaint of Severe sepsis 2/2 LLE cellulitis (2024 02:12)      INTERVAL EVENTS: MAHESH    SUBJECTIVE / INTERVAL HPI: Patient seen and examined at bedside. Patient states he is overall feeling well today and reports a mild frontal headache, which is not unusual for him. He also reports residual nasal congestion since his dental procedure. He denies any shortness of breath today. Denies any cough, chest pain, fever, chills, leg pain, abdominal pain, nausea/vomiting.    ROS: negative unless otherwise stated above.    VITAL SIGNS:  Vitals Interpretation review:  Vital Signs Last 24 Hrs  T(C): 36.4 (2024 06:02), Max: 39.2 (2024 17:02)  T(F): 97.6 (2024 06:02), Max: 102.5 (2024 17:02)  HR: 72 (2024 06:02) (72 - 122)  BP: 108/66 (2024 06:02) (100/63 - 141/85)  BP(mean): 83 (2024 02:07) (83 - 87)  RR: 17 (2024 06:02) (16 - 20)  SpO2: 99% (2024 06:02) (96% - 100%)    Parameters below as of 2024 06:02  Patient On (Oxygen Delivery Method): room air            PHYSICAL EXAM:  General: NAD, comfortable in bed.   HEENT: MMM, PERRL, EOMI, anicteric  Neck: supple  Cardiovascular: +S1/S2; RRR, warm & dry  Respiratory: CTA B/L; no W/R/R  Gastrointestinal: soft, NT/ND, bsx4, no jaundice  Extremities: WWP; no edema, clubbing or cyanosis, +ROM  Vascular: 2+ radial, DP/PT pulses B/L  Neurological: AAOx3; no focal deficits  Dermatological: Erythema, warmth, swelling and mild tenderness of L anterior leg with dark skin discoloration and induration; no purulence, fluctuance, or open wound. Amputated L 3rd toe and L hallux with chronic ventral ulcer w/o signs of erythema, warmth, swelling, or overt purulence expressed. B/L feet with chronic toe deformities.     MEDICATIONS:  MEDICATIONS  (STANDING):  nicotine -  14 mG/24Hr(s) Patch 1 Patch Transdermal daily  pantoprazole    Tablet 40 milliGRAM(s) Oral before breakfast  rivaroxaban 10 milliGRAM(s) Oral with dinner  trimethoprim  160 mG/sulfamethoxazole 800 mG 1 Tablet(s) Oral daily  vancomycin  IVPB 1250 milliGRAM(s) IV Intermittent every 12 hours    MEDICATIONS  (PRN):  acetaminophen     Tablet .. 650 milliGRAM(s) Oral every 6 hours PRN Temp greater or equal to 38C (100.4F), Mild Pain (1 - 3)      ALLERGIES:  Allergies    cefepime (Unknown)    Intolerances        LABS:  Labs Interpretation:                         13.4   16.37 )-----------( 207      ( 2024 17:32 )             40.0     11-    133  |  101  |  10  ----------------------------<  114[H]  4.2   |  25  |  1.35[H]    Ca    8.7      2024 17:32    TPro  8.1  /  Alb  3.1[L]  /  TBili  0.7  /  DBili  x   /  AST  19  /  ALT  19  /  AlkPhos  149[H]  11-21    PT/INR - ( 2024 17:32 )   PT: 23.8 sec;   INR: 2.05          PTT - ( 2024 17:32 )  PTT:38.3 sec  Urinalysis Basic - ( 2024 17:32 )    Color: Yellow / Appearance: Clear / S.014 / pH: x  Gluc: 114 mg/dL / Ketone: Negative mg/dL  / Bili: Negative / Urobili: 0.2 mg/dL   Blood: x / Protein: Negative mg/dL / Nitrite: Negative   Leuk Esterase: Negative / RBC: x / WBC x   Sq Epi: x / Non Sq Epi: x / Bacteria: x      CAPILLARY BLOOD GLUCOSE      POCT Blood Glucose.: 103 mg/dL (2024 17:17)        RADIOLOGY & ADDITIONAL TESTS: Reviewed

## 2024-11-22 NOTE — H&P ADULT - PROBLEM SELECTOR PLAN 3
Patient diagnosed in 1988 but poorly compliant on antiviral therapy and prophylaxis. Last VL undetectable w/ CD4 182 both as of Jul '24 however patient not currently taking Biktarvy and Bactrim since Oct '24 due to feeling depressed. Only report of opportunistic infection/AIDS defining illness has been PJP many years ago. C/o dyspnea on initial ED presentation however CXR and CT chest clear. 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 Patient follows w/ PCP in [facility].    Plan:  - Start Bactrim DS BID for PJP ppx  - F/u cryptococcal antigen; do not restart Biktarvy at this time   - F/u AM VL and CD4 count Patient diagnosed in 1988 but poorly compliant on antiviral therapy and prophylaxis. Last VL undetectable w/ CD4 182 both as of Jul '24 however patient not currently taking Biktarvy and Bactrim since Oct '24 due to feeling depressed. Only report of opportunistic infection/AIDS defining illness has been PJP many years ago. C/o dyspnea on initial ED presentation however CXR and CT chest clear. 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 Patient follows w/ PCP in [facility].    Plan:  - Start Bactrim DS daily for PJP ppx  - F/u cryptococcal antigen; do not restart Biktarvy at this time   - F/u AM VL and CD4 count

## 2024-11-22 NOTE — PROGRESS NOTE ADULT - PROBLEM SELECTOR PLAN 2
Patient w/ evidence of cellulitis on L anterior leg which has been present at least since 10/13/24 presenting to ED twice for similar complaint but refusing admissions preferring PO antibiotics however had not been taking them 2/2 recent depressive episode after reportedly not receiving adequate denture implantation. Patient has had an extensive history of cellulitis in the past w/ reported L LE 3rd digit OM s/p amputation and previous admission to [facility] in 2023 cellulitis in same site however patient AMA'd at that time. Although physical exam does not reveal purulence or fluctuance of affected site suggesting underlying abscess given extensive history of IV abx will cover for MRSA. No open ulcers on affected site and patient w/o history of diabetes for which Pseudomonas coverage not required.     Plan:  - C/w vancomycin 1250mg q12; trough before 4th dose on 11/23/24 @ 5 am  - F/u ESR/CRP  - Management as above Patient w/ evidence of cellulitis on L anterior leg which has been present at least since 10/13/24 presenting to ED twice for similar complaint but refusing admissions and preferring PO antibiotics however had not been taking them 2/2 recent depressive episode after reportedly not receiving adequate denture implantation. Patient has had an extensive history of cellulitis in the past w/ reported L LE 3rd digit OM s/p amputation and previous admission to [facility] in 2023 cellulitis in same site however patient AMA'd at that time. Although physical exam does not reveal purulence or fluctuance of affected site suggesting underlying abscess given extensive history of IV abx will cover for MRSA. No open ulcers on affected site and patient w/o history of diabetes for which Pseudomonas coverage not required.     Plan:  - C/w vancomycin 1250mg q12; trough before 4th dose on 11/23/24 @ 5 am  - F/u ESR/CRP  - Management as above Patient w/ evidence of cellulitis on L anterior leg which has been present at least since 10/13/24 presenting to ED twice for similar complaint but refusing admissions and preferring PO antibiotics however had not been taking them 2/2 recent depressive episode after reportedly not receiving adequate denture implantation. Patient has had an extensive history of cellulitis in the past w/ reported L LE 3rd digit OM s/p amputation and previous admission to [facility] in 2023 cellulitis in same site however patient AMA'd at that time. Although physical exam does not reveal purulence or fluctuance of affected site suggesting underlying abscess given extensive history of IV abx will cover for MRSA. No open ulcers on affected site and patient w/o history of diabetes for which Pseudomonas coverage not required.     STABLE.     Plan:  - C/w vancomycin 1250mg q12; trough before 4th dose on 11/23/24 @ 5 am  - F/u ESR/CRP  - F/u BCX x2  - Podiatry consult  - Management as above

## 2024-11-22 NOTE — PATIENT PROFILE ADULT - FALL HARM RISK - RISK INTERVENTIONS

## 2024-11-22 NOTE — H&P ADULT - PROBLEM SELECTOR PLAN 2
Patient w/ evidence of cellulitis on L anterior leg which has been present at least since 11/13/24 presenting to ED twice for similar complaint but refusing admissions preferring PO antibiotics however had not been taking them 2/2 recent depressive episode after reportedly not receiving adequate denture implantation. Patient has had an extensive history of cellulitis in the past w/ reported L LE 3rd digit OM s/p amputation and previous admission to [facility] in 2023 cellulitis in same site however patient AMA'd at that time. Although physical exam does not reveal purulence or fluctuance of affected site suggesting underlying abscess given extensive history of IV abx will cover for MRSA. No open ulcers on affected site and patient w/o history of diabetes for which Pseudomonas coverage not required.     Plan:  - C/w vancomycin 1500mg q12; trough before 4th dose on 11/23/24 @ 5 am  - Management as above Patient w/ evidence of cellulitis on L anterior leg which has been present at least since 10/13/24 presenting to ED twice for similar complaint but refusing admissions preferring PO antibiotics however had not been taking them 2/2 recent depressive episode after reportedly not receiving adequate denture implantation. Patient has had an extensive history of cellulitis in the past w/ reported L LE 3rd digit OM s/p amputation and previous admission to [facility] in 2023 cellulitis in same site however patient AMA'd at that time. Although physical exam does not reveal purulence or fluctuance of affected site suggesting underlying abscess given extensive history of IV abx will cover for MRSA. No open ulcers on affected site and patient w/o history of diabetes for which Pseudomonas coverage not required.     Plan:  - C/w vancomycin 1250mg q12; trough before 4th dose on 11/23/24 @ 5 am  - F/u ESR/CRP  - Management as above

## 2024-11-22 NOTE — PROGRESS NOTE ADULT - PROBLEM SELECTOR PLAN 4
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 Patient is now prescribed xarelto 10mg PO daily however not compliant w/ doses taken last 3 days however did not take for ~2 weeks prior because of depressive symptoms. Patient had c/f PE given dyspnea however CTPE negative w/o elevated markers of heart strain w/ d-dimer <300. DVT r/o w/ US on 10/13/24.     Plan:  - C/w xarelto 10mg PO daily Patient diagnosed in 1988 but poorly compliant on antiviral therapy and prophylaxis. Last VL undetectable w/ CD4 182 both as of Jul '24 however patient not currently taking Biktarvy and Bactrim since Oct '24 due to feeling depressed. Only report of opportunistic infection/AIDS defining illness has been PJP many years ago. C/o dyspnea on initial ED presentation however CXR and CT chest clear. 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 Patient follows w/ PCP in [facility].    Plan:  - Continue Bactrim DS daily for PJP ppx  - F/u cryptococcal antigen; do not restart Biktarvy at this time   - F/u AM VL and CD4 count Patient diagnosed in 1988 but poorly compliant on antiviral therapy and prophylaxis. Last VL undetectable w/ CD4 182 both as of Jul '24 however patient not currently taking Biktarvy and Bactrim since Oct '24 due to feeling depressed. Only report of opportunistic infection/AIDS defining illness has been PJP many years ago. C/o dyspnea on initial ED presentation however CXR and CT chest clear. 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 Patient follows w/ PCP in [facility].    Plan:  - Continue Bactrim DS daily for PJP ppx  - F/u cryptococcal antigen; do not restart Biktarvy at this time given IRIS concern  - F/u AM VL and CD4 count  - continue SSTI infection control as above  - outpatient collateral for compliance assessment

## 2024-11-22 NOTE — CONSULT NOTE ADULT - ASSESSMENT
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 Podiatry consulted to evaluate L foot wound. Of note, WBC 8.95, ESR is elevated to 71, and CRP of 119.3. XR's show cortical erosion and bony changes concerning for OM at the L distal hallux. This correlates with +PTB during clinical exam. High suspicion for OM at L distal hallux vs SSTI.     Plan:  - Pt evaluated and chart reviewed  - Deep wound cx taken at L distal hallux wound; will f/u results  - Recc MRI to evaluate extent/ rule out OM at L distal hallux  - c/w broad spectrum IV ABX  - Sharp excisional debridement performed at L distal hallux wound to remove hyperkeratotic tissue using a scalpel, down to and including the level of dermis; pt tolerated debridement well  - Local wound care: Saline WTD DSD  - Pt can Heel WBAT to LLE  - rest of care per primary team    Podiatry will follow. Plan d/w attending.

## 2024-11-22 NOTE — H&P ADULT - ASSESSMENT
55 y/o M w/ PMHx of HIV/AIDS (poorly compliant w/ Biktarvy and ppx), multiple episodes of b/l LE cellulitis and OM, tobacco smoker, DVT/PE (on xarelto; poorly compliant), antiphospholipid syndrome, secondary syphilis treated 1 year ago, peripheral neuropathy, GERD, and depression that presents to the ED from his PCP office w/ complaints of dyspnea and found to have severe sepsis 2/2 LLE cellulitis. Will admit to F for IV antibiotics  55 y/o M w/ PMHx of HIV/AIDS (poorly compliant w/ Biktarvy and ppx), multiple episodes of b/l LE cellulitis and OM, tobacco smoker, DVT/PE (on xarelto; poorly compliant), antiphospholipid syndrome, secondary syphilis treated 1 year ago, peripheral neuropathy, GERD, and depression that presents to the ED from his PCP office w/ complaints of dyspnea and found to have severe sepsis 2/2 LLE cellulitis. Will admit to [facility] for IV antibiotics.   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 Will admit to [facility] for IV antibiotics.

## 2024-11-22 NOTE — H&P ADULT - ATTENDING COMMENTS
Pt. seen and examined by me earlier today; I have read Dr. Escalante's H&P, I agree w/ his findings and plan of care as documented; imaging reviewed; Pt. clinically improving, fever resolved, WBC normalized; cont. vanc, dose per level; elevate leg; check MRI foot; if e/o OM, will consult ID; Podiatry consulted, f/u recs; f/u cultures, CD4, viral load; restart ARVs if Cryptococcal Ag negative, need collateral from PCP; replete hypophosphatemia

## 2024-11-22 NOTE — PROGRESS NOTE ADULT - PROBLEM SELECTOR PLAN 3
Patient diagnosed in 1988 but poorly compliant on antiviral therapy and prophylaxis. Last VL undetectable w/ CD4 182 both as of Jul '24 however patient not currently taking Biktarvy and Bactrim since Oct '24 due to feeling depressed. Only report of opportunistic infection/AIDS defining illness has been PJP many years ago. C/o dyspnea on initial ED presentation however CXR and CT chest clear. 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 Patient follows w/ PCP in [facility].    Plan:  - Start Bactrim DS daily for PJP ppx  - F/u cryptococcal antigen; do not restart Biktarvy at this time   - F/u AM VL and CD4 count Patient diagnosed in 1988 but poorly compliant on antiviral therapy and prophylaxis. Last VL undetectable w/ CD4 182 both as of Jul '24 however patient not currently taking Biktarvy and Bactrim since Oct '24 due to feeling depressed. Only report of opportunistic infection/AIDS defining illness has been PJP many years ago. C/o dyspnea on initial ED presentation however CXR and CT chest clear. 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 Patient follows w/ PCP in [facility].    Plan:  - Continue Bactrim DS daily for PJP ppx  - F/u cryptococcal antigen; do not restart Biktarvy at this time   - F/u AM VL and CD4 count Patient w/ evidence of a chronic ventral ulcer of the L hallux since the summer per patient and poorly compliant with at home wound care. 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   X Ray L foot 11/21 per report shows Hallux soft tissue swelling with focal plantar ulceration. IP joint with eroded articular margins indeterminate for infection/septic versus inflammatory arthritic process.    Plan:  -Podiatry consulted, will f/u reccs on if further imaging is necessary to evaluate for infection/septic vs. inflammatory arthritis. 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   X Ray L foot 11/21 per report shows Hallux soft tissue swelling with focal plantar ulceration. IP joint with eroded articular margins indeterminate for infection/septic versus inflammatory arthritic process.    Plan:  -Podiatry consulted, will f/u recs on if further imaging is necessary to evaluate for infection/septic vs. inflammatory arthritis.

## 2024-11-22 NOTE — H&P ADULT - NSHPLABSRESULTS_GEN_ALL_CORE
LABS:                        13.4   16.37 )-----------( 207      ( 2024 17:32 )             40.0     11-    133  |  101  |  10  ----------------------------<  114[H]  4.2   |  25  |  1.35[H]    Ca    8.7      2024 17:32    TPro  8.1  /  Alb  3.1[L]  /  TBili  0.7  /  DBili  x   /  AST  19  /  ALT  19  /  AlkPhos  149[H]  11-21    PT/INR - ( 2024 17:32 )   PT: 23.8 sec;   INR: 2.05          PTT - ( 2024 17:32 )  PTT:38.3 sec  Urinalysis Basic - ( 2024 17:32 )    Color: Yellow / Appearance: Clear / S.014 / pH: x  Gluc: 114 mg/dL / Ketone: Negative mg/dL  / Bili: Negative / Urobili: 0.2 mg/dL   Blood: x / Protein: Negative mg/dL / Nitrite: Negative   Leuk Esterase: Negative / RBC: x / WBC x   Sq Epi: x / Non Sq Epi: x / Bacteria: x

## 2024-11-22 NOTE — CONSULT NOTE ADULT - SUBJECTIVE AND OBJECTIVE BOX
Attending: Dr. Alonzo Merrill    Patient is a 54y old  Male who presents with a chief complaint of Severe sepsis 2/2 LLE cellulitis (22 Nov 2024 07:40)      HPI:  53 y/o M w/ PMHx of HIV/AIDS (diagnosed in 1988; VL undetectable in Dec '23/CD4 182 in Jul '24; poorly compliant w/ Biktarvy and ppx), multiple episodes of b/l LE cellulitis and OM s/p L 3rd digit amputation 5 years ago, tobacco smoker (23-pack-pack year history; now 1/4 PPD ~2 months), DVT/PE (on xarelto; poorly compliant), antiphospholipid syndrome, secondary 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 Patient reports that he was having his routine follow-up w/ his PCP when he started feeling SOB. Per chart review at PCP's office patient had clear lungs, satting ~95% on RA, and a oral temp of 99.1F but patient was recommended to present to [facility] ED as patient was concerned for possible PE given recent dental work and poor compliance to xarelto. At bedside patient reports feelings of dyspnea have resolved and does not report recent fevers/chills, VILCHIS, sore throat, cough, congestion, myalgias/arthralgias, N/V, diarrhea, oral/throat/chest pain when eating, or new rash. Patient reports poor compliance w/ deann xarelto (has been taking last 3 days but went ~2 weeks w/o it), biktravy (last took in Oct '24), and bactrim DS (last took in Oct '24) as he forgot 2/2 feeling depressed which started after a recent procedure at [facility] where they implanted anchors for upper dentures however patient reports dentures do not fit and has been unable to get in contact w/ the surgeons. Patient does not report interest in harming himself or other; has no prescribed antidepressives and neither does he follow w/ a psychiatrist.      Patient had 2 previous visits to [facility] ED on the 10/13-10/16/24 where he presented for LLE significant for cellulitis of L anterior leg. In both occasions patient was recommended for admission but AMA'd in both instances on PO antibiotics which he reports he did not fill; he isn't clear on onset. At bedside patient reports mild tenderness on L leg but does nor report any signs of purulence at home and cannot recall any new wounds. L ventral hallux ulcer w/o any signs of purulence w/o c/o pain however patient reports both feet are numb 2/2 neuropathy. Does not report previous prolonged IV abx for bacteremia, history of IVDU, or knowledge of any previous wound cultures.     ED course:  Vitals: Tmax 102.5F, HR , //85, RR 16-20, SpO2 % RA   Labs: WBC 16.37, Hgb 13.4, PLT, 207, PTT/PT/INR 38.3/23.8/2.05, D-dimer 276, Na 133, K 4.2, HCO3 25, BUN/Cr 10/1.35, ALP/AST//19/62, Tbili 0.7, Tropl I 6.7, Pro-, UA-> negative, lactate 1.3, limited RVP-> negative  EKG: sinus tachycardia, QTc 427; normal axis; no pathological q waves; non-specific ST-T wave changes  Imaging: CTPE-> No pulmonary embolism to the segmental branches. No report of consolidates, diffuse infiltrates and/or groundglass opacities. CXR-> pending report; no evidence of lobar consolidates, diffuse infiltrates, trachea midline, normal cardiac silhouette no pleural effusions. X Ray L foot-> Hallux soft tissue swelling with focal plantar ulceration. IP joint with eroded articular margins indeterminate for infection/septic versus inflammatory arthritic process  Interventions: acetaminophen 975mg PO x1, ibuprofen 800mg PO x1, zosyn 3.375g x1, vancomycin 1500mg x1, NS 2.6L  Consults: none (22 Nov 2024 02:12)      Review of systems negative except per HPI and as stated below  General:	 no weakness; no fevers, no chills  Skin/Breast: no rash  Respiratory and Thorax: no SOB, no cough  Cardiovascular:	No chest pain  Gastrointestinal:	 no nausea, vomiting , diarrhea  Genitourinary:	no dysuria, no difficulty urinating, no hematuria  Musculoskeletal:	no weakness, no joint swelling/pain  Neurological:	no focal weakness/numbness  Endocrine:	no polyuria, no polydipsia    PAST MEDICAL & SURGICAL HISTORY:  Human immunodeficiency virus (HIV) infection      Osteomyelitis      Pulmonary embolism      DVT, lower extremity        Home Medications:  Biktarvy 50 mg-200 mg-25 mg oral tablet: 1 tab(s) orally once a day (22 Nov 2024 05:23)  pantoprazole 40 mg oral delayed release tablet: 1 tab(s) orally once a day (30 Dec 2023 00:48)  Xarelto 10 mg oral tablet: 1 tab(s) orally once a day (22 Nov 2024 05:22)    Allergies    cefepime (Unknown)    Intolerances      FAMILY HISTORY:    Social History:       LABS                        11.5   8.95  )-----------( 197      ( 22 Nov 2024 05:30 )             35.0     11-22    136  |  107  |  10  ----------------------------<  133[H]  3.5   |  22  |  0.98    Ca    8.0[L]      22 Nov 2024 05:30  Phos  1.6     11-22  Mg     2.0     11-22    TPro  8.1  /  Alb  3.1[L]  /  TBili  0.7  /  DBili  x   /  AST  19  /  ALT  19  /  AlkPhos  149[H]  11-21    PT/INR - ( 22 Nov 2024 05:30 )   PT: 13.5 sec;   INR: 1.18          PTT - ( 22 Nov 2024 05:30 )  PTT:31.6 sec  ESR: 71  CRP: --  11-22 @ 10:00    Vital Signs Last 24 Hrs  T(C): 37 (22 Nov 2024 12:30), Max: 37.8 (21 Nov 2024 19:15)  T(F): 98.6 (22 Nov 2024 12:30), Max: 100.1 (21 Nov 2024 19:15)  HR: 86 (22 Nov 2024 12:30) (72 - 99)  BP: 120/76 (22 Nov 2024 12:30) (100/63 - 122/67)  BP(mean): 91 (22 Nov 2024 12:30) (83 - 91)  RR: 18 (22 Nov 2024 12:30) (16 - 20)  SpO2: 98% (22 Nov 2024 12:30) (96% - 99%)    Parameters below as of 22 Nov 2024 12:30  Patient On (Oxygen Delivery Method): room air        PHYSICAL EXAM  General: NAD, AA0x3    Lower Extremity Focused:  Vasc:  Derm:  Neuro:  MSK:     RADIOLOGY                       Attending: Dr. Alonzo Merrill    Patient is a 54y old  Male who presents with a chief complaint of Severe sepsis 2/2 LLE cellulitis (22 Nov 2024 07:40)      HPI:  53 y/o M w/ PMHx of HIV/AIDS (diagnosed in 1988; VL undetectable in Dec '23/CD4 182 in Jul '24; poorly compliant w/ Biktarvy and ppx), multiple episodes of b/l LE cellulitis and OM s/p L 3rd digit amputation 5 years ago, tobacco smoker (23-pack-pack year history; now 1/4 PPD ~2 months), DVT/PE (on xarelto; poorly compliant), antiphospholipid syndrome, secondary 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 Patient reports that he was having his routine follow-up w/ his PCP when he started feeling SOB. Per chart review at PCP's office patient had clear lungs, satting ~95% on RA, and a oral temp of 99.1F but patient was recommended to present to [facility] ED as patient was concerned for possible PE given recent dental work and poor compliance to xarelto. At bedside patient reports feelings of dyspnea have resolved and does not report recent fevers/chills, VILCHIS, sore throat, cough, congestion, myalgias/arthralgias, N/V, diarrhea, oral/throat/chest pain when eating, or new rash. Patient reports poor compliance w/ deann xarelto (has been taking last 3 days but went ~2 weeks w/o it), biktravy (last took in Oct '24), and bactrim DS (last took in Oct '24) as he forgot 2/2 feeling depressed which started after a recent procedure at [facility] where they implanted anchors for upper dentures however patient reports dentures do not fit and has been unable to get in contact w/ the surgeons. Patient does not report interest in harming himself or other; has no prescribed antidepressives and neither does he follow w/ a psychiatrist.      Patient had 2 previous visits to [facility] ED on the 10/13-10/16/24 where he presented for LLE significant for cellulitis of L anterior leg. In both occasions patient was recommended for admission but AMA'd in both instances on PO antibiotics which he reports he did not fill; he isn't clear on onset. At bedside patient reports mild tenderness on L leg but does nor report any signs of purulence at home and cannot recall any new wounds. L ventral hallux ulcer w/o any signs of purulence w/o c/o pain however patient reports both feet are numb 2/2 neuropathy. Does not report previous prolonged IV abx for bacteremia, history of IVDU, or knowledge of any previous wound cultures.       Podiatry  53 y/o M w/ PMHx of HIV/AIDS (diagnosed in 1988; VL undetectable in Dec '23/CD4 182 in Jul '24; poorly compliant w/ Biktarvy and ppx), multiple episodes of b/l LE cellulitis and OM s/p L 3rd digit amputation 5 years ago, tobacco smoker (23-pack-pack year history; now 1/4 PPD ~2 months), DVT/PE (on xarelto; poorly compliant), antiphospholipid syndrome, secondary 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 Podiatry consulted to evaluate L foot wound. Pt noted he first noticed a wound on his L big toe this past summer. He sees a podiatrist outpt, a Dr. Leyden, who was debriding the wound regularly and doing dressing changes utilizing either betadine or silver sulfadiazine ointment. Pt noted he notices bloody drainage in his socks daily because of this wound. He also mentioned he had an ulcer on the same toe the summer before however it healed. No other pedal complaints at this time. Pt denies N/V/F/SOB/CP at time of consult.           ED course:  Vitals: Tmax 102.5F, HR , //85, RR 16-20, SpO2 % RA   Labs: WBC 16.37, Hgb 13.4, PLT, 207, PTT/PT/INR 38.3/23.8/2.05, D-dimer 276, Na 133, K 4.2, HCO3 25, BUN/Cr 10/1.35, ALP/AST//19/62, Tbili 0.7, Tropl I 6.7, Pro-, UA-> negative, lactate 1.3, limited RVP-> negative  EKG: sinus tachycardia, QTc 427; normal axis; no pathological q waves; non-specific ST-T wave changes  Imaging: CTPE-> No pulmonary embolism to the segmental branches. No report of consolidates, diffuse infiltrates and/or groundglass opacities. CXR-> pending report; no evidence of lobar consolidates, diffuse infiltrates, trachea midline, normal cardiac silhouette no pleural effusions. X Ray L foot-> Hallux soft tissue swelling with focal plantar ulceration. IP joint with eroded articular margins indeterminate for infection/septic versus inflammatory arthritic process  Interventions: acetaminophen 975mg PO x1, ibuprofen 800mg PO x1, zosyn 3.375g x1, vancomycin 1500mg x1, NS 2.6L  Consults: none (22 Nov 2024 02:12)      Review of systems negative except per HPI and as stated below  General:	 no weakness; no fevers, no chills  Skin/Breast: no rash  Respiratory and Thorax: no SOB, no cough  Cardiovascular:	No chest pain  Gastrointestinal:	 no nausea, vomiting , diarrhea  Genitourinary:	no dysuria, no difficulty urinating, no hematuria  Musculoskeletal:	no weakness, no joint swelling/pain  Neurological:	no focal weakness/numbness  Endocrine:	no polyuria, no polydipsia    PAST MEDICAL & SURGICAL HISTORY:  Human immunodeficiency virus (HIV) infection      Osteomyelitis      Pulmonary embolism      DVT, lower extremity        Home Medications:  Biktarvy 50 mg-200 mg-25 mg oral tablet: 1 tab(s) orally once a day (22 Nov 2024 05:23)  pantoprazole 40 mg oral delayed release tablet: 1 tab(s) orally once a day (30 Dec 2023 00:48)  Xarelto 10 mg oral tablet: 1 tab(s) orally once a day (22 Nov 2024 05:22)    Allergies    cefepime (Unknown)    Intolerances      FAMILY HISTORY:    Social History:       LABS                        11.5   8.95  )-----------( 197      ( 22 Nov 2024 05:30 )             35.0     11-22    136  |  107  |  10  ----------------------------<  133[H]  3.5   |  22  |  0.98    Ca    8.0[L]      22 Nov 2024 05:30  Phos  1.6     11-22  Mg     2.0     11-22    TPro  8.1  /  Alb  3.1[L]  /  TBili  0.7  /  DBili  x   /  AST  19  /  ALT  19  /  AlkPhos  149[H]  11-21    PT/INR - ( 22 Nov 2024 05:30 )   PT: 13.5 sec;   INR: 1.18          PTT - ( 22 Nov 2024 05:30 )  PTT:31.6 sec  ESR: 71  CRP: --  11-22 @ 10:00    Vital Signs Last 24 Hrs  T(C): 37 (22 Nov 2024 12:30), Max: 37.8 (21 Nov 2024 19:15)  T(F): 98.6 (22 Nov 2024 12:30), Max: 100.1 (21 Nov 2024 19:15)  HR: 86 (22 Nov 2024 12:30) (72 - 99)  BP: 120/76 (22 Nov 2024 12:30) (100/63 - 122/67)  BP(mean): 91 (22 Nov 2024 12:30) (83 - 91)  RR: 18 (22 Nov 2024 12:30) (16 - 20)  SpO2: 98% (22 Nov 2024 12:30) (96% - 99%)    Parameters below as of 22 Nov 2024 12:30  Patient On (Oxygen Delivery Method): room air        PHYSICAL EXAM  General: NAD, AA0x3  Lower Extremity Focused:  Vasc: 2/4 DP/PT b/l. Erythema present to anterior mid leg. Edema present to L Hallux  Derm: L hallux wound present at plantar distal medial aspect. Hyperkeratotic wound edges with small 0.2x0.2cm wound, granular base, negative for malodor, negative for purulence. Serosanguinous drainage present. +PTB. Negative for proximal tracking/tunneling of wound  Neuro: Protective sensation is absent.  MSK:     RADIOLOGY  XR's show cortical erosion and bony changes highly suspicious for OM of L distal hallux                     Attending: Dr. Alonzo Merrill    Patient is a 54y old  Male who presents with a chief complaint of Severe sepsis 2/2 LLE cellulitis (22 Nov 2024 07:40)      HPI:  53 y/o M w/ PMHx of HIV/AIDS (diagnosed in 1988; VL undetectable in Dec '23/CD4 182 in Jul '24; poorly compliant w/ Biktarvy and ppx), multiple episodes of b/l LE cellulitis and OM s/p L 3rd digit amputation 5 years ago, tobacco smoker (23-pack-pack year history; now 1/4 PPD ~2 months), DVT/PE (on xarelto; poorly compliant), antiphospholipid syndrome, secondary 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 Patient reports that he was having his routine follow-up w/ his PCP when he started feeling SOB. Per chart review at PCP's office patient had clear lungs, satting ~95% on RA, and a oral temp of 99.1F but patient was recommended to present to [facility] ED as patient was concerned for possible PE given recent dental work and poor compliance to xarelto. At bedside patient reports feelings of dyspnea have resolved and does not report recent fevers/chills, VILCHIS, sore throat, cough, congestion, myalgias/arthralgias, N/V, diarrhea, oral/throat/chest pain when eating, or new rash. Patient reports poor compliance w/ deann xarelto (has been taking last 3 days but went ~2 weeks w/o it), biktravy (last took in Oct '24), and bactrim DS (last took in Oct '24) as he forgot 2/2 feeling depressed which started after a recent procedure at [facility] where they implanted anchors for upper dentures however patient reports dentures do not fit and has been unable to get in contact w/ the surgeons. Patient does not report interest in harming himself or other; has no prescribed antidepressives and neither does he follow w/ a psychiatrist.      Patient had 2 previous visits to [facility] ED on the 10/13-10/16/24 where he presented for LLE significant for cellulitis of L anterior leg. In both occasions patient was recommended for admission but AMA'd in both instances on PO antibiotics which he reports he did not fill; he isn't clear on onset. At bedside patient reports mild tenderness on L leg but does nor report any signs of purulence at home and cannot recall any new wounds. L ventral hallux ulcer w/o any signs of purulence w/o c/o pain however patient reports both feet are numb 2/2 neuropathy. Does not report previous prolonged IV abx for bacteremia, history of IVDU, or knowledge of any previous wound cultures.       Podiatry  53 y/o M w/ PMHx of HIV/AIDS (diagnosed in 1988; VL undetectable in Dec '23/CD4 182 in Jul '24; poorly compliant w/ Biktarvy and ppx), multiple episodes of b/l LE cellulitis and OM s/p L 3rd digit amputation 5 years ago, tobacco smoker (23-pack-pack year history; now 1/4 PPD ~2 months), DVT/PE (on xarelto; poorly compliant), antiphospholipid syndrome, secondary 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 Podiatry consulted to evaluate L foot wound. Pt noted he first noticed a wound on his L big toe this past summer. He sees a podiatrist outpt, a Dr. Leyden, who was debriding the wound regularly and doing dressing changes utilizing either betadine or silver sulfadiazine ointment. Pt noted he notices bloody drainage in his socks daily because of this wound. He also mentioned he had an ulcer on the same toe the summer before however it healed. No other pedal complaints at this time. Pt denies N/V/F/SOB/CP at time of consult.           ED course:  Vitals: Tmax 102.5F, HR , //85, RR 16-20, SpO2 % RA   Labs: WBC 16.37, Hgb 13.4, PLT, 207, PTT/PT/INR 38.3/23.8/2.05, D-dimer 276, Na 133, K 4.2, HCO3 25, BUN/Cr 10/1.35, ALP/AST//19/62, Tbili 0.7, Tropl I 6.7, Pro-, UA-> negative, lactate 1.3, limited RVP-> negative  EKG: sinus tachycardia, QTc 427; normal axis; no pathological q waves; non-specific ST-T wave changes  Imaging: CTPE-> No pulmonary embolism to the segmental branches. No report of consolidates, diffuse infiltrates and/or groundglass opacities. CXR-> pending report; no evidence of lobar consolidates, diffuse infiltrates, trachea midline, normal cardiac silhouette no pleural effusions. X Ray L foot-> Hallux soft tissue swelling with focal plantar ulceration. IP joint with eroded articular margins indeterminate for infection/septic versus inflammatory arthritic process  Interventions: acetaminophen 975mg PO x1, ibuprofen 800mg PO x1, zosyn 3.375g x1, vancomycin 1500mg x1, NS 2.6L  Consults: none (22 Nov 2024 02:12)      Review of systems negative except per HPI and as stated below  General:	 no weakness; no fevers, no chills  Skin/Breast: no rash  Respiratory and Thorax: no SOB, no cough  Cardiovascular:	No chest pain  Gastrointestinal:	 no nausea, vomiting , diarrhea  Genitourinary:	no dysuria, no difficulty urinating, no hematuria  Musculoskeletal:	no weakness, no joint swelling/pain  Neurological:	no focal weakness/numbness  Endocrine:	no polyuria, no polydipsia    PAST MEDICAL & SURGICAL HISTORY:  Human immunodeficiency virus (HIV) infection      Osteomyelitis      Pulmonary embolism      DVT, lower extremity        Home Medications:  Biktarvy 50 mg-200 mg-25 mg oral tablet: 1 tab(s) orally once a day (22 Nov 2024 05:23)  pantoprazole 40 mg oral delayed release tablet: 1 tab(s) orally once a day (30 Dec 2023 00:48)  Xarelto 10 mg oral tablet: 1 tab(s) orally once a day (22 Nov 2024 05:22)    Allergies    cefepime (Unknown)    Intolerances      FAMILY HISTORY:    Social History:       LABS                        11.5   8.95  )-----------( 197      ( 22 Nov 2024 05:30 )             35.0     11-22    136  |  107  |  10  ----------------------------<  133[H]  3.5   |  22  |  0.98    Ca    8.0[L]      22 Nov 2024 05:30  Phos  1.6     11-22  Mg     2.0     11-22    TPro  8.1  /  Alb  3.1[L]  /  TBili  0.7  /  DBili  x   /  AST  19  /  ALT  19  /  AlkPhos  149[H]  11-21    PT/INR - ( 22 Nov 2024 05:30 )   PT: 13.5 sec;   INR: 1.18          PTT - ( 22 Nov 2024 05:30 )  PTT:31.6 sec  ESR: 71  CRP: --  11-22 @ 10:00    Vital Signs Last 24 Hrs  T(C): 37 (22 Nov 2024 12:30), Max: 37.8 (21 Nov 2024 19:15)  T(F): 98.6 (22 Nov 2024 12:30), Max: 100.1 (21 Nov 2024 19:15)  HR: 86 (22 Nov 2024 12:30) (72 - 99)  BP: 120/76 (22 Nov 2024 12:30) (100/63 - 122/67)  BP(mean): 91 (22 Nov 2024 12:30) (83 - 91)  RR: 18 (22 Nov 2024 12:30) (16 - 20)  SpO2: 98% (22 Nov 2024 12:30) (96% - 99%)    Parameters below as of 22 Nov 2024 12:30  Patient On (Oxygen Delivery Method): room air        PHYSICAL EXAM  General: NAD, AA0x3  Lower Extremity Focused:  Vasc: 2/4 DP/PT b/l. Erythema present to anterior mid leg. Edema present to L Hallux  Derm: L hallux wound present at plantar distal medial aspect. Hyperkeratotic wound edges with small 0.2x0.2cm wound, granular base, negative for malodor, negative for purulence. Serosanguinous drainage present. +PTB. Negative for proximal tracking/tunneling of wound  Neuro: Protective sensation is absent.  MSK: -TTP of hallux wound    RADIOLOGY  XR's show cortical erosion and bony changes highly suspicious for OM of L distal hallux

## 2024-11-22 NOTE — H&P ADULT - PROBLEM SELECTOR PLAN 1
Patient meeting SIRS 4/4 w/ INR 2; lactate wnl. INR chronically elevated as well in the past w/ patient on DOACs for APLS which can falsely elevate INR; liver synthetic capacity otherwise stable on labs. Patient w/ LLE cellulitis of at least 9 days of onset that has not properly treated (see below); UA negative and limited RVP negative. S/p zosyn 3.375g x1, vancomycin 1500mg x1, NS 2.6L.    Plan:  - F/u BCx x2  - Trend coags   - Management as below

## 2024-11-23 LAB
HIV-1 VIRAL LOAD RESULT: ABNORMAL
HIV1 RNA # SERPL NAA+PROBE: ABNORMAL COPIES/ML
HIV1 RNA SER-IMP: SIGNIFICANT CHANGE UP
HIV1 RNA SERPL NAA+PROBE-ACNC: ABNORMAL
HIV1 RNA SERPL NAA+PROBE-LOG#: ABNORMAL LG COP/ML

## 2024-11-23 NOTE — CHART NOTE - NSCHARTNOTEFT_GEN_A_CORE
Patient states he feels irritated that his neighbor is making too much noise and wants to leave AMA. Offered earplugs, turned neighbor's TV sound off, but patient still wants to leave. He states 'Ill just come back tomorrow'. Explained risks of not continuing/finishing IV antibiotic treatment. Patient expresses understanding and still would like to leave. Signed AMA paperwork, IV removed.

## 2024-11-25 LAB
CULTURE RESULTS: ABNORMAL
SPECIMEN SOURCE: SIGNIFICANT CHANGE UP

## 2024-12-02 DIAGNOSIS — D68.61 ANTIPHOSPHOLIPID SYNDROME: ICD-10-CM

## 2024-12-02 DIAGNOSIS — Z88.1 ALLERGY STATUS TO OTHER ANTIBIOTIC AGENTS: ICD-10-CM

## 2024-12-02 DIAGNOSIS — L97.529 NON-PRESSURE CHRONIC ULCER OF OTHER PART OF LEFT FOOT WITH UNSPECIFIED SEVERITY: ICD-10-CM

## 2024-12-02 DIAGNOSIS — Z86.711 PERSONAL HISTORY OF PULMONARY EMBOLISM: ICD-10-CM

## 2024-12-02 DIAGNOSIS — Z79.01 LONG TERM (CURRENT) USE OF ANTICOAGULANTS: ICD-10-CM

## 2024-12-02 DIAGNOSIS — F17.210 NICOTINE DEPENDENCE, CIGARETTES, UNCOMPLICATED: ICD-10-CM

## 2024-12-02 DIAGNOSIS — B20 HUMAN IMMUNODEFICIENCY VIRUS [HIV] DISEASE: ICD-10-CM

## 2024-12-02 DIAGNOSIS — Z91.148 PATIENT'S OTHER NONCOMPLIANCE WITH MEDICATION REGIMEN FOR OTHER REASON: ICD-10-CM

## 2024-12-02 DIAGNOSIS — G62.9 POLYNEUROPATHY, UNSPECIFIED: ICD-10-CM

## 2024-12-02 DIAGNOSIS — Z89.022 ACQUIRED ABSENCE OF LEFT FINGER(S): ICD-10-CM

## 2024-12-02 DIAGNOSIS — A41.9 SEPSIS, UNSPECIFIED ORGANISM: ICD-10-CM

## 2024-12-02 DIAGNOSIS — K21.9 GASTRO-ESOPHAGEAL REFLUX DISEASE WITHOUT ESOPHAGITIS: ICD-10-CM

## 2024-12-02 DIAGNOSIS — L03.116 CELLULITIS OF LEFT LOWER LIMB: ICD-10-CM

## 2024-12-02 DIAGNOSIS — Z86.718 PERSONAL HISTORY OF OTHER VENOUS THROMBOSIS AND EMBOLISM: ICD-10-CM

## 2024-12-02 DIAGNOSIS — Z53.29 PROCEDURE AND TREATMENT NOT CARRIED OUT BECAUSE OF PATIENT'S DECISION FOR OTHER REASONS: ICD-10-CM

## 2024-12-02 DIAGNOSIS — R65.20 SEVERE SEPSIS WITHOUT SEPTIC SHOCK: ICD-10-CM

## 2025-01-21 ENCOUNTER — INPATIENT (INPATIENT)
Facility: HOSPITAL | Age: 55
LOS: 0 days | Discharge: LEFT BEFORE DISCHARGE | End: 2025-01-22
Attending: STUDENT IN AN ORGANIZED HEALTH CARE EDUCATION/TRAINING PROGRAM | Admitting: STUDENT IN AN ORGANIZED HEALTH CARE EDUCATION/TRAINING PROGRAM
Payer: MEDICARE

## 2025-01-21 VITALS
OXYGEN SATURATION: 98 % | HEART RATE: 105 BPM | RESPIRATION RATE: 15 BRPM | SYSTOLIC BLOOD PRESSURE: 117 MMHG | TEMPERATURE: 99 F | DIASTOLIC BLOOD PRESSURE: 73 MMHG

## 2025-01-21 LAB
ALBUMIN SERPL ELPH-MCNC: 3.3 G/DL — LOW (ref 3.4–5)
ALP SERPL-CCNC: 113 U/L — SIGNIFICANT CHANGE UP (ref 40–120)
ALT FLD-CCNC: <6 U/L — LOW (ref 12–42)
ANION GAP SERPL CALC-SCNC: 9 MMOL/L — SIGNIFICANT CHANGE UP (ref 9–16)
APTT BLD: 33.4 SEC — SIGNIFICANT CHANGE UP (ref 24.5–35.6)
AST SERPL-CCNC: 17 U/L — SIGNIFICANT CHANGE UP (ref 15–37)
BASOPHILS # BLD AUTO: 0.04 K/UL — SIGNIFICANT CHANGE UP (ref 0–0.2)
BASOPHILS NFR BLD AUTO: 0.2 % — SIGNIFICANT CHANGE UP (ref 0–2)
BILIRUB SERPL-MCNC: 0.7 MG/DL — SIGNIFICANT CHANGE UP (ref 0.2–1.2)
BUN SERPL-MCNC: 11 MG/DL — SIGNIFICANT CHANGE UP (ref 7–23)
CALCIUM SERPL-MCNC: 9.3 MG/DL — SIGNIFICANT CHANGE UP (ref 8.5–10.5)
CHLORIDE SERPL-SCNC: 99 MMOL/L — SIGNIFICANT CHANGE UP (ref 96–108)
CO2 SERPL-SCNC: 27 MMOL/L — SIGNIFICANT CHANGE UP (ref 22–31)
CREAT SERPL-MCNC: 1.39 MG/DL — HIGH (ref 0.5–1.3)
CRP SERPL-MCNC: 150.4 MG/L — HIGH (ref 0.5–3)
EGFR: 60 ML/MIN/1.73M2 — SIGNIFICANT CHANGE UP
EOSINOPHIL # BLD AUTO: 0 K/UL — SIGNIFICANT CHANGE UP (ref 0–0.5)
EOSINOPHIL NFR BLD AUTO: 0 % — SIGNIFICANT CHANGE UP (ref 0–6)
FLUAV AG NPH QL: SIGNIFICANT CHANGE UP
FLUBV AG NPH QL: SIGNIFICANT CHANGE UP
GLUCOSE SERPL-MCNC: 112 MG/DL — HIGH (ref 70–99)
HCT VFR BLD CALC: 38.8 % — LOW (ref 39–50)
HGB BLD-MCNC: 12.6 G/DL — LOW (ref 13–17)
IMM GRANULOCYTES NFR BLD AUTO: 0.8 % — SIGNIFICANT CHANGE UP (ref 0–0.9)
INR BLD: 1.52 — HIGH (ref 0.85–1.16)
LACTATE BLDV-MCNC: 1.6 MMOL/L — SIGNIFICANT CHANGE UP (ref 0.5–2)
LYMPHOCYTES # BLD AUTO: 1.59 K/UL — SIGNIFICANT CHANGE UP (ref 1–3.3)
LYMPHOCYTES # BLD AUTO: 9.4 % — LOW (ref 13–44)
MCHC RBC-ENTMCNC: 28.3 PG — SIGNIFICANT CHANGE UP (ref 27–34)
MCHC RBC-ENTMCNC: 32.5 G/DL — SIGNIFICANT CHANGE UP (ref 32–36)
MCV RBC AUTO: 87 FL — SIGNIFICANT CHANGE UP (ref 80–100)
MONOCYTES # BLD AUTO: 0.71 K/UL — SIGNIFICANT CHANGE UP (ref 0–0.9)
MONOCYTES NFR BLD AUTO: 4.2 % — SIGNIFICANT CHANGE UP (ref 2–14)
NEUTROPHILS # BLD AUTO: 14.44 K/UL — HIGH (ref 1.8–7.4)
NEUTROPHILS NFR BLD AUTO: 85.4 % — HIGH (ref 43–77)
NRBC # BLD: 0 /100 WBCS — SIGNIFICANT CHANGE UP (ref 0–0)
NRBC BLD-RTO: 0 /100 WBCS — SIGNIFICANT CHANGE UP (ref 0–0)
PLATELET # BLD AUTO: 222 K/UL — SIGNIFICANT CHANGE UP (ref 150–400)
POTASSIUM SERPL-MCNC: 3.1 MMOL/L — LOW (ref 3.5–5.3)
POTASSIUM SERPL-SCNC: 3.1 MMOL/L — LOW (ref 3.5–5.3)
PROT SERPL-MCNC: 8.3 G/DL — HIGH (ref 6.4–8.2)
PROTHROM AB SERPL-ACNC: 17.7 SEC — HIGH (ref 9.9–13.4)
RBC # BLD: 4.46 M/UL — SIGNIFICANT CHANGE UP (ref 4.2–5.8)
RBC # FLD: 14.2 % — SIGNIFICANT CHANGE UP (ref 10.3–14.5)
RSV RNA NPH QL NAA+NON-PROBE: SIGNIFICANT CHANGE UP
SARS-COV-2 RNA SPEC QL NAA+PROBE: SIGNIFICANT CHANGE UP
SODIUM SERPL-SCNC: 135 MMOL/L — SIGNIFICANT CHANGE UP (ref 132–145)
TROPONIN I, HIGH SENSITIVITY RESULT: 8.3 NG/L — SIGNIFICANT CHANGE UP
WBC # BLD: 16.91 K/UL — HIGH (ref 3.8–10.5)
WBC # FLD AUTO: 16.91 K/UL — HIGH (ref 3.8–10.5)

## 2025-01-21 PROCEDURE — 93971 EXTREMITY STUDY: CPT | Mod: 26,LT

## 2025-01-21 PROCEDURE — 71275 CT ANGIOGRAPHY CHEST: CPT | Mod: 26

## 2025-01-21 PROCEDURE — 73700 CT LOWER EXTREMITY W/O DYE: CPT | Mod: 26,LT

## 2025-01-21 PROCEDURE — 99285 EMERGENCY DEPT VISIT HI MDM: CPT

## 2025-01-21 RX ORDER — BACTERIOSTATIC SODIUM CHLORIDE 0.9 %
2500 VIAL (ML) INJECTION ONCE
Refills: 0 | Status: COMPLETED | OUTPATIENT
Start: 2025-01-21 | End: 2025-01-21

## 2025-01-21 RX ORDER — PIPERACILLIN SODIUM AND TAZOBACTAM SODIUM 2; 250 G/50ML; MG/50ML
3.38 INJECTION, POWDER, FOR SOLUTION INTRAVENOUS ONCE
Refills: 0 | Status: COMPLETED | OUTPATIENT
Start: 2025-01-21 | End: 2025-01-21

## 2025-01-21 RX ORDER — VANCOMYCIN HYDROCHLORIDE 50 MG/ML
1000 KIT ORAL ONCE
Refills: 0 | Status: COMPLETED | OUTPATIENT
Start: 2025-01-21 | End: 2025-01-21

## 2025-01-21 RX ORDER — BACTERIOSTATIC SODIUM CHLORIDE 0.9 %
1000 VIAL (ML) INJECTION ONCE
Refills: 0 | Status: COMPLETED | OUTPATIENT
Start: 2025-01-21 | End: 2025-01-21

## 2025-01-21 RX ORDER — ACETAMINOPHEN 160 MG/5ML
1000 SUSPENSION ORAL ONCE
Refills: 0 | Status: COMPLETED | OUTPATIENT
Start: 2025-01-21 | End: 2025-01-21

## 2025-01-21 RX ORDER — POTASSIUM CHLORIDE 750 MG/1
10 TABLET, EXTENDED RELEASE ORAL ONCE
Refills: 0 | Status: COMPLETED | OUTPATIENT
Start: 2025-01-21 | End: 2025-01-21

## 2025-01-21 RX ADMIN — VANCOMYCIN HYDROCHLORIDE 250 MILLIGRAM(S): KIT at 21:08

## 2025-01-21 RX ADMIN — Medication 500 MILLILITER(S): at 21:08

## 2025-01-21 RX ADMIN — Medication 2500 MILLILITER(S): at 19:27

## 2025-01-21 RX ADMIN — POTASSIUM CHLORIDE 100 MILLIEQUIVALENT(S): 750 TABLET, EXTENDED RELEASE ORAL at 21:08

## 2025-01-21 RX ADMIN — PIPERACILLIN SODIUM AND TAZOBACTAM SODIUM 200 GRAM(S): 2; 250 INJECTION, POWDER, FOR SOLUTION INTRAVENOUS at 20:45

## 2025-01-21 RX ADMIN — ACETAMINOPHEN 400 MILLIGRAM(S): 160 SUSPENSION ORAL at 19:32

## 2025-01-21 NOTE — ED PROVIDER NOTE - CLINICAL SUMMARY MEDICAL DECISION MAKING FREE TEXT BOX
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  Patient afebrile at triage and T 101F rectal.  .     Vanco and Zosyn ordered.  Pt with cefipime allergy in past but received Zosyn in November hospital admission.  Lactate normal, WBC 16K.  CT leg for cellulitis and CTA chest ordered.  Patient states he has not missed any doses of his anticoagulant or other meds in recent weeks but he has been nonadherent in the past. 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  Patient afebrile at triage and T 101F rectal.  .     Vanco and Zosyn ordered.  Pt with cefipime allergy in past but received Zosyn in November hospital admission.  Lactate normal, WBC 16K.  CT leg for cellulitis and CTA chest ordered.  Patient states he has not missed any doses of his anticoagulant or other meds in recent weeks but he has been nonadherent in the past.    8:12pm case d/w Dr KIMBERLY Hernandez attending hospitalist and we will perform the CT scan prior to admission through the CTC

## 2025-01-21 NOTE — ED ADULT NURSE NOTE - CHIEF COMPLAINT QUOTE
Pt BIBA from home; pt reports SOB, cellulitis to left leg and "hole in toe" that all started this AM. No fevers, no CP, no cough. No recent abx usage. Per EMS pt was at [facility] earlier for the same and signed out AMA.

## 2025-01-21 NOTE — ED ADULT TRIAGE NOTE - CHIEF COMPLAINT QUOTE
Pt BIBA from home; pt reports SOB, cellulitis to left leg and "hole in toe" that all started this AM. No fevers, no CP, no cough. No recent abx usage. Per EMS pt was at [facility] earlier for the same and signed out AMA.

## 2025-01-21 NOTE — ED PROVIDER NOTE - CARE PLAN
1 Principal Discharge DX:	Cellulitis of left lower limb  Secondary Diagnosis:	H/O AIDS  Secondary Diagnosis:	Hypercoagulable state

## 2025-01-21 NOTE — ED PROVIDER NOTE - OBJECTIVE STATEMENT
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  Patient afebrile at triage and T 101F rectal.  .

## 2025-01-21 NOTE — ED PROVIDER NOTE - CPE EDP CARDIAC NORM
PSA PART II ADDITIONAL INFORMATION        Access To Fire Arms: No    Substance Use: YES    Last Use: Prior to admission, pt unable to identify     Type of Substance: Alcohol use    Frequency of Use: three to four times a week     Request to See : YES    If yes, notified : YES    Release of Information Signed: NO    Release of Information Signed For: Pt gave verbal consent to speak with his father. - - -

## 2025-01-21 NOTE — ED ADULT NURSE NOTE - NSFALLUNIVINTERV_ED_ALL_ED
Bed/Stretcher in lowest position, wheels locked, appropriate side rails in place/Call bell, personal items and telephone in reach/Instruct patient to call for assistance before getting out of bed/chair/stretcher/Non-slip footwear applied when patient is off stretcher/[place] to call system/Physically safe environment - no spills, clutter or unnecessary equipment/Purposeful proactive rounding/Room/bathroom lighting operational, light cord in reach

## 2025-01-22 VITALS
DIASTOLIC BLOOD PRESSURE: 71 MMHG | SYSTOLIC BLOOD PRESSURE: 120 MMHG | TEMPERATURE: 98 F | OXYGEN SATURATION: 95 % | HEART RATE: 78 BPM | RESPIRATION RATE: 17 BRPM

## 2025-01-22 DIAGNOSIS — A41.9 SEPSIS, UNSPECIFIED ORGANISM: ICD-10-CM

## 2025-01-22 DIAGNOSIS — D68.61 ANTIPHOSPHOLIPID SYNDROME: ICD-10-CM

## 2025-01-22 DIAGNOSIS — B20 HUMAN IMMUNODEFICIENCY VIRUS [HIV] DISEASE: ICD-10-CM

## 2025-01-22 DIAGNOSIS — Z29.9 ENCOUNTER FOR PROPHYLACTIC MEASURES, UNSPECIFIED: ICD-10-CM

## 2025-01-22 DIAGNOSIS — L03.116 CELLULITIS OF LEFT LOWER LIMB: ICD-10-CM

## 2025-01-22 DIAGNOSIS — L97.529 NON-PRESSURE CHRONIC ULCER OF OTHER PART OF LEFT FOOT WITH UNSPECIFIED SEVERITY: ICD-10-CM

## 2025-01-22 LAB — ADD ON TEST-SPECIMEN IN LAB: SIGNIFICANT CHANGE UP

## 2025-01-22 PROCEDURE — 73630 X-RAY EXAM OF FOOT: CPT

## 2025-01-22 PROCEDURE — 84484 ASSAY OF TROPONIN QUANT: CPT

## 2025-01-22 PROCEDURE — 99285 EMERGENCY DEPT VISIT HI MDM: CPT | Mod: 25

## 2025-01-22 PROCEDURE — 85730 THROMBOPLASTIN TIME PARTIAL: CPT

## 2025-01-22 PROCEDURE — 73700 CT LOWER EXTREMITY W/O DYE: CPT | Mod: MC

## 2025-01-22 PROCEDURE — 83605 ASSAY OF LACTIC ACID: CPT

## 2025-01-22 PROCEDURE — 71275 CT ANGIOGRAPHY CHEST: CPT | Mod: MC

## 2025-01-22 PROCEDURE — 99223 1ST HOSP IP/OBS HIGH 75: CPT | Mod: GC

## 2025-01-22 PROCEDURE — 96374 THER/PROPH/DIAG INJ IV PUSH: CPT

## 2025-01-22 PROCEDURE — 93010 ELECTROCARDIOGRAM REPORT: CPT

## 2025-01-22 PROCEDURE — 87637 SARSCOV2&INF A&B&RSV AMP PRB: CPT

## 2025-01-22 PROCEDURE — 93005 ELECTROCARDIOGRAM TRACING: CPT

## 2025-01-22 PROCEDURE — 85025 COMPLETE CBC W/AUTO DIFF WBC: CPT

## 2025-01-22 PROCEDURE — 87186 SC STD MICRODIL/AGAR DIL: CPT

## 2025-01-22 PROCEDURE — 93971 EXTREMITY STUDY: CPT

## 2025-01-22 PROCEDURE — 87070 CULTURE OTHR SPECIMN AEROBIC: CPT

## 2025-01-22 PROCEDURE — 96375 TX/PRO/DX INJ NEW DRUG ADDON: CPT

## 2025-01-22 PROCEDURE — 36415 COLL VENOUS BLD VENIPUNCTURE: CPT

## 2025-01-22 PROCEDURE — 86140 C-REACTIVE PROTEIN: CPT

## 2025-01-22 PROCEDURE — 80053 COMPREHEN METABOLIC PANEL: CPT

## 2025-01-22 PROCEDURE — 85610 PROTHROMBIN TIME: CPT

## 2025-01-22 PROCEDURE — 87040 BLOOD CULTURE FOR BACTERIA: CPT

## 2025-01-22 PROCEDURE — 73630 X-RAY EXAM OF FOOT: CPT | Mod: 26,LT

## 2025-01-22 PROCEDURE — 87077 CULTURE AEROBIC IDENTIFY: CPT

## 2025-01-22 RX ORDER — MAGNESIUM, ALUMINUM HYDROXIDE 200-225/5
30 SUSPENSION, ORAL (FINAL DOSE FORM) ORAL EVERY 4 HOURS
Refills: 0 | Status: DISCONTINUED | OUTPATIENT
Start: 2025-01-22 | End: 2025-01-22

## 2025-01-22 RX ORDER — RIVAROXABAN 20 MG/1
10 TABLET, FILM COATED ORAL
Refills: 0 | Status: DISCONTINUED | OUTPATIENT
Start: 2025-01-22 | End: 2025-01-22

## 2025-01-22 RX ORDER — VANCOMYCIN HYDROCHLORIDE 50 MG/ML
1000 KIT ORAL ONCE
Refills: 0 | Status: COMPLETED | OUTPATIENT
Start: 2025-01-22 | End: 2025-01-22

## 2025-01-22 RX ORDER — ACETAMINOPHEN 160 MG/5ML
650 SUSPENSION ORAL EVERY 6 HOURS
Refills: 0 | Status: DISCONTINUED | OUTPATIENT
Start: 2025-01-22 | End: 2025-01-22

## 2025-01-22 RX ORDER — PIPERACILLIN SODIUM AND TAZOBACTAM SODIUM 2; 250 G/50ML; MG/50ML
3.38 INJECTION, POWDER, FOR SOLUTION INTRAVENOUS EVERY 8 HOURS
Refills: 0 | Status: DISCONTINUED | OUTPATIENT
Start: 2025-01-22 | End: 2025-01-22

## 2025-01-22 RX ORDER — VANCOMYCIN HYDROCHLORIDE 50 MG/ML
1000 KIT ORAL EVERY 12 HOURS
Refills: 0 | Status: DISCONTINUED | OUTPATIENT
Start: 2025-01-22 | End: 2025-01-22

## 2025-01-22 RX ORDER — VANCOMYCIN HYDROCHLORIDE 50 MG/ML
1250 KIT ORAL ONCE
Refills: 0 | Status: COMPLETED | OUTPATIENT
Start: 2025-01-22 | End: 2025-01-22

## 2025-01-22 RX ORDER — VANCOMYCIN HYDROCHLORIDE 50 MG/ML
1250 KIT ORAL EVERY 12 HOURS
Refills: 0 | Status: DISCONTINUED | OUTPATIENT
Start: 2025-01-22 | End: 2025-01-22

## 2025-01-22 RX ORDER — PIPERACILLIN SODIUM AND TAZOBACTAM SODIUM 2; 250 G/50ML; MG/50ML
3.38 INJECTION, POWDER, FOR SOLUTION INTRAVENOUS ONCE
Refills: 0 | Status: COMPLETED | OUTPATIENT
Start: 2025-01-22 | End: 2025-01-22

## 2025-01-22 RX ORDER — ONDANSETRON 4 MG/1
4 TABLET, ORALLY DISINTEGRATING ORAL EVERY 8 HOURS
Refills: 0 | Status: DISCONTINUED | OUTPATIENT
Start: 2025-01-22 | End: 2025-01-22

## 2025-01-22 RX ORDER — BICTEGRAVIR SODIUM, EMTRICITABINE, AND TENOFOVIR ALAFENAMIDE FUMARATE 50; 200; 25 MG/1; MG/1; MG/1
1 TABLET ORAL EVERY 24 HOURS
Refills: 0 | Status: DISCONTINUED | OUTPATIENT
Start: 2025-01-22 | End: 2025-01-22

## 2025-01-22 RX ORDER — PANTOPRAZOLE 20 MG/1
40 TABLET, DELAYED RELEASE ORAL
Refills: 0 | Status: DISCONTINUED | OUTPATIENT
Start: 2025-01-22 | End: 2025-01-22

## 2025-01-22 RX ORDER — SULFAMETHOXAZOLE AND TRIMETHOPRIM 400; 80 MG/1; MG/1
1 TABLET ORAL DAILY
Refills: 0 | Status: DISCONTINUED | OUTPATIENT
Start: 2025-01-22 | End: 2025-01-22

## 2025-01-22 RX ORDER — NICOTINE POLACRILEX 4 MG/1
1 LOZENGE ORAL DAILY
Refills: 0 | Status: DISCONTINUED | OUTPATIENT
Start: 2025-01-22 | End: 2025-01-22

## 2025-01-22 RX ORDER — NICOTINE POLACRILEX 4 MG/1
1 LOZENGE ORAL ONCE
Refills: 0 | Status: COMPLETED | OUTPATIENT
Start: 2025-01-22 | End: 2025-01-22

## 2025-01-22 RX ORDER — ACETAMINOPHEN, DIPHENHYDRAMINE HCL, PHENYLEPHRINE HCL 325; 25; 5 MG/1; MG/1; MG/1
3 TABLET ORAL AT BEDTIME
Refills: 0 | Status: DISCONTINUED | OUTPATIENT
Start: 2025-01-22 | End: 2025-01-22

## 2025-01-22 RX ADMIN — PIPERACILLIN SODIUM AND TAZOBACTAM SODIUM 200 GRAM(S): 2; 250 INJECTION, POWDER, FOR SOLUTION INTRAVENOUS at 05:40

## 2025-01-22 RX ADMIN — VANCOMYCIN HYDROCHLORIDE 166.67 MILLIGRAM(S): KIT at 19:19

## 2025-01-22 RX ADMIN — SULFAMETHOXAZOLE AND TRIMETHOPRIM 1 TABLET(S): 400; 80 TABLET ORAL at 11:41

## 2025-01-22 RX ADMIN — PIPERACILLIN SODIUM AND TAZOBACTAM SODIUM 25 GRAM(S): 2; 250 INJECTION, POWDER, FOR SOLUTION INTRAVENOUS at 14:12

## 2025-01-22 RX ADMIN — VANCOMYCIN HYDROCHLORIDE 250 MILLIGRAM(S): KIT at 07:35

## 2025-01-22 RX ADMIN — RIVAROXABAN 10 MILLIGRAM(S): 20 TABLET, FILM COATED ORAL at 17:16

## 2025-01-22 RX ADMIN — NICOTINE POLACRILEX 1 PATCH: 4 LOZENGE ORAL at 09:47

## 2025-01-22 RX ADMIN — PIPERACILLIN SODIUM AND TAZOBACTAM SODIUM 25 GRAM(S): 2; 250 INJECTION, POWDER, FOR SOLUTION INTRAVENOUS at 12:20

## 2025-01-22 RX ADMIN — NICOTINE POLACRILEX 1 PATCH: 4 LOZENGE ORAL at 18:29

## 2025-01-22 RX ADMIN — BICTEGRAVIR SODIUM, EMTRICITABINE, AND TENOFOVIR ALAFENAMIDE FUMARATE 1 TABLET(S): 50; 200; 25 TABLET ORAL at 13:21

## 2025-01-22 NOTE — H&P ADULT - PROBLEM SELECTOR PLAN 2
Patient w/ evidence of cellulitis on L anterior leg which has been present at least since 10/13/24 presenting to ED twice for similar complaint but refusing admissions and preferring PO antibiotics however had not been taking them 2/2 recent depressive episode after reportedly not receiving adequate denture implantation. Patient has had an extensive history of cellulitis in the past w/ reported L LE 3rd digit OM s/p amputation and previous admission to [facility] in 2023 cellulitis in same site however patient AMA'd at that time. Although physical exam does not reveal purulence or fluctuance of affected site suggesting underlying abscess given extensive history of IV abx will cover for MRSA. No open ulcers on affected site and patient w/o history of diabetes for which Pseudomonas coverage not required.     STABLE.     Plan:  - C/w vancomycin 1250mg q12; trough before 4th dose on 11/23/24 @ 5 am  - F/u ESR/CRP  - F/u BCX x2  - Podiatry consult  - Management as above Patient w/ evidence of cellulitis on L anterior leg which has been present at least since 11/22/24 presenting to ED 3x for similar complaint but refusing admissions and preferring PO antibiotics however had not been taking them 2/2 recent depressive episode after reportedly not receiving adequate denture implantation. Patient has had an extensive history of cellulitis in the past w/ reported L LE 3rd digit OM s/p amputation and previous admission to [facility] in 2023 cellulitis in same site however patient AMA'd at that time. Physical exam shows flutuance over foot and going up leg suggesting underlying abscess and spread given extensive history of IV abx will cover for MRSA. Small opening below 1st big toe on affected site and patient w/o history of diabetes for which Pseudomonas coverage not required.       Plan:  -MRI to r/o osteomyelitis (ESR and CRP elevated)   - C/w vancomycin 1000mg q12 and zosyn   - F/u BCX x2  - Podiatry consult  - Management as above Patient w/ evidence of cellulitis on L anterior leg which has been present at least since 11/22/24 presenting to ED 3x for similar complaint but refusing admissions and preferring PO antibiotics however had not been taking them 2/2 recent depressive episode after reportedly not receiving adequate denture implantation. Patient has had an extensive history of cellulitis in the past w/ reported L LE 3rd digit OM s/p amputation and previous admission to [facility] in 2023 cellulitis in same site however patient AMA'd at that time. Physical exam shows flutuance over foot and going up leg suggesting underlying abscess and spread given extensive history of IV abx will cover for MRSA. Small opening below 1st big toe on affected site and patient w/o history of diabetes for which Pseudomonas coverage not required.

## 2025-01-22 NOTE — H&P ADULT - NSHPLABSRESULTS_GEN_ALL_CORE
.  LABS:                         12.6   16.91 )-----------( 222      ( 21 Jan 2025 18:49 )             38.8     01-21    135  |  99  |  11  ----------------------------<  112[H]  3.1[L]   |  27  |  1.39[H]    Ca    9.3      21 Jan 2025 18:49    TPro  8.3[H]  /  Alb  3.3[L]  /  TBili  0.7  /  DBili  x   /  AST  17  /  ALT  <6[L]  /  AlkPhos  113  01-21    PT/INR - ( 21 Jan 2025 18:49 )   PT: 17.7 sec;   INR: 1.52          PTT - ( 21 Jan 2025 18:49 )  PTT:33.4 sec  Urinalysis Basic - ( 21 Jan 2025 18:49 )    Color: x / Appearance: x / SG: x / pH: x  Gluc: 112 mg/dL / Ketone: x  / Bili: x / Urobili: x   Blood: x / Protein: x / Nitrite: x   Leuk Esterase: x / RBC: x / WBC x   Sq Epi: x / Non Sq Epi: x / Bacteria: x                RADIOLOGY, EKG & ADDITIONAL TESTS: Reviewed.

## 2025-01-22 NOTE — H&P ADULT - PROBLEM SELECTOR PLAN 5
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 Patient is now prescribed xarelto 10mg PO daily however not compliant w/ doses taken last 3 days however did not take for ~2 weeks prior because of depressive symptoms. Patient had c/f PE given dyspnea however CTPE negative w/o elevated markers of heart strain w/ d-dimer <300. DVT r/o w/ US on 10/13/24.   On Xarelto instead of warfarin likley d/t compliance concerns, as per records of noncompliant history. INR 2.05 on admission, improved 11/22, no signs or concern for acute bleeds/clots. HDS.     Plan:  - C/w xarelto 10mg PO daily  - Maintain active T&S Patient diagnosed in 2023 after he had previous DVT/PE in Jan and May '23. Patient is now prescribed xarelto 10mg PO daily however only compliant as of 1.5 months ago because of depressive symptoms.     Plan:  - C/w xarelto 10mg PO daily  - Maintain active T&S

## 2025-01-22 NOTE — H&P ADULT - NSHPPHYSICALEXAM_GEN_ALL_CORE
T(C): 36.5 (01-22-25 @ 09:50), Max: 38.5 (01-21-25 @ 19:36)  HR: 89 (01-22-25 @ 09:50) (77 - 105)  BP: 125/78 (01-22-25 @ 09:50) (108/55 - 125/78)  RR: 18 (01-22-25 @ 09:50) (15 - 18)  SpO2: 96% (01-22-25 @ 09:50) (96% - 99%)    CONSTITUTIONAL: Well groomed, no apparent distress  EYES: PERRLA and symmetric, EOMI, No conjunctival or scleral injection, non-icteric  ENMT: Oral mucosa with moist membranes. Normal dentition; no pharyngeal injection or exudates             NECK: Supple, symmetric and without tracheal deviation   RESP: No respiratory distress, no use of accessory muscles; CTA b/l, no WRR  CV: RRR, +S1S2, no MRG; no JVD; no peripheral edema  GI: Soft, NT, ND, no rebound, no guarding; no palpable masses; no hepatosplenomegaly; no hernia palpated  LYMPH: No cervical LAD or tenderness; no axillary LAD or tenderness; no inguinal LAD or tenderness  MSK: Normal gait; No digital clubbing or cyanosis; examination of the (head/neck/spine/ribs/pelvis, RUE, LUE, RLE, LLE) without misalignment,            Normal ROM without pain, no spinal tenderness, normal muscle strength/tone  SKIN: No rashes or ulcers noted; no subcutaneous nodules or induration palpable  NEURO: CN II-XII intact; normal reflexes in upper and lower extremities, sensation intact in upper and lower extremities b/l to light touch   PSYCH: Appropriate insight/judgment; A+O x 3, mood and affect appropriate, recent/remote memory intact T(C): 36.5 (01-22-25 @ 09:50), Max: 38.5 (01-21-25 @ 19:36)  HR: 89 (01-22-25 @ 09:50) (77 - 105)  BP: 125/78 (01-22-25 @ 09:50) (108/55 - 125/78)  RR: 18 (01-22-25 @ 09:50) (15 - 18)  SpO2: 96% (01-22-25 @ 09:50) (96% - 99%)    CONSTITUTIONAL: Well groomed, no apparent distress  EYES: PERRLA and symmetric, EOMI, No conjunctival or scleral injection, non-icteric  ENMT: Oral mucosa with moist membranes. no teeth   RESP: No respiratory distress, no use of accessory muscles; CTA b/l, no WRR  CV: RRR, +S1S2, no MRG; no JVD; no peripheral edema  GI: Soft, NT, ND, no rebound, no guarding; no palpable masses; no hepatosplenomegaly; no hernia palpated  MSK: +redness and stasis changes over LLE , amputated 3rd toe with fluctuance on sole of left foot. increase flutuance over right knee and thigh   NEURO: CN II-XII intact; normal reflexes in upper and lower extremities, sensation intact in upper and lower extremities b/l to light touch   PSYCH: Appropriate insight/judgment; A+O x 3, mood and affect appropriate, recent/remote memory intact

## 2025-01-22 NOTE — CONSULT NOTE ADULT - ASSESSMENT
53 y/o M w/ PMHx of HIV/AIDS (poorly compliant w/ Biktarvy and bactrim), multiple episodes of b/l LE cellulitis and OM, tobacco smoker, DVT/PE (on xarelto; poorly compliant), antiphospholipid syndrome, secondary syphilis treated 1 year ago, peripheral neuropathy, GERD, and depression that presents to the ED w/ complaints of dyspnea and found to have sepsis 2/2 LLE cellulitis. Will admit to [facility] for IV antibiotics.  Podiatry consulted for L hallux wound. At the time of consult, VSS, WBC elevated to 16.91, DVT ruled out. X-rays reviewed. Concern for OM vs SSTI clinically    Plan:  -c/w broad spectrum IV abx  -Recc MRI L foot to r/o OM  -L foot hallux wound excisional debridement done down to subcutaneous level using sterile #15 blade  -Deep wound Cx taken, will f/u on that  -Foot dressed with Betadine DSD  -WBAT  -Rest of care upto primary team    Podiatry to follow, plan d/w attending

## 2025-01-22 NOTE — PATIENT PROFILE ADULT - NSTOBACCOCESSATIONEDU2_GEN_A_NUR
Develop coping skills.  For example, strategies and lifestyle changes that reduce negative moods, stress, and exposure to smoking cues. unable to assess

## 2025-01-22 NOTE — H&P ADULT - PROBLEM SELECTOR PLAN 1
Patient meeting SIRS 4/4 w/ INR 2; lactate wnl. INR chronically elevated as well in the past w/ patient on DOACs for APLS which can falsely elevate INR; liver synthetic capacity otherwise stable on labs. Patient w/ LLE cellulitis of at least 9 days of onset that has not properly treated (see below); UA negative and limited RVP negative. S/p zosyn 3.375g x1, vancomycin 1500mg x1, NS 2.6L.  WBC count improved to 8.95 today.     Plan:  - F/u BCx x2  - Trend coags   - Management as below Patient meeting SIRS 2/4 (WBC 16k, HR>90); lactate wnl. Limited RVP negative. S/p zosyn 3.375g x1, vancomycin 1500mg x1, NS 2.5L.    Plan:  - F/u BCx x2  - c/w vancomycin and zosyn   - Management as below Patient meeting severe SIRS 2/4 (WBC 16k, HR>90, INR 1.52); lactate wnl. Limited RVP negative. S/p zosyn 3.375g x1, vancomycin 1500mg x1, NS 2.5L.    Plan:  - F/u BCx x2  - c/w vancomycin and zosyn   - Management as below

## 2025-01-22 NOTE — H&P ADULT - TIME BILLING

## 2025-01-22 NOTE — H&P ADULT - HISTORY OF PRESENT ILLNESS
ED course:  Vitals:   Labs:   EKG:   Imaging:   Interventions:   Consults:  55 y/o pmedhx DVT/PE (on xarelto; poorly compliant), HIV/AIDS, b/l LE cellulitis and OM s/p L 3rd digit amputation 5 yrs ago, tobacco smoker (23pack yrs), antiphospholipid syndrome, secondary syphilis treated 1 year ago, peripheral neuropathy, GERD, and depression.  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  Patient afebrile at triage and T 101F rectal.        ED course  Vitals: 99.1F (101F rectal), , 117/73, RR18 98% O2  RA   Labs: WBC 16k, hgb 12.6, Plt 222, ESR 71, , K+ 3.1, CR 1.39, Lactate 1.6 , neg RVP   EKG:   Imaging: CT Angio neg for PE (dilated main PA)   CT LLE shows no gas, ulcers on medial volar L 1st toe, 2.4 cm subq collection at volar aspect of 1st metatarsal; diffuse cellulitis of LLE   No LLE DVT seen on ultrasound   Interventions: Wound culture + bacteria   Consults:  55 y/o pmedhx DVT/PE (on xarelto; poorly compliant), HIV/AIDS, b/l LE cellulitis and OM s/p L 3rd digit amputation 5 yrs ago, tobacco smoker (23pack yrs), antiphospholipid syndrome, secondary syphilis treated 1 year ago, peripheral neuropathy, GERD, and depression.  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  Patient afebrile at triage and T 101F rectal.        ED course  Vitals: 99.1F (101F rectal), , 117/73, RR18 98% O2  RA   Labs: WBC 16k, hgb 12.6, Plt 222, ESR 71, , K+ 3.1, CR 1.39, Lactate 1.6 , neg RVP   EKG:   Imaging: CT Angio neg for PE (dilated main PA)   CT LLE shows no gas, ulcers on medial volar L 1st toe, 2.4 cm subq collection at volar aspect of 1st metatarsal; diffuse cellulitis of LLE   No LLE DVT seen on ultrasound   Interventions: 3.5L NS, vanc x2, zosyn 3.375 x2, nicotine patch, ofirmevx1,   Wound culture showed + bacteria   Consults:  55 y/o pmedhx DVT/PE (on xarelto; poorly compliant), HIV/AIDS, b/l LE cellulitis and OM s/p L 3rd digit amputation 5 yrs ago, tobacco smoker (23pack yrs), antiphospholipid syndrome, secondary syphilis treated 1 year ago, peripheral neuropathy, GERD, and depression.  CD4 count 127 in 11/2024   Previously admitted in 11/22-11/23/2024 for same concern of LLE cellulitis r/o OM and AMA'd due to his neighbor making too much noise and never finished course of abx.     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  Patient afebrile at triage and T 101F rectal.        ED course  Vitals: 99.1F (101F rectal), , 117/73, RR18 98% O2  RA   Labs: WBC 16k, hgb 12.6, Plt 222, ESR 71, , K+ 3.1, CR 1.39, Lactate 1.6 , neg RVP   EKG:   Imaging: CT Angio neg for PE (dilated main PA)   CT LLE shows no gas, ulcers on medial volar L 1st toe, 2.4 cm subq collection at volar aspect of 1st metatarsal; diffuse cellulitis of LLE   No LLE DVT seen on ultrasound   Interventions: 3.5L NS, vanc x2, zosyn 3.375 x2, nicotine patch, ofirmevx1,   Wound culture showed + bacteria   Consults:  53 y/o pmedhx DVT/PE (on xarelto; poorly compliant), HIV/AIDS (CD4 count 127 in 11/2024; VL undetectable in Dec '23/CD4 182 in Jul '24; poorly compliant w/ Biktarvy and ppx)), b/l LE cellulitis and OM s/p L 3rd digit amputation 5 yrs ago, tobacco smoker (23pack yrs), antiphospholipid syndrome, secondary syphilis treated 1 year ago, peripheral neuropathy, GERD, and depression presented with SOB and worsening of known LLE cellulitis. Previously admitted in 11/22-11/23/2024 for same concern of LLE cellulitis r/o OM and AMA'd due to his neighbor making too much noise and never finished course of abx.  2 previous visits to [facility] ED on the 10/13-10/16/24 where he presented for LLE significant for cellulitis of L anterior leg. In both occasions patient was recommended for admission but AMA'd in both instances on PO antibiotics which he reports he did not fill; he isn't clear on onset.     Per pt[facility] he was going to the [facility] to have dental work done, but he started having SOB and weakness at the airport. He was then brought to [facility] where he AMA'd due to not liking the hospital and went hme to grab a bite to eat. Then his friend came over and advised him to go to the [facility] for his leg, so he went to the hospital and was trasnferred to [facility]. He feels that his left leg cellulitis has been getting worse and noticed last night that it was becoming "redder" with more pain but he originally didn't see it becoming red because his appartement lights are blue. At this time, he says that he would like to receive a few rounds of abx but that he needs to leave to go to [facility] as his dental work is already prepaid for. Compliant with biktarvy and xarelto as of 1.5 months ago, unsure about compliance before but pt says that depression gets in the way of him taking his meds.     Sees podiatrist outpt, a Dr. Leyden, who was debriding the wound regularly and doing dressing changes utilizing either betadine or silver sulfadiazine ointment.      Denies EtOH, substance use. Reports only marijauna and tobacco use (few cigarettes per day). Denies IV drug use in the past or present.   Pt is on disability since 2004 due to HIV status ? and depression. 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 Was previously a  before disability.         ED course  Vitals: 99.1F (101F rectal), , 117/73, RR18 98% O2  RA   Labs: WBC 16k, hgb 12.6, Plt 222, ESR 71, , K+ 3.1, CR 1.39, Lactate 1.6 , neg RVP   EKG:   Imaging: CT Angio neg for PE (dilated main PA)   CT LLE shows no gas, ulcers on medial volar L 1st toe, 2.4 cm subq collection at volar aspect of 1st metatarsal; diffuse cellulitis of LLE   No LLE DVT seen on ultrasound   Interventions: 3.5L NS, vanc x2, zosyn 3.375 x2, nicotine patch, ofirmevx1,   Wound culture showed + bacteria   Consults: Podiatry

## 2025-01-22 NOTE — PATIENT PROFILE ADULT - FUNCTIONAL ASSESSMENT - BASIC MOBILITY 6.
3-calculated by average/Not able to assess (calculate score using [facility] averaging method)

## 2025-01-22 NOTE — CONSULT NOTE ADULT - SUBJECTIVE AND OBJECTIVE BOX
Attending: Dr. 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 Previously admitted in 11/22-11/23/2024 for same concern of LLE cellulitis r/o OM and AMA'd due to his neighbor making too much noise and never finished course of abx.  2 previous visits to [facility] ED on the 10/13-10/16/24 where he presented for LLE significant for cellulitis of L anterior leg. In both occasions patient was recommended for admission but AMA'd in both instances on PO antibiotics which he reports he did not fill; he isn't clear on onset.     Per pt, [facility] as he was going to the [facility] to have dental work done, but he started having SOB and weakness at the airport. He was then brought to [facility] where he AMA'd due to not liking the hospital and went hme to grab a bite to eat. Then his friend came over and advised him to go to the [facility] for his leg, so he went to the hospital and was trasnferred to [facility]. He feels that his left leg cellulitis has been getting worse and noticed last night that it was becoming "redder" with more pain but he originally didn't see it becoming red because his appartement lights are blue. At this time, he says that he would like to receive a few rounds of abx but that he needs to leave to go to [facility] as his dental work is already prepaid for. Compliant with biktarvy and xarelto as of 1.5 months ago, unsure about compliance before but pt says that depression gets in the way of him taking his meds.     Sees podiatrist outpt, a Dr. Leyden, who was debriding the wound regularly and doing dressing changes utilizing either betadine or silver sulfadiazine ointment.      Denies EtOH, substance use. Reports only marijauna and tobacco use (few cigarettes per day). Denies IV drug use in the past or present.   Pt is on disability since 2004 due to HIV status ? and depression. 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 Was previously a  before disability.         ED course  Vitals: 99.1F (101F rectal), , 117/73, RR18 98% O2  RA   Labs: WBC 16k, hgb 12.6, Plt 222, ESR 71, , K+ 3.1, CR 1.39, Lactate 1.6 , neg RVP   EKG:   Imaging: CT Angio neg for PE (dilated main PA)   CT LLE shows no gas, ulcers on medial volar L 1st toe, 2.4 cm subq collection at volar aspect of 1st metatarsal; diffuse cellulitis of LLE   No LLE DVT seen on ultrasound   Interventions: 3.5L NS, vanc x2, zosyn 3.375 x2, nicotine patch, ofirmevx1,   Wound culture showed + bacteria   Consults: Podiatry  (22 Jan 2025 10:13)      Review of systems negative except per HPI and as stated below  General:	 no weakness; no fevers, no chills  Skin/Breast: no rash  Respiratory and Thorax: no SOB, no cough  Cardiovascular:	No chest pain  Gastrointestinal:	 no nausea, vomiting , diarrhea  Genitourinary:	no dysuria, no difficulty urinating, no hematuria  Musculoskeletal:	no weakness, no joint swelling/pain  Neurological:	no focal weakness/numbness  Endocrine:	no polyuria, no polydipsia    PAST MEDICAL & SURGICAL HISTORY:  Human immunodeficiency virus (HIV) infection      Osteomyelitis      Pulmonary embolism      DVT, lower extremity        Home Medications:  Biktarvy 50 mg-200 mg-25 mg oral tablet: 1 tab(s) orally once a day (22 Nov 2024 05:23)  pantoprazole 40 mg oral delayed release tablet: 1 tab(s) orally once a day (30 Dec 2023 00:48)  Xarelto 10 mg oral tablet: 1 tab(s) orally once a day (22 Nov 2024 05:22)    Allergies    cefepime (Unknown)    Intolerances      FAMILY HISTORY:    Social History:       LABS                        12.6   16.91 )-----------( 222      ( 21 Jan 2025 18:49 )             38.8     01-21    135  |  99  |  11  ----------------------------<  112[H]  3.1[L]   |  27  |  1.39[H]    Ca    9.3      21 Jan 2025 18:49    TPro  8.3[H]  /  Alb  3.3[L]  /  TBili  0.7  /  DBili  x   /  AST  17  /  ALT  <6[L]  /  AlkPhos  113  01-21    PT/INR - ( 21 Jan 2025 18:49 )   PT: 17.7 sec;   INR: 1.52          PTT - ( 21 Jan 2025 18:49 )  PTT:33.4 sec    Vital Signs Last 24 Hrs  T(C): 36.5 (22 Jan 2025 09:50), Max: 38.5 (21 Jan 2025 19:36)  T(F): 97.7 (22 Jan 2025 09:50), Max: 101.3 (21 Jan 2025 19:36)  HR: 89 (22 Jan 2025 09:50) (77 - 105)  BP: 125/78 (22 Jan 2025 09:50) (108/55 - 125/78)  BP(mean): --  RR: 18 (22 Jan 2025 09:50) (15 - 18)  SpO2: 96% (22 Jan 2025 09:50) (96% - 99%)    Parameters below as of 22 Jan 2025 09:50  Patient On (Oxygen Delivery Method): room air        PHYSICAL EXAM  General: NAD, AA0x3    Left Lower Extremity Focused:  Vasc: 2/4 PT and DP, CFT wnl, TG warm to warm, edema to forefoot, erythema extended the leg  Derm: Left hallux plantar distal wound -PTB, mild fluctuance, erythema samantha wound, no malodor, serous drainage upon manual pressure  Neuro: Protective sensations intact  MSK: s/p R     RADIOLOGY    < from: Xray Foot AP + Lateral + Oblique, Left (01.22.25 @ 12:40) >  IMPRESSION:  Hallux soft tissue swelling with focal plantar ulceration. Laterally   subluxed hallux IP joint with eroded articular margins indeterminate for   infection/septic versus inflammatory arthritic process. No tracking gas   collections or additional suspected areas of osteomyelitis; MRI could be   obtained to further assess as warranted. Redemonstrated well marginated   erosive changes along lateral 5th IP joint margin.    Stable partial 3rd ray amputation defect through distal metatarsal shaft   with tapered smoothly corticated stump margin and preserved overlying   soft tissue coverage.    No acute fractures or dislocations.    Tarsometatarsal alignment maintained without evidence for a Lisfranc   injury.    2nd hammertoe deformity. Preserved remaining joint spaces and no   additional joint margin erosions.    Tiny plantar calcaneal enthesophyte. Unremarkable distal Achilles tendon   shadow.    No discrete lytic or blastic lesions.    --- End of Report ---    < end of copied text >      < from: CT Lower Extremity No Cont, Left (01.21.25 @ 21:06) >    No soft tissue gas.  Ulcers involving medial volar aspect of left great toe.  A 2.4 cm complex subcutaneous collection at the volar aspect of first   metatarsal.  Diffuse cellulitis about left lower extremity.  Moderate atherosclerotic calcification.    < end of copied text >                     Attending: Dr. 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 Previously admitted in 11/22-11/23/2024 for same concern of LLE cellulitis r/o OM and AMA'd due to his neighbor making too much noise and never finished course of abx.  2 previous visits to [facility] ED on the 10/13-10/16/24 where he presented for LLE significant for cellulitis of L anterior leg. In both occasions patient was recommended for admission but AMA'd in both instances on PO antibiotics which he reports he did not fill; he isn't clear on onset.     Per pt, he was at [facility] [place] [facility] yesterday afternoon as he was going to the [facility] to have dental work done, but he started having SOB and weakness at the airport. He was then brought to [facility] where he AMA'd due to not liking the hospital and went hme to grab a bite to eat. Then his friend came over and advised him to go to the [facility] for his leg, so he went to the hospital and was trasnferred to [facility]. He feels that his left leg cellulitis has been getting worse and noticed last night that it was becoming "redder" with more pain but he originally didn't see it becoming red because his appartement lights are blue. At this time, he says that he would like to receive a few rounds of abx but that he needs to leave to go to [facility] as his dental work is already prepaid for. Compliant with biktarvy and xarelto as of 1.5 months ago, unsure about compliance before but pt says that depression gets in the way of him taking his meds.     Sees podiatrist outpt, a Dr. Leyden, who was debriding the wound regularly and doing dressing changes utilizing either betadine or silver sulfadiazine ointment.      Denies EtOH, substance use. Reports only marijauna and tobacco use (few cigarettes per day). Denies IV drug use in the past or present.   Pt is on disability since 2004 due to HIV status ? and depression. 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 Was previously a  before disability.         ED course  Vitals: 99.1F (101F rectal), , 117/73, RR18 98% O2  RA   Labs: WBC 16k, hgb 12.6, Plt 222, ESR 71, , K+ 3.1, CR 1.39, Lactate 1.6 , neg RVP   EKG:   Imaging: CT Angio neg for PE (dilated main PA)   CT LLE shows no gas, ulcers on medial volar L 1st toe, 2.4 cm subq collection at volar aspect of 1st metatarsal; diffuse cellulitis of LLE   No LLE DVT seen on ultrasound   Interventions: 3.5L NS, vanc x2, zosyn 3.375 x2, nicotine patch, ofirmevx1,   Wound culture showed + bacteria   Consults: Podiatry  (22 Jan 2025 10:13)      Review of systems negative except per HPI and as stated below  General:	 no weakness; no fevers, no chills  Skin/Breast: no rash  Respiratory and Thorax: no SOB, no cough  Cardiovascular:	No chest pain  Gastrointestinal:	 no nausea, vomiting , diarrhea  Genitourinary:	no dysuria, no difficulty urinating, no hematuria  Musculoskeletal:	no weakness, no joint swelling/pain  Neurological:	no focal weakness/numbness  Endocrine:	no polyuria, no polydipsia    PAST MEDICAL & SURGICAL HISTORY:  Human immunodeficiency virus (HIV) infection      Osteomyelitis      Pulmonary embolism      DVT, lower extremity        Home Medications:  Biktarvy 50 mg-200 mg-25 mg oral tablet: 1 tab(s) orally once a day (22 Nov 2024 05:23)  pantoprazole 40 mg oral delayed release tablet: 1 tab(s) orally once a day (30 Dec 2023 00:48)  Xarelto 10 mg oral tablet: 1 tab(s) orally once a day (22 Nov 2024 05:22)    Allergies    cefepime (Unknown)    Intolerances      FAMILY HISTORY:    Social History:       LABS                        12.6   16.91 )-----------( 222      ( 21 Jan 2025 18:49 )             38.8     01-21    135  |  99  |  11  ----------------------------<  112[H]  3.1[L]   |  27  |  1.39[H]    Ca    9.3      21 Jan 2025 18:49    TPro  8.3[H]  /  Alb  3.3[L]  /  TBili  0.7  /  DBili  x   /  AST  17  /  ALT  <6[L]  /  AlkPhos  113  01-21    PT/INR - ( 21 Jan 2025 18:49 )   PT: 17.7 sec;   INR: 1.52          PTT - ( 21 Jan 2025 18:49 )  PTT:33.4 sec    Vital Signs Last 24 Hrs  T(C): 36.5 (22 Jan 2025 09:50), Max: 38.5 (21 Jan 2025 19:36)  T(F): 97.7 (22 Jan 2025 09:50), Max: 101.3 (21 Jan 2025 19:36)  HR: 89 (22 Jan 2025 09:50) (77 - 105)  BP: 125/78 (22 Jan 2025 09:50) (108/55 - 125/78)  BP(mean): --  RR: 18 (22 Jan 2025 09:50) (15 - 18)  SpO2: 96% (22 Jan 2025 09:50) (96% - 99%)    Parameters below as of 22 Jan 2025 09:50  Patient On (Oxygen Delivery Method): room air        PHYSICAL EXAM  General: NAD, AA0x3    Left Lower Extremity Focused:  Vasc: 2/4 PT and DP, CFT wnl, TG warm to warm, edema to forefoot, erythema extended the leg  Derm: Left hallux plantar distal wound -PTB, mild fluctuance, erythema samantha wound, no malodor, serous drainage upon manual pressure  Neuro: Protective sensations intact  MSK: s/p R 3rd digit amp    RADIOLOGY    < from: Xray Foot AP + Lateral + Oblique, Left (01.22.25 @ 12:40) >  IMPRESSION:  Hallux soft tissue swelling with focal plantar ulceration. Laterally   subluxed hallux IP joint with eroded articular margins indeterminate for   infection/septic versus inflammatory arthritic process. No tracking gas   collections or additional suspected areas of osteomyelitis; MRI could be   obtained to further assess as warranted. Redemonstrated well marginated   erosive changes along lateral 5th IP joint margin.    Stable partial 3rd ray amputation defect through distal metatarsal shaft   with tapered smoothly corticated stump margin and preserved overlying   soft tissue coverage.    No acute fractures or dislocations.    Tarsometatarsal alignment maintained without evidence for a Lisfranc   injury.    2nd hammertoe deformity. Preserved remaining joint spaces and no   additional joint margin erosions.    Tiny plantar calcaneal enthesophyte. Unremarkable distal Achilles tendon   shadow.    No discrete lytic or blastic lesions.    --- End of Report ---    < end of copied text >      < from: CT Lower Extremity No Cont, Left (01.21.25 @ 21:06) >    No soft tissue gas.  Ulcers involving medial volar aspect of left great toe.  A 2.4 cm complex subcutaneous collection at the volar aspect of first   metatarsal.  Diffuse cellulitis about left lower extremity.  Moderate atherosclerotic calcification.    < end of copied text >

## 2025-01-22 NOTE — PATIENT PROFILE ADULT - FALL HARM RISK - RISK INTERVENTIONS

## 2025-01-22 NOTE — H&P ADULT - ASSESSMENT
53 y/o M w/ PMHx of HIV/AIDS (poorly compliant w/ Biktarvy and bactrim), multiple episodes of b/l LE cellulitis and OM, tobacco smoker, DVT/PE (on xarelto; poorly compliant), antiphospholipid syndrome, secondary syphilis treated 1 year ago, peripheral neuropathy, GERD, and depression that presents to the ED from his PCP office w/ complaints of dyspnea and found to have severe sepsis 2/2 LLE cellulitis. Will admit to F for IV antibiotics.   55 y/o M w/ PMHx of HIV/AIDS (poorly compliant w/ Biktarvy and bactrim), multiple episodes of b/l LE cellulitis and OM, tobacco smoker, DVT/PE (on xarelto; poorly compliant), antiphospholipid syndrome, secondary syphilis treated 1 year ago, peripheral neuropathy, GERD, and depression that presents to the ED w/ complaints of dyspnea and found to have sepsis 2/2 LLE cellulitis. Will admit to [facility] for IV antibiotics.

## 2025-01-22 NOTE — CHART NOTE - NSCHARTNOTEFT_GEN_A_CORE
Called by nurse to bedside to evaluate patient due to patient wanting to sign out AMA, IV already removed by the time that MD was paged. Arrived at bedside and patient was dressed and beginning to walk out of the room. Patient explained that they had asked for anxiety medication or to be seen by a doctor "5 hours ago" and that I was the first doctor to see him.     Patient appears to be alert and oriented to surroundings, unable to examine fully in depth as patient was already dressed and walking towards the exit. I attempted to explain risks of signing out AMA (including harm, injury, or death), but patient spoke over me to say "yes I understand that if I leave my leg will explode and I will die" before walking towards elevators and refusing to engage in further conversation. I informed hospitalist Dr. Hernandez of this, aware. Patient left without signing AMA paperwork. Called by nurse to bedside at approximately 10:30PM to evaluate patient due to patient wanting to sign out AMA, IV already removed by the time that MD was paged. Arrived at bedside and patient was dressed and beginning to walk out of the room. Patient explained that they had asked for anxiety medication or to be seen by a doctor "5 hours ago" and that I was the first doctor to see him.     Patient appears to be alert and oriented to surroundings, unable to examine fully in depth as patient was already dressed and walking towards the exit. I attempted to explain risks of signing out AMA (including harm, injury, or death), but patient spoke over me to say "yes I understand that if I leave my leg will explode and I will die" before walking towards elevators and refusing to engage in further conversation. I informed hospitalist Dr. Hernandez of this, aware. Patient left without signing AMA paperwork.

## 2025-01-22 NOTE — H&P ADULT - ATTENDING COMMENTS
Patient seen and examined by me. Case discussed with resident and agree with the resident's findings and plan as documented in the resident's note. 54M h/o HIV/AIDS (poorly compliant w/ Biktarvy and bactrim), multiple episodes of b/l LE cellulitis and OM, tobacco smoker, DVT/PE (on xarelto; poorly compliant), antiphospholipid syndrome, secondary syphilis treated 1 year ago, peripheral neuropathy, GERD, and depression p/w SOB and erythema of LE pain a/w severe sepsis 2/2 LLE cellulitis.     1. Severe sepsis 2/2 LLE cellulitis:  -f/u BCx x2  -f/u wound culture  -lactate WNL  -c/w vanco and zosyn IV for broad spectrum  -check MRSA screen  -wound care consult  -BP stable – no indication for IVF at this time  -continue to follow demarcation of cellulitis from left foot to left knee with streaking into left inner thigh  -consult ID for their recs   -c/w pain control    2. Left great toe ulcer and left ball of the foot fluctuance:  -CT LLE revealing “Ulcers involving medial volar aspect of left great toe. A 2.4 cm complex subcutaneous collection at the volar aspect of first metatarsal.”  -podiatry consulted – f/u their recs  -f/u wound culture  -f/u ESR, CRP  -Xray of the left foot performed  -will order MRI of LLE to evaluate for osteomyelitis    3. AIDS:  -last CD4 = 127 in (11/22/24)  -f/u viral load and CD4 count  -consult ID     4. DVT/PE:  -c/w Xarelto    5. Risk for depression:  -continue to assess and consult psych as needed Patient seen and examined by me. Case discussed with resident and agree with the resident's findings and plan as documented in the resident's note. 54M h/o HIV/AIDS (poorly compliant w/ Biktarvy and bactrim), multiple episodes of b/l LE cellulitis and OM, tobacco smoker, DVT/PE (on xarelto; poorly compliant), antiphospholipid syndrome, secondary syphilis treated 1 year ago, peripheral neuropathy, GERD, and depression p/w SOB and erythema of LE pain a/w severe sepsis 2/2 LLE cellulitis.     1. Severe sepsis 2/2 LLE cellulitis:  -f/u BCx x2  -f/u wound culture  -lactate WNL  -c/w vanco and zosyn IV for broad spectrum  -check MRSA screen  -wound care consult  -BP stable – no indication for IVF at this time  -continue to follow demarcation of cellulitis from left foot to left knee with streaking into left inner thigh  -consult ID for their recs   -c/w pain control    2. Left great toe ulcer and left ball of the foot fluctuance:  -CT LLE revealing “Ulcers involving medial volar aspect of left great toe. A 2.4 cm complex subcutaneous collection at the volar aspect of first metatarsal.”  -podiatry consulted – f/u their recs  -f/u wound culture  -f/u ESR, CRP  -Xray of the left foot performed  -will order MRI of LLE to evaluate for osteomyelitis    3. AIDS:  -last CD4 = 127 in (11/22/24)  -f/u viral load and CD4 count  -c/w BIKTARVY and Bactrim  -consult ID     4. DVT/PE:  -c/w Xarelto    5. Risk for depression:  -continue to assess and consult psych as needed

## 2025-01-22 NOTE — H&P ADULT - PROBLEM SELECTOR PLAN 6
N: regular diet   E: replete as necessary  DVT: xarelto  PPI: pantoprazole  Code: full code  Dispo: 4U

## 2025-01-22 NOTE — H&P ADULT - NSHPREVIEWOFSYSTEMS_GEN_ALL_CORE

## 2025-01-22 NOTE — H&P ADULT - PROBLEM SELECTOR PROBLEM 6
[FreeTextEntry1] : As per EMR review: RAH presented with left facial weakness in April 2023. She was first under the care of Dr. Pacheco and then seen by Dr. Durant in Jan 2024. Brain MRI done in Feb 2024 showed A 2 cm enhancing mass involves the left internal auditory canal and petrous portion of the left temporal bone.   Brain MRI from 2/11/24 reviewed in Brain tumor Board on 2/13/24; LCH or bony tumor - less likely given slow growth; Schwannoma or hemangioma - may go along with slow growth; Recommendation: temporal bone CT without contrast and spine MRI w/wo gado; Referral to [facility]; Consider NF2 gene testing  > Seen by Dr. Zac Villanueva on 2/21/24 (consult scanned in EMR); advised genetic testing for NF; F/U imaging 3 months > Head CT 2/28/24  large solid and cystic mass with associated postcontrast enhancement, involving the left internal auditory canal left petrous apex measuring roughly 2 cm AP and 1 cm transverse. Differential is broad, including peripheral nerve sheath tumor or hemangioma of the 7th and/or 8th cranial nerves. Langerhans' cell histiocytosis, rhabdomyosarcoma, or other primary or secondary bone/soft tissue masses not excluded. > Seen by Dr. Corrigan 3/7/24 L SNHL; the differential is quite broad and a biopsy or direct visualization is best next course. I recommended a transmastoid approach, although transcanal with endoscope assistance or a middle fossa approach should also be considered. > C-T-L spine MRI 3/12/24 normal  03/20/2023 RAH is 9 year old girl; with father, first visit with me.  Above history reviewed and confirmed with father and RAH  Father reports that left side weakness is better since onset in April 2023 but not resolved; it has been stable with no change since Nov 2023 RAH denies aches or pains; she has no headaches. Father reports that RAH gets random rashes on the face that come and go. RAH denies difficulty swallowing, denies change in taste, no earache. She was found to have left hearing loss.  Father reports that RAH is scheduled for biopsy of the lesion by Dr. Corrigan, ENT on 4/3/24. RAH and father denies any other lumps or bumps; she does not have any MONICA macules; no history of tumors.  RAH follows with ophthalmologist; she is applying a lubricant and tapes the left eye during the night as advised by opho FHx: father denies FHx of tumors Plan: [facility] NF2 panel genetic testing  ___________________  09/23/2024  follow up > S/P lesion biopsy 4/3/24; path: Minute tissue fragment suspicious schwannoma > Per Dr. Corrigan note dated 8/16/24 L SNHL, slightly worse than prior; hearing aid discussed.  > Last Brain MRI 8/18/24 (Dr. Frank): postbiopsy changes, otherwise the mass is overall stable in size compared to the 02/11/2024 > [facility] NF2-related Schwannomatosis Test AND [facility] Hereditary Schwannomatosis Panel NEGATIVE (LZTR1, NF2, SMARCB1) Above reviewed with father and confirmed; he received the genetic test results report Father reports that decompression sx is scheduled to be done in 1 month followed by facial nerve graft at [facility]. Father reports everything seems about the same; RAH states the lower eye lid is twitching often, she sees it mostly in the morning when she wakes up but she does not feel it. RAH denies numbness or tingling of the face; no difficulty eating or drinking, hearing aid is being considered to help with academics. She continued to follow with opho @ [facility]; they continue to apply a lubricant to left eye and continue patching left eye at night RAH denies headaches or other aches or pains; no other lumps or bumps In 6th grade; doing well Nutrition, metabolism, and development symptoms Prophylactic measure

## 2025-01-22 NOTE — H&P ADULT - PROBLEM SELECTOR PLAN 4
Patient diagnosed in 1988 but poorly compliant on antiviral therapy and prophylaxis. Last VL undetectable w/ CD4 182 both as of Jul '24 however patient not currently taking Biktarvy and Bactrim since Oct '24 due to feeling depressed. Only report of opportunistic infection/AIDS defining illness has been PJP many years ago. C/o dyspnea on initial ED presentation however CXR and CT chest clear. 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 Patient follows w/ PCP in [facility].    Plan:  - Continue Bactrim DS daily for PJP ppx  - F/u cryptococcal antigen; do not restart Biktarvy at this time given IRIS concern  - F/u AM VL and CD4 count  - continue SSTI infection control as above  - outpatient collateral for compliance assessment Patient diagnosed in 1988 but poorly compliant on antiviral therapy and prophylaxis. Last VL undetectable w/ CD4 182 both as of Jul '24 however patient not currently taking Biktarvy and Bactrim since Oct '24 due to feeling depressed. Only report of opportunistic infection/AIDS defining illness has been PJP many years ago. C/o dyspnea on initial ED presentation however CXR and CT chest clear. 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 Patient follows w/ PCP in [facility].    Plan:  - Continue Bactrim DS daily for PJP ppx  - F/u AM VL and CD4 count  - outpatient collateral for compliance assessment Patient diagnosed in 1988 but poorly compliant on antiviral therapy and prophylaxis. Last VL undetectable w/ CD4 182 both as of Jul '24 however patient not currently taking Biktarvy and Bactrim since Oct '24 due to feeling depressed. Only report of opportunistic infection/AIDS defining illness has been PJP many years ago. C/o dyspnea on initial ED presentation however CXR and CT chest clear. 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 Patient follows w/ PCP in [facility].  Plan:  - Continue Bactrim DS daily for PJP ppx  - F/u AM VL and CD4 count  - outpatient collateral for compliance assessment

## 2025-01-23 ENCOUNTER — INPATIENT (INPATIENT)
Facility: HOSPITAL | Age: 55
LOS: 3 days | Discharge: LEFT BEFORE DISCHARGE | End: 2025-01-27
Attending: STUDENT IN AN ORGANIZED HEALTH CARE EDUCATION/TRAINING PROGRAM | Admitting: STUDENT IN AN ORGANIZED HEALTH CARE EDUCATION/TRAINING PROGRAM
Payer: MEDICARE

## 2025-01-23 VITALS
TEMPERATURE: 98 F | WEIGHT: 184.97 LBS | DIASTOLIC BLOOD PRESSURE: 96 MMHG | OXYGEN SATURATION: 100 % | SYSTOLIC BLOOD PRESSURE: 159 MMHG | RESPIRATION RATE: 18 BRPM | HEART RATE: 108 BPM | HEIGHT: 73 IN

## 2025-01-23 DIAGNOSIS — Z29.9 ENCOUNTER FOR PROPHYLACTIC MEASURES, UNSPECIFIED: ICD-10-CM

## 2025-01-23 DIAGNOSIS — L97.529 NON-PRESSURE CHRONIC ULCER OF OTHER PART OF LEFT FOOT WITH UNSPECIFIED SEVERITY: ICD-10-CM

## 2025-01-23 DIAGNOSIS — B20 HUMAN IMMUNODEFICIENCY VIRUS [HIV] DISEASE: ICD-10-CM

## 2025-01-23 DIAGNOSIS — L03.116 CELLULITIS OF LEFT LOWER LIMB: ICD-10-CM

## 2025-01-23 DIAGNOSIS — D68.61 ANTIPHOSPHOLIPID SYNDROME: ICD-10-CM

## 2025-01-23 LAB
ALBUMIN SERPL ELPH-MCNC: 2.9 G/DL — LOW (ref 3.4–5)
ALP SERPL-CCNC: 102 U/L — SIGNIFICANT CHANGE UP (ref 40–120)
ALT FLD-CCNC: 13 U/L — SIGNIFICANT CHANGE UP (ref 12–42)
ANION GAP SERPL CALC-SCNC: 10 MMOL/L — SIGNIFICANT CHANGE UP (ref 9–16)
AST SERPL-CCNC: 32 U/L — SIGNIFICANT CHANGE UP (ref 15–37)
BASOPHILS # BLD AUTO: 0.03 K/UL — SIGNIFICANT CHANGE UP (ref 0–0.2)
BASOPHILS NFR BLD AUTO: 0.3 % — SIGNIFICANT CHANGE UP (ref 0–2)
BILIRUB SERPL-MCNC: 0.4 MG/DL — SIGNIFICANT CHANGE UP (ref 0.2–1.2)
BUN SERPL-MCNC: 18 MG/DL — SIGNIFICANT CHANGE UP (ref 7–23)
CALCIUM SERPL-MCNC: 9 MG/DL — SIGNIFICANT CHANGE UP (ref 8.5–10.5)
CHLORIDE SERPL-SCNC: 107 MMOL/L — SIGNIFICANT CHANGE UP (ref 96–108)
CO2 SERPL-SCNC: 21 MMOL/L — LOW (ref 22–31)
CREAT SERPL-MCNC: 1.21 MG/DL — SIGNIFICANT CHANGE UP (ref 0.5–1.3)
EGFR: 70 ML/MIN/1.73M2 — SIGNIFICANT CHANGE UP
EOSINOPHIL # BLD AUTO: 0.06 K/UL — SIGNIFICANT CHANGE UP (ref 0–0.5)
EOSINOPHIL NFR BLD AUTO: 0.7 % — SIGNIFICANT CHANGE UP (ref 0–6)
GLUCOSE SERPL-MCNC: 79 MG/DL — SIGNIFICANT CHANGE UP (ref 70–99)
HCT VFR BLD CALC: 35.8 % — LOW (ref 39–50)
HGB BLD-MCNC: 11.9 G/DL — LOW (ref 13–17)
IMM GRANULOCYTES NFR BLD AUTO: 0.5 % — SIGNIFICANT CHANGE UP (ref 0–0.9)
INR BLD: 1.27 — HIGH (ref 0.85–1.16)
LYMPHOCYTES # BLD AUTO: 1.59 K/UL — SIGNIFICANT CHANGE UP (ref 1–3.3)
LYMPHOCYTES # BLD AUTO: 18 % — SIGNIFICANT CHANGE UP (ref 13–44)
MCHC RBC-ENTMCNC: 28.6 PG — SIGNIFICANT CHANGE UP (ref 27–34)
MCHC RBC-ENTMCNC: 33.2 G/DL — SIGNIFICANT CHANGE UP (ref 32–36)
MCV RBC AUTO: 86.1 FL — SIGNIFICANT CHANGE UP (ref 80–100)
MONOCYTES # BLD AUTO: 0.55 K/UL — SIGNIFICANT CHANGE UP (ref 0–0.9)
MONOCYTES NFR BLD AUTO: 6.2 % — SIGNIFICANT CHANGE UP (ref 2–14)
NEUTROPHILS # BLD AUTO: 6.55 K/UL — SIGNIFICANT CHANGE UP (ref 1.8–7.4)
NEUTROPHILS NFR BLD AUTO: 74.3 % — SIGNIFICANT CHANGE UP (ref 43–77)
NRBC # BLD: 0 /100 WBCS — SIGNIFICANT CHANGE UP (ref 0–0)
NRBC BLD-RTO: 0 /100 WBCS — SIGNIFICANT CHANGE UP (ref 0–0)
PLATELET # BLD AUTO: 277 K/UL — SIGNIFICANT CHANGE UP (ref 150–400)
POTASSIUM SERPL-MCNC: 5 MMOL/L — SIGNIFICANT CHANGE UP (ref 3.5–5.3)
POTASSIUM SERPL-SCNC: 5 MMOL/L — SIGNIFICANT CHANGE UP (ref 3.5–5.3)
PROT SERPL-MCNC: 8 G/DL — SIGNIFICANT CHANGE UP (ref 6.4–8.2)
PROTHROM AB SERPL-ACNC: 14.8 SEC — HIGH (ref 9.9–13.4)
RBC # BLD: 4.16 M/UL — LOW (ref 4.2–5.8)
RBC # FLD: 14.6 % — HIGH (ref 10.3–14.5)
SODIUM SERPL-SCNC: 138 MMOL/L — SIGNIFICANT CHANGE UP (ref 132–145)
WBC # BLD: 8.82 K/UL — SIGNIFICANT CHANGE UP (ref 3.8–10.5)
WBC # FLD AUTO: 8.82 K/UL — SIGNIFICANT CHANGE UP (ref 3.8–10.5)

## 2025-01-23 PROCEDURE — 99223 1ST HOSP IP/OBS HIGH 75: CPT

## 2025-01-23 PROCEDURE — 99285 EMERGENCY DEPT VISIT HI MDM: CPT

## 2025-01-23 RX ORDER — VANCOMYCIN HYDROCHLORIDE 50 MG/ML
1250 KIT ORAL ONCE
Refills: 0 | Status: COMPLETED | OUTPATIENT
Start: 2025-01-23 | End: 2025-01-23

## 2025-01-23 RX ORDER — ACETAMINOPHEN 160 MG/5ML
650 SUSPENSION ORAL EVERY 6 HOURS
Refills: 0 | Status: DISCONTINUED | OUTPATIENT
Start: 2025-01-23 | End: 2025-01-27

## 2025-01-23 RX ORDER — PIPERACILLIN SODIUM AND TAZOBACTAM SODIUM 2; 250 G/50ML; MG/50ML
3.38 INJECTION, POWDER, FOR SOLUTION INTRAVENOUS ONCE
Refills: 0 | Status: COMPLETED | OUTPATIENT
Start: 2025-01-23 | End: 2025-01-23

## 2025-01-23 RX ORDER — VANCOMYCIN HYDROCHLORIDE 50 MG/ML
1000 KIT ORAL ONCE
Refills: 0 | Status: DISCONTINUED | OUTPATIENT
Start: 2025-01-23 | End: 2025-01-23

## 2025-01-23 RX ADMIN — VANCOMYCIN HYDROCHLORIDE 166.67 MILLIGRAM(S): KIT at 18:05

## 2025-01-23 RX ADMIN — PIPERACILLIN SODIUM AND TAZOBACTAM SODIUM 200 GRAM(S): 2; 250 INJECTION, POWDER, FOR SOLUTION INTRAVENOUS at 17:40

## 2025-01-23 NOTE — H&P ADULT - PROBLEM SELECTOR PLAN 5
F: none   E: replete as needed  N: Regular diet   DVT ppx: Xarelto 10mg qd  GI ppx: none     DISPO: [facility]

## 2025-01-23 NOTE — H&P ADULT - ASSESSMENT
Patient is a 53 y/o M with pmhx of poorly controlled HIV/AIDs, multiple episodes of b/l LE cellulitis and OM, tobacco smoker, DVT/PE (on xarelto), antiphospholipid syndrome, secondary syphilis treated 1 year ago, peripheral neuropathy, GERD, and depression, who presented to the ED after leaving AMA yesterday, admitted for further treatment of known LLE cellulitis.

## 2025-01-23 NOTE — ED PROVIDER NOTE - CLINICAL SUMMARY MEDICAL DECISION MAKING FREE TEXT BOX
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 Previously admitted in 11/22-11/23/2024 for same concern of LLE cellulitis r/o OM and AMA'd due to his neighbor making too much noise and never finished course of abx.  2 previous visits to [facility] ED on the 10/13-10/16/24 where he presented for LLE significant for cellulitis of L anterior leg. In both occasions patient was recommended for admission but AMA'd in both instances on PO antibiotics which he reports he did not fill; he isn't clear on onset. Patient was most recently admitted, however yesterday AMA'd for similar reasons, here today requesting admission. On exam, no acute distress, plan to admit for treatment

## 2025-01-23 NOTE — H&P ADULT - PROBLEM SELECTOR PLAN 1
Patient with evidence of LLE cellulitis with multiple admissions in the past for a similar presentation, most recently admitted on 1/22/25, left AMA a few hours later (states he was anxious because he lost his flight and hotel). Patient has had an extensive history of cellulitis in the past w/ reported L LE 3rd digit OM s/p amputation and previous admission to [facility] in 2023 cellulitis in same site however patient AMA'd at that time. Physical exam shows flutuance over foot and going up leg suggesting underlying abscess and spread given extensive history of IV abx will cover for MRSA. Small opening below 1st big toe on affected site and patient w/o history of diabetes for which Pseudomonas coverage not required.    s/p vanc 1250mg and zosyn 3.375g in the ED     - c/w vanc 1250mg BID and zosyn 3.375g q8hrs   - vanc trough prior to 4th dose  - ID consult in AM Patient with evidence of LLE cellulitis with multiple admissions in the past for a similar presentation, most recently admitted on 1/22/25, left AMA a few hours later (states he was anxious because he lost his flight and hotel). Patient has had an extensive history of cellulitis in the past w/ reported LLE 3rd digit OM s/p amputation and previous admission to [facility] in 2023 cellulitis in same site however patient AMA'd at that time. Physical exam shows fluctuance over foot and going up leg suggesting underlying abscess   s/p vanc 1250mg and zosyn 3.375g in the ED     - c/w vanc 1250mg BID and zosyn 3.375g q8hrs   - vanc trough prior to 4th dose  - ID consult in AM

## 2025-01-23 NOTE — H&P ADULT - PROBLEM SELECTOR PLAN 4
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 Patient is now prescribed xarelto 10mg PO daily however only compliant as of 1.5 months ago because of depressive symptoms.     - c/w xarelto 10mg qd

## 2025-01-23 NOTE — H&P ADULT - NSHPPHYSICALEXAM_GEN_ALL_CORE
PHYSICAL EXAM:    Vital Signs Last 24 Hrs  T(C): 36.5 (23 Jan 2025 22:38), Max: 36.7 (23 Jan 2025 21:56)  T(F): 97.7 (23 Jan 2025 22:38), Max: 98 (23 Jan 2025 21:56)  HR: 95 (23 Jan 2025 22:38) (81 - 108)  BP: 153/88 (23 Jan 2025 22:38) (129/78 - 159/96)  BP(mean): 110 (23 Jan 2025 22:38) (110 - 110)  RR: 18 (23 Jan 2025 22:38) (18 - 20)  SpO2: 98% (23 Jan 2025 22:38) (94% - 100%)    Parameters below as of 23 Jan 2025 22:38  Patient On (Oxygen Delivery Method): room air      I&O's Summary      General: NAD, well developed, well nourished, appears stated age  HEENT: NC/AT; EOMI, PERRLA, anicteric sclera; MMM.  Neck: supple, trachea midline  Cardiovascular: RRR, +S1/S2; NO MRG  Respiratory: CTAB; no W/R/R  Gastrointestinal: soft, NTND abdomen; +BSx4  Extremities: WWP; no edema, clubbing, or cyanosis  Vascular: 2+ radial pulses b/l, DP/PT pulses b/l  Neurological: AAOx3; no focal deficits  Dermatological: no rashes, skin intact PHYSICAL EXAM:    Vital Signs Last 24 Hrs  T(C): 36.5 (23 Jan 2025 22:38), Max: 36.7 (23 Jan 2025 21:56)  T(F): 97.7 (23 Jan 2025 22:38), Max: 98 (23 Jan 2025 21:56)  HR: 95 (23 Jan 2025 22:38) (81 - 108)  BP: 153/88 (23 Jan 2025 22:38) (129/78 - 159/96)  BP(mean): 110 (23 Jan 2025 22:38) (110 - 110)  RR: 18 (23 Jan 2025 22:38) (18 - 20)  SpO2: 98% (23 Jan 2025 22:38) (94% - 100%)    Parameters below as of 23 Jan 2025 22:38  Patient On (Oxygen Delivery Method): room air      I&O's Summary      General: NAD, appears stated age  HEENT: NC/AT  Cardiovascular: RRR  Respiratory: CTAB; no increased WOB or accessory muscle use   Gastrointestinal: soft, NTND abdomen   Extremities: WWP; LLE edema and erythema extending from the L ankle to above the L knee   Vascular: 2+ radial pulses b/l   Neurological: AAOx3

## 2025-01-23 NOTE — H&P ADULT - HISTORY OF PRESENT ILLNESS
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 Patient was previously admitted 1/22/25 for his LLE cellulitis but left AMA a few hours later due to not receiving anxiety medication. Patient was also previously admitted 11/22-11/23/2024 for same concern of LLE cellulitis r/o OM and AMA'd due to his neighbor making too much noise and never finished course of abx.    Sees podiatrist outpt, a Dr. Leyden, who was debriding the wound regularly and doing dressing changes utilizing either betadine or silver sulfadiazine ointment.      In the ED  Vitals: T 97.7, , /96, RR 18, SpO2 94% on RA   Labs: WBC 8.82 (was 16.91 on 1/21/25), Hgb 11.9, PT 14.8, IN 1.27, Cr 1.21 (baseline 1.06), albumin 2.9  Imaging none obtained on this presentation.   From 1/22/25: LLE doppler no evidence of LLE DVT  CT LLE: No soft tissue gas. Ulcers involving medial volar aspect of left great toe. A 2.4 cm complex subcutaneous collection at the volar aspect of first metatarsal. Diffuse cellulitis about left lower extremity. Moderate atherosclerotic calcification.  CT PE: No PE. Dilated main pulmonary artery suggests pulmonary arterial hypertension. Mild coronary atherosclerosis   Interventions: zosyn 3.375 and vanc 1250mg

## 2025-01-23 NOTE — H&P ADULT - NSHPLABSRESULTS_GEN_ALL_CORE
.  LABS:                         11.9   8.82  )-----------( 277      ( 23 Jan 2025 17:07 )             35.8     01-23    138  |  107  |  18  ----------------------------<  79  5.0   |  21[L]  |  1.21    Ca    9.0      23 Jan 2025 17:07    TPro  8.0  /  Alb  2.9[L]  /  TBili  0.4  /  DBili  x   /  AST  32  /  ALT  13  /  AlkPhos  102  01-23    PT/INR - ( 23 Jan 2025 17:07 )   PT: 14.8 sec;   INR: 1.27            Urinalysis Basic - ( 23 Jan 2025 17:07 )    Color: x / Appearance: x / SG: x / pH: x  Gluc: 79 mg/dL / Ketone: x  / Bili: x / Urobili: x   Blood: x / Protein: x / Nitrite: x   Leuk Esterase: x / RBC: x / WBC x   Sq Epi: x / Non Sq Epi: x / Bacteria: x            RADIOLOGY, EKG & ADDITIONAL TESTS: Reviewed.

## 2025-01-23 NOTE — ED ADULT NURSE NOTE - NS ED NURSE REPORT GIVEN TO FT
I have seen and examined the patient. Confirmed findings of acute UTI, discussed with student and agree with the findings and plan as documented. Counseled the patient in regards to next steps.   Tiffanie

## 2025-01-23 NOTE — ED PROVIDER NOTE - OBJECTIVE STATEMENT
The patient is a 55y/o M p/w c/o right LE cellulitis. Patient recently AMA'd from [facility] for treatment of cellulitis. Patient states that he had a personal obligation, he was unable to receive antibiotics, denies any chest pain, SOB, or difficulty breathing. 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 Previously admitted in 11/22-11/23/2024 for same concern of LLE cellulitis r/o OM and AMA'd due to his neighbor making too much noise and never finished course of abx.  2 previous visits to [facility] ED on the 10/13-10/16/24 where he presented for LLE significant for cellulitis of L anterior leg. In both occasions patient was recommended for admission but AMA'd in both instances on PO antibiotics which he reports he did not fill; he isn't clear on onset. Patient was most recently admitted, however yesterday AMA'd for similar reasons, here today requesting admission.

## 2025-01-23 NOTE — ED ADULT NURSE NOTE - OBJECTIVE STATEMENT
Patient is alert and oriented x 4 denies chest pain or SOB no cardiac or respiratory distress noted.  Patient  stated reason for ER visit is due to left upper and lower leg  pain, swelling  and t redness. Patient stated he has had DVT's and PE's in the past. Patient stated he is currently Xarelto. Patient currently stable.

## 2025-01-23 NOTE — H&P ADULT - PROBLEM SELECTOR PLAN 3
Patient diagnosed in 1988 but poorly compliant on antiviral therapy and prophylaxis. Last VL undetectable w/ CD4 182 both as of Jul '24 however patient not currently taking Biktarvy and Bactrim since Oct '24 due to feeling depressed. Only report of opportunistic infection/AIDS defining illness has been PJP many years ago. 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 Patient follows w/ PCP in [facility].     - c/w Biktarvy and Bactrim for PCP ppx, per patient he takes them daily at 11AM   - f/u AM VL and CD4

## 2025-01-23 NOTE — ED ADULT NURSE REASSESSMENT NOTE - NS ED NURSE REASSESS COMMENT FT1
Second time attempting to give report to 7 URIS at telephone number [phone] however bed is not clean

## 2025-01-23 NOTE — ED PROVIDER NOTE - PHYSICAL EXAMINATION
LLE cellulitis, left lower extremity erythema, hyperpigmentation, erythema also around his knee    Gen: Well-developed, well-nourished, NAD, VS as noted by nursing. HEENT: NCAT, mmm   Chest: RRR, nl S1 and S2, no m/r/g. Resp: CTAB, no w/r/r  Abd: nl BS, soft, nt/nd. Ext: Warm, dry  Neuro: CN II-XII intact, normal and equal strength, sensation, and reflexes bilaterally, normal gait  Psych: AAOx3

## 2025-01-23 NOTE — H&P ADULT - PROBLEM SELECTOR PLAN 2
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   X Ray L foot 11/21 per report shows Hallux soft tissue swelling with focal plantar ulceration. IP joint with eroded articular margins indeterminate for infection/septic versus inflammatory arthritic process.  Repeat XRay Hallux soft tissue swelling with focal plantar ulceration. Laterally subluxed hallux IP joint with eroded articular margins indeterminate for infection/septic versus inflammatory arthritic process. No tracking gas collections or additional suspected areas of osteomyelitis.     - podiatry consult in AM  - f/u MRI L foot to rule out OM as per podiatry recs from prior admission (1/22/25)  - f/u deep wound culture

## 2025-01-23 NOTE — H&P ADULT - ATTENDING COMMENTS
55 yo M with PMHx current active smoker, antiphospholipid syndrome, DVT/PE (Xarelto), HIV/AIDS (CD4 count 127 in 11/2024), recurrent BLE cellulitis, L 3rd digit amputation (i/s/o OM), secondary syphilis (s/p tx), GERD, depression px from home 1d after leaving AMA for readmission for continued evaluation and management of LLE cellulitis with ulcer and concern for underlying OM.      [ ] Vancomycin + Zosyn   - Wound cx 1/22 with mixed growth concerning for contamination   - Vancomycin level (Sent from ED)   [ ] MR L Foot (R/O OM)   - Per most recent podiatry recommendations 1/22   [ ] Podiatry reconsult in AM*   [ ] Pain control PRN   [ ] Wound care consult

## 2025-01-24 LAB
-  AMIKACIN: SIGNIFICANT CHANGE UP
-  CEFEPIME: SIGNIFICANT CHANGE UP
-  CEFTAZIDIME: SIGNIFICANT CHANGE UP
-  CIPROFLOXACIN: SIGNIFICANT CHANGE UP
-  IMIPENEM: SIGNIFICANT CHANGE UP
-  LEVOFLOXACIN: SIGNIFICANT CHANGE UP
-  MEROPENEM: SIGNIFICANT CHANGE UP
-  PIPERACILLIN/TAZOBACTAM: SIGNIFICANT CHANGE UP
4/8 RATIO: 0.18 RATIO — LOW (ref 0.9–3.6)
4/8 RATIO: 0.21 RATIO — LOW (ref 0.9–3.6)
ABS CD8: 1071 CELLS/UL — HIGH (ref 142–740)
ABS CD8: 1250 CELLS/UL — HIGH (ref 142–740)
ANION GAP SERPL CALC-SCNC: 10 MMOL/L — SIGNIFICANT CHANGE UP (ref 5–17)
BASOPHILS # BLD AUTO: 0.03 K/UL — SIGNIFICANT CHANGE UP (ref 0–0.2)
BASOPHILS NFR BLD AUTO: 0.6 % — SIGNIFICANT CHANGE UP (ref 0–2)
BUN SERPL-MCNC: 10 MG/DL — SIGNIFICANT CHANGE UP (ref 7–23)
CALCIUM SERPL-MCNC: 8.5 MG/DL — SIGNIFICANT CHANGE UP (ref 8.4–10.5)
CD16+CD56+ CELLS NFR BLD: 17 % — SIGNIFICANT CHANGE UP (ref 5–23)
CD16+CD56+ CELLS NFR SPEC: 315 CELLS/UL — SIGNIFICANT CHANGE UP (ref 71–410)
CD19 BLASTS SPEC-ACNC: 2 % — LOW (ref 6–24)
CD19 BLASTS SPEC-ACNC: 32 CELLS/UL — LOW (ref 84–469)
CD3 BLASTS SPEC-ACNC: 1392 CELLS/UL — SIGNIFICANT CHANGE UP (ref 672–1870)
CD3 BLASTS SPEC-ACNC: 1557 CELLS/UL — SIGNIFICANT CHANGE UP (ref 672–1870)
CD3 BLASTS SPEC-ACNC: 79 % — SIGNIFICANT CHANGE UP (ref 59–83)
CD3 BLASTS SPEC-ACNC: 87 % — HIGH (ref 59–83)
CD4 %: 12 % — LOW (ref 30–62)
CD4 %: 14 % — LOW (ref 30–62)
CD8 %: 64 % — HIGH (ref 12–36)
CD8 %: 70 % — HIGH (ref 12–36)
CHLORIDE SERPL-SCNC: 107 MMOL/L — SIGNIFICANT CHANGE UP (ref 96–108)
CO2 SERPL-SCNC: 19 MMOL/L — LOW (ref 22–31)
CREAT SERPL-MCNC: 1.02 MG/DL — SIGNIFICANT CHANGE UP (ref 0.5–1.3)
CRP SERPL-MCNC: 40.4 MG/L — HIGH (ref 0–4)
CULTURE RESULTS: ABNORMAL
EGFR: 86 ML/MIN/1.73M2 — SIGNIFICANT CHANGE UP
EOSINOPHIL # BLD AUTO: 0.18 K/UL — SIGNIFICANT CHANGE UP (ref 0–0.5)
EOSINOPHIL NFR BLD AUTO: 3.4 % — SIGNIFICANT CHANGE UP (ref 0–6)
ERYTHROCYTE [SEDIMENTATION RATE] IN BLOOD: 64 MM/HR — HIGH
GLUCOSE SERPL-MCNC: 98 MG/DL — SIGNIFICANT CHANGE UP (ref 70–99)
HCT VFR BLD CALC: 36.7 % — LOW (ref 39–50)
HGB BLD-MCNC: 11.6 G/DL — LOW (ref 13–17)
IMM GRANULOCYTES NFR BLD AUTO: 0.4 % — SIGNIFICANT CHANGE UP (ref 0–0.9)
LYMPHOCYTES # BLD AUTO: 1.72 K/UL — SIGNIFICANT CHANGE UP (ref 1–3.3)
LYMPHOCYTES # BLD AUTO: 32.7 % — SIGNIFICANT CHANGE UP (ref 13–44)
MAGNESIUM SERPL-MCNC: 1.9 MG/DL — SIGNIFICANT CHANGE UP (ref 1.6–2.6)
MCHC RBC-ENTMCNC: 28 PG — SIGNIFICANT CHANGE UP (ref 27–34)
MCHC RBC-ENTMCNC: 31.6 G/DL — LOW (ref 32–36)
MCV RBC AUTO: 88.4 FL — SIGNIFICANT CHANGE UP (ref 80–100)
METHOD TYPE: SIGNIFICANT CHANGE UP
MONOCYTES # BLD AUTO: 0.4 K/UL — SIGNIFICANT CHANGE UP (ref 0–0.9)
MONOCYTES NFR BLD AUTO: 7.6 % — SIGNIFICANT CHANGE UP (ref 2–14)
NEUTROPHILS # BLD AUTO: 2.91 K/UL — SIGNIFICANT CHANGE UP (ref 1.8–7.4)
NEUTROPHILS NFR BLD AUTO: 55.3 % — SIGNIFICANT CHANGE UP (ref 43–77)
NRBC # BLD: 0 /100 WBCS — SIGNIFICANT CHANGE UP (ref 0–0)
NRBC BLD-RTO: 0 /100 WBCS — SIGNIFICANT CHANGE UP (ref 0–0)
ORGANISM # SPEC MICROSCOPIC CNT: ABNORMAL
ORGANISM # SPEC MICROSCOPIC CNT: SIGNIFICANT CHANGE UP
PHOSPHATE SERPL-MCNC: 3.1 MG/DL — SIGNIFICANT CHANGE UP (ref 2.5–4.5)
PLATELET # BLD AUTO: 224 K/UL — SIGNIFICANT CHANGE UP (ref 150–400)
POTASSIUM SERPL-MCNC: 3.4 MMOL/L — LOW (ref 3.5–5.3)
POTASSIUM SERPL-SCNC: 3.4 MMOL/L — LOW (ref 3.5–5.3)
RBC # BLD: 4.15 M/UL — LOW (ref 4.2–5.8)
RBC # FLD: 14.3 % — SIGNIFICANT CHANGE UP (ref 10.3–14.5)
SODIUM SERPL-SCNC: 136 MMOL/L — SIGNIFICANT CHANGE UP (ref 135–145)
SPECIMEN SOURCE: SIGNIFICANT CHANGE UP
T-CELL CD4 SUBSET PNL BLD: 197 CELLS/UL — LOW (ref 489–1457)
T-CELL CD4 SUBSET PNL BLD: 260 CELLS/UL — LOW (ref 489–1457)
VANCOMYCIN TROUGH SERPL-MCNC: <4 UG/ML — LOW (ref 10–20)
WBC # BLD: 5.26 K/UL — SIGNIFICANT CHANGE UP (ref 3.8–10.5)
WBC # FLD AUTO: 5.26 K/UL — SIGNIFICANT CHANGE UP (ref 3.8–10.5)

## 2025-01-24 PROCEDURE — 99233 SBSQ HOSP IP/OBS HIGH 50: CPT | Mod: GC

## 2025-01-24 RX ORDER — RIVAROXABAN 20 MG/1
10 TABLET, FILM COATED ORAL
Refills: 0 | Status: DISCONTINUED | OUTPATIENT
Start: 2025-01-24 | End: 2025-01-24

## 2025-01-24 RX ORDER — NICOTINE POLACRILEX 4 MG/1
1 LOZENGE ORAL DAILY
Refills: 0 | Status: DISCONTINUED | OUTPATIENT
Start: 2025-01-24 | End: 2025-01-25

## 2025-01-24 RX ORDER — PIPERACILLIN SODIUM AND TAZOBACTAM SODIUM 2; 250 G/50ML; MG/50ML
4.5 INJECTION, POWDER, FOR SOLUTION INTRAVENOUS EVERY 8 HOURS
Refills: 0 | Status: DISCONTINUED | OUTPATIENT
Start: 2025-01-24 | End: 2025-01-25

## 2025-01-24 RX ORDER — SULFAMETHOXAZOLE AND TRIMETHOPRIM 400; 80 MG/1; MG/1
1 TABLET ORAL EVERY 24 HOURS
Refills: 0 | Status: DISCONTINUED | OUTPATIENT
Start: 2025-01-24 | End: 2025-01-27

## 2025-01-24 RX ORDER — RIVAROXABAN 20 MG/1
10 TABLET, FILM COATED ORAL
Refills: 0 | Status: DISCONTINUED | OUTPATIENT
Start: 2025-01-24 | End: 2025-01-27

## 2025-01-24 RX ORDER — PIPERACILLIN SODIUM AND TAZOBACTAM SODIUM 2; 250 G/50ML; MG/50ML
3.38 INJECTION, POWDER, FOR SOLUTION INTRAVENOUS EVERY 8 HOURS
Refills: 0 | Status: DISCONTINUED | OUTPATIENT
Start: 2025-01-24 | End: 2025-01-24

## 2025-01-24 RX ORDER — BICTEGRAVIR SODIUM, EMTRICITABINE, AND TENOFOVIR ALAFENAMIDE FUMARATE 50; 200; 25 MG/1; MG/1; MG/1
1 TABLET ORAL EVERY 24 HOURS
Refills: 0 | Status: DISCONTINUED | OUTPATIENT
Start: 2025-01-24 | End: 2025-01-27

## 2025-01-24 RX ORDER — POTASSIUM CHLORIDE 750 MG/1
20 TABLET, EXTENDED RELEASE ORAL ONCE
Refills: 0 | Status: COMPLETED | OUTPATIENT
Start: 2025-01-24 | End: 2025-01-24

## 2025-01-24 RX ORDER — POTASSIUM CHLORIDE 750 MG/1
40 TABLET, EXTENDED RELEASE ORAL ONCE
Refills: 0 | Status: COMPLETED | OUTPATIENT
Start: 2025-01-24 | End: 2025-01-24

## 2025-01-24 RX ORDER — LIDOCAINE HYDROCHLORIDE 30 MG/G
1 CREAM TOPICAL ONCE
Refills: 0 | Status: COMPLETED | OUTPATIENT
Start: 2025-01-24 | End: 2025-01-24

## 2025-01-24 RX ORDER — VANCOMYCIN HYDROCHLORIDE 50 MG/ML
1250 KIT ORAL EVERY 12 HOURS
Refills: 0 | Status: COMPLETED | OUTPATIENT
Start: 2025-01-24 | End: 2025-01-24

## 2025-01-24 RX ADMIN — SULFAMETHOXAZOLE AND TRIMETHOPRIM 1 TABLET(S): 400; 80 TABLET ORAL at 14:28

## 2025-01-24 RX ADMIN — PIPERACILLIN SODIUM AND TAZOBACTAM SODIUM 25 GRAM(S): 2; 250 INJECTION, POWDER, FOR SOLUTION INTRAVENOUS at 02:12

## 2025-01-24 RX ADMIN — ACETAMINOPHEN 650 MILLIGRAM(S): 160 SUSPENSION ORAL at 19:01

## 2025-01-24 RX ADMIN — PIPERACILLIN SODIUM AND TAZOBACTAM SODIUM 25 GRAM(S): 2; 250 INJECTION, POWDER, FOR SOLUTION INTRAVENOUS at 14:49

## 2025-01-24 RX ADMIN — BICTEGRAVIR SODIUM, EMTRICITABINE, AND TENOFOVIR ALAFENAMIDE FUMARATE 1 TABLET(S): 50; 200; 25 TABLET ORAL at 14:28

## 2025-01-24 RX ADMIN — POTASSIUM CHLORIDE 40 MILLIEQUIVALENT(S): 750 TABLET, EXTENDED RELEASE ORAL at 14:28

## 2025-01-24 RX ADMIN — ACETAMINOPHEN 650 MILLIGRAM(S): 160 SUSPENSION ORAL at 01:02

## 2025-01-24 RX ADMIN — RIVAROXABAN 10 MILLIGRAM(S): 20 TABLET, FILM COATED ORAL at 14:27

## 2025-01-24 RX ADMIN — NICOTINE POLACRILEX 1 PATCH: 4 LOZENGE ORAL at 18:33

## 2025-01-24 RX ADMIN — VANCOMYCIN HYDROCHLORIDE 166.67 MILLIGRAM(S): KIT at 19:01

## 2025-01-24 RX ADMIN — VANCOMYCIN HYDROCHLORIDE 166.67 MILLIGRAM(S): KIT at 05:48

## 2025-01-24 RX ADMIN — ACETAMINOPHEN 650 MILLIGRAM(S): 160 SUSPENSION ORAL at 00:02

## 2025-01-24 RX ADMIN — PIPERACILLIN SODIUM AND TAZOBACTAM SODIUM 25 GRAM(S): 2; 250 INJECTION, POWDER, FOR SOLUTION INTRAVENOUS at 21:54

## 2025-01-24 RX ADMIN — PIPERACILLIN SODIUM AND TAZOBACTAM SODIUM 25 GRAM(S): 2; 250 INJECTION, POWDER, FOR SOLUTION INTRAVENOUS at 07:11

## 2025-01-24 RX ADMIN — NICOTINE POLACRILEX 1 PATCH: 4 LOZENGE ORAL at 18:15

## 2025-01-24 RX ADMIN — POTASSIUM CHLORIDE 20 MILLIEQUIVALENT(S): 750 TABLET, EXTENDED RELEASE ORAL at 14:50

## 2025-01-24 NOTE — DISCHARGE NOTE PROVIDER - HOSPITAL COURSE
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 Arrived to ED 1/23 slightly tachycardic with otherwise normal vitals without leukocytosis, and CBC/BMP grossly normal. No imaging performed b/c imaging had been performed on prior admission (results below). Admitted to medicine, started on vanc 1250mg BID, zosyn 3.375mg q8h, podiatry consulted, ordered MRI per podiatry recs d/t concern OM given chronic ulcer on left foot, restarted home meds including biktarvy, bactrim, xarelto. Next day increased zosyn to 4.5 for pseudomonal dosing given left foot ulcer wound cultured prior admission growing pseudomonas in addition to polymicrobial growth.    Plan  -     Imaging from 1/22/25  LLE doppler no evidence of LLE DVT  CT LLE: No soft tissue gas. Ulcers involving medial volar aspect of left great toe. A 2.4 cm complex subcutaneous collection at the volar aspect of first metatarsal. Diffuse cellulitis about left lower extremity. Moderate atherosclerotic calcification.  CT PE: No PE. Dilated main pulmonary artery suggests pulmonary arterial hypertension. Mild coronary atherosclerosis     Hospital course (by problem):    ·  Problem: Left leg cellulitis.   ·  Plan: Patient with evidence of LLE cellulitis with multiple admissions in the past for a similar presentation, most recently admitted on 1/22/25, left AMA a few hours later (states he was anxious because he lost his flight and hotel). 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 Physical exam shows fluctuance over foot and going up leg suggesting underlying abscess   s/p vanc 1250mg and zosyn 3.375g in the ED     - c/w vanc 1250mg BID and zosyn 3.375g q8hrs   - vanc trough prior to 4th dose  - ID consult in AM.    ·  Problem: Skin ulcer of left great toe.   ·  Plan: Patient w/ evidence of a chronic ventral ulcer of the L hallux since the summer per patient and poorly compliant with at home wound care. 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   X Ray L foot 11/21 per report shows Hallux soft tissue swelling with focal plantar ulceration. IP joint with eroded articular margins indeterminate for infection/septic versus inflammatory arthritic process.  Repeat XRay Hallux soft tissue swelling with focal plantar ulceration. Laterally subluxed hallux IP joint with eroded articular margins indeterminate for infection/septic versus inflammatory arthritic process. No tracking gas collections or additional suspected areas of osteomyelitis.     - podiatry consult in AM  - f/u MRI L foot to rule out OM as per podiatry recs from prior admission (1/22/25)  - f/u deep wound culture.    ·  Problem: AIDS.   ·  Plan: Patient diagnosed in 1988 but poorly compliant on antiviral therapy and prophylaxis. Last VL undetectable w/ CD4 182 both as of Jul '24 however patient not currently taking Biktarvy and Bactrim since Oct '24 due to feeling depressed. Only report of opportunistic infection/AIDS defining illness has been PJP many years ago. 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 Patient follows w/ PCP in [facility].     - c/w Biktarvy and Bactrim for PCP ppx, per patient he takes them daily at 11AM   - f/u AM VL and CD4.    ·  Problem: Antiphospholipid syndrome.   ·  Plan: Patient diagnosed in 2023 after he had previous DVT/PE in Jan and May '23. Patient is now prescribed xarelto 10mg PO daily however only compliant as of 1.5 months ago because of depressive symptoms.     - c/w xarelto 10mg qd.    ·  Problem: Prophylactic measure.   ·  Plan: F: none   E: replete as needed  N: Regular diet   DVT ppx: Xarelto 10mg qd  GI ppx: none     DISPO: RMF.      Patient was discharged to: (home/[facility]/acute rehab/hospice, etc, and with what services – home health PT/RN? Home O2?)    New medications:   Changes to old medications:  Medications that were stopped:    Items to follow up as outpatient:    Physical exam at the time of discharge:    Constitutional: resting comfortably in bed; NAD  Head: NC/AT  Eyes: PERRL, EOMI, anicteric sclera  ENT: no nasal discharge; MMM  Neck: supple; no JVD or thyromegaly  Respiratory: CTA B/L; no W/R/R, no retractions  Cardiac: +S1/S2; RRR; no M/R/G  Gastrointestinal: abdomen soft, NT/ND; no rebound or guarding; +BSx4  Back: spine midline, no bony tenderness or step-offs; no CVAT B/L  Extremities: WWP, no clubbing or cyanosis; no peripheral edema  Musculoskeletal: NROM x4; no joint swelling, tenderness or erythema  Vascular: 2+ radial, DP/PT pulses B/L  Dermatologic: skin warm, dry and intact; no rashes, wounds, or scars  Lymphatic: no submandibular or cervical LAD  Neurologic: AAOx3; CNII-XII grossly intact; no focal deficits  Psychiatric: affect and characteristics of appearance, verbalizations, behaviors are appropriate #Discharge: do not delete    54M PMHx poorly controlled HIV/AIDs, multiple episodes of b/l LE cellulitis and OM, tobacco smoker, DVT/PE (on xarelto), antiphospholipid syndrome, 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 Arrived to ED 1/23 slightly tachycardic with otherwise normal vitals without leukocytosis, and CBC/BMP grossly normal. No imaging performed b/c imaging had been performed on prior admission (results below). Admitted to medicine, started on vanc 1250mg BID, zosyn 3.375mg q8h, podiatry consulted, ordered MRI per podiatry recs d/t concern OM given chronic ulcer on left foot, restarted home meds including biktarvy, bactrim, xarelto. Next day increased zosyn to 4.5mg 1q8h for pseudomonal dosing given left foot ulcer wound cultured prior admission growing pseudomonas in addition to polymicrobial growth. CD4 260 this admission. Viral load _______. MRI showing ___________________.     Plan  -    Imaging from 1/22/25  LLE doppler no evidence of LLE DVT  CT LLE: No soft tissue gas. Ulcers involving medial volar aspect of left great toe. A 2.4 cm complex subcutaneous collection at the volar aspect of first metatarsal. Diffuse cellulitis about left lower extremity. Moderate atherosclerotic calcification.  CT PE: No PE. Dilated main pulmonary artery suggests pulmonary arterial hypertension. Mild coronary atherosclerosis     Hospital course (by problem):    ·  Problem: Left leg cellulitis.   ·  Plan: Patient with evidence of LLE cellulitis with multiple admissions in the past for a similar presentation, most recently admitted on 1/22/25, left AMA a few hours later (states he was anxious because he lost his flight and hotel). Patient has had an extensive history of cellulitis in the past w/ reported LLE 3rd digit OM s/p amputation and previous admission to [facility] in 2023 cellulitis in same site however patient AMA'd at that time. Physical exam shows fluctuance over foot and going up leg suggesting underlying abscess   s/p vanc 1250mg and zosyn 3.375g in the ED     - c/w vanc 1250mg BID and zosyn 3.375g q8hrs   - vanc trough prior to 4th dose    ·  Problem: Skin ulcer of left great toe.   ·  Plan: Patient w/ evidence of a chronic ventral ulcer of the L hallux since the summer per patient and poorly compliant with at home wound care. 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   X Ray L foot 11/21 per report shows Hallux soft tissue swelling with focal plantar ulceration. IP joint with eroded articular margins indeterminate for infection/septic versus inflammatory arthritic process.  Repeat XRay Hallux soft tissue swelling with focal plantar ulceration. Laterally subluxed hallux IP joint with eroded articular margins indeterminate for infection/septic versus inflammatory arthritic process. No tracking gas collections or additional suspected areas of osteomyelitis.     - podiatry consult in AM  - f/u MRI L foot to rule out OM as per podiatry recs from prior admission (1/22/25)  - f/u deep wound culture.    ·  Problem: AIDS.   ·  Plan: Patient diagnosed in 1988 but poorly compliant on antiviral therapy and prophylaxis. Last VL undetectable w/ CD4 182 both as of Jul '24 however patient not currently taking Biktarvy and Bactrim since Oct '24 due to feeling depressed. Only report of opportunistic infection/AIDS defining illness has been PJP many years ago. 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 Patient follows w/ PCP in [facility].     - c/w Biktarvy and Bactrim for PCP ppx, per patient he takes them daily at 11AM   - f/u AM VL and CD4.    ·  Problem: Antiphospholipid syndrome.   ·  Plan: Patient diagnosed in 2023 after he had previous DVT/PE in Jan and May '23. Patient is now prescribed xarelto 10mg PO daily however only compliant as of 1.5 months ago because of depressive symptoms.     - c/w xarelto 10mg qd.    ·  Problem: Prophylactic measure.   ·  Plan: F: none   E: replete as needed  N: Regular diet   DVT ppx: Xarelto 10mg qd  GI ppx: none     DISPO: RMF.      Patient was discharged to: (home/[facility]/acute rehab/hospice, etc, and with what services – home health PT/RN? Home O2?)    New medications:   Changes to old medications:  Medications that were stopped:    Items to follow up as outpatient:    Physical exam at the time of discharge:    Constitutional: resting comfortably in bed; NAD  Head: NC/AT  Eyes: PERRL, EOMI, anicteric sclera  ENT: no nasal discharge; MMM  Neck: supple; no JVD or thyromegaly  Respiratory: CTA B/L; no W/R/R, no retractions  Cardiac: +S1/S2; RRR; no M/R/G  Gastrointestinal: abdomen soft, NT/ND; no rebound or guarding; +BSx4  Back: spine midline, no bony tenderness or step-offs; no CVAT B/L  Extremities: WWP, no clubbing or cyanosis; no peripheral edema  Musculoskeletal: NROM x4; no joint swelling, tenderness or erythema  Vascular: 2+ radial, DP/PT pulses B/L  Dermatologic: skin warm, dry and intact; no rashes, wounds, or scars  Lymphatic: no submandibular or cervical LAD  Neurologic: AAOx3; CNII-XII grossly intact; no focal deficits  Psychiatric: affect and characteristics of appearance, verbalizations, behaviors are appropriate #Discharge: do not delete    54M PMHx poorly controlled HIV/AIDs, multiple episodes of b/l LE cellulitis and OM, tobacco smoker, DVT/PE (on xarelto), antiphospholipid syndrome, 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 Arrived to ED 1/23 slightly tachycardic with otherwise normal vitals without leukocytosis, and CBC/BMP grossly normal. No imaging performed b/c imaging had been performed on prior admission (results below). Admitted to medicine, started on vanc 1250mg BID, zosyn 3.375mg q8h, podiatry consulted, ordered MRI per podiatry recs d/t concern OM given chronic ulcer on left foot, restarted home meds including biktarvy, bactrim, xarelto. Next day increased zosyn to 4.5mg 1q8h for pseudomonal dosing given left foot ulcer wound cultured prior admission growing pseudomonas in addition to polymicrobial growth. CD4 260 this admission. Viral load _______. MRI showing ___________________.     Plan  -    Imaging from 1/22/25  LLE doppler no evidence of LLE DVT  CT LLE: No soft tissue gas. Ulcers involving medial volar aspect of left great toe. A 2.4 cm complex subcutaneous collection at the volar aspect of first metatarsal. Diffuse cellulitis about left lower extremity. Moderate atherosclerotic calcification.  CT PE: No PE. Dilated main pulmonary artery suggests pulmonary arterial hypertension. Mild coronary atherosclerosis     Hospital course (by problem):    ·  Problem: Left leg cellulitis.   ·  Plan: Patient with evidence of LLE cellulitis with multiple admissions in the past for a similar presentation, most recently admitted on 1/22/25, left AMA a few hours later (states he was anxious because he lost his flight and hotel). Patient has had an extensive history of cellulitis in the past w/ reported LLE 3rd digit OM s/p amputation and previous admission to [facility] in 2023 cellulitis in same site however patient AMA'd at that time. Physical exam shows fluctuance over foot and going up leg suggesting underlying abscess   s/p vanc 1250mg and zosyn 3.375g in the ED     - c/w vanc 1250mg BID and zosyn 3.375g q8hrs   - vanc trough prior to 4th dose    ·  Problem: Skin ulcer of left great toe.   ·  Plan: Patient w/ evidence of a chronic ventral ulcer of the L hallux since the summer per patient and poorly compliant with at home wound care. 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   X Ray L foot 11/21 per report shows Hallux soft tissue swelling with focal plantar ulceration. IP joint with eroded articular margins indeterminate for infection/septic versus inflammatory arthritic process.  Repeat XRay Hallux soft tissue swelling with focal plantar ulceration. Laterally subluxed hallux IP joint with eroded articular margins indeterminate for infection/septic versus inflammatory arthritic process. No tracking gas collections or additional suspected areas of osteomyelitis.     - podiatry consult in AM  - f/u MRI L foot to rule out OM as per podiatry recs from prior admission (1/22/25)  - f/u deep wound culture.    ·  Problem: AIDS.   ·  Plan: Patient diagnosed in 1988 but poorly compliant on antiviral therapy and prophylaxis. Last VL undetectable w/ CD4 182 both as of Jul '24 however patient not currently taking Biktarvy and Bactrim since Oct '24 due to feeling depressed. Only report of opportunistic infection/AIDS defining illness has been PJP many years ago. 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 Patient follows w/ PCP in [facility].     - c/w Biktarvy and Bactrim for PCP ppx, per patient he takes them daily at 11AM   - f/u AM VL and CD4.    ·  Problem: Antiphospholipid syndrome.   ·  Plan: Patient diagnosed in 2023 after he had previous DVT/PE in Jan and May '23. Patient is now prescribed xarelto 10mg PO daily however only compliant as of 1.5 months ago because of depressive symptoms.     - c/w xarelto 10mg qd.    ·  Problem: Prophylactic measure.   ·  Plan: F: none   E: replete as needed  N: Regular diet   DVT ppx: Xarelto 10mg qd  GI ppx: none   DISPO: RMF.    Patient was discharged to: (home/[facility]/acute rehab/hospice, etc, and with what services – home health PT/RN? Home O2?)    New medications:   Changes to old medications:  Medications that were stopped:    Items to follow up as outpatient:    Physical exam at the time of discharge:    Constitutional: resting comfortably in bed; NAD  Head: NC/AT  Eyes: PERRL, EOMI, anicteric sclera  ENT: no nasal discharge; MMM  Neck: supple; no JVD or thyromegaly  Respiratory: CTA B/L; no W/R/R, no retractions  Cardiac: +S1/S2; RRR; no M/R/G  Gastrointestinal: abdomen soft, NT/ND; no rebound or guarding; +BSx4  Back: spine midline, no bony tenderness or step-offs; no CVAT B/L  Extremities: WWP, no clubbing or cyanosis; no peripheral edema  Musculoskeletal: NROM x4; no joint swelling, tenderness or erythema  Vascular: 2+ radial, DP/PT pulses B/L  Dermatologic: skin warm, dry and intact; no rashes, wounds, or scars  Lymphatic: no submandibular or cervical LAD  Neurologic: AAOx3; CNII-XII grossly intact; no focal deficits  Psychiatric: affect and characteristics of appearance, verbalizations, behaviors are appropriate #Discharge: do not delete    54M PMHx poorly controlled HIV/AIDs, multiple episodes of b/l LE cellulitis and OM, tobacco smoker, DVT/PE (on xarelto), antiphospholipid syndrome, 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 Arrived to ED 1/23 slightly tachycardic with otherwise normal vitals without leukocytosis, and CBC/BMP grossly normal. No imaging performed b/c imaging had been performed on prior admission (results below). Admitted to medicine, started on vanc 1250mg BID, zosyn 3.375mg q8h, podiatry consulted, ordered MRI per podiatry recs d/t concern OM given chronic ulcer on left foot, restarted home meds including biktarvy, bactrim, xarelto. Next day increased zosyn to 4.5mg 1q8h for pseudomonal dosing given left foot ulcer wound cultured prior admission growing pseudomonas in addition to polymicrobial growth. CD4 260 this admission.       Imaging from 1/22/25  LLE doppler no evidence of LLE DVT  CT LLE: No soft tissue gas. Ulcers involving medial volar aspect of left great toe. A 2.4 cm complex subcutaneous collection at the volar aspect of first metatarsal. Diffuse cellulitis about left lower extremity. Moderate atherosclerotic calcification.  CT PE: No PE. Dilated main pulmonary artery suggests pulmonary arterial hypertension. Mild coronary atherosclerosis     Hospital course (by problem):    ·  Problem: Left leg cellulitis.   ·  Plan: Patient with evidence of LLE cellulitis with multiple admissions in the past for a similar presentation, most recently admitted on 1/22/25, left AMA a few hours later (states he was anxious because he lost his flight and hotel). Patient has had an extensive history of cellulitis in the past w/ reported LLE 3rd digit OM s/p amputation and previous admission to [facility] in 2023 cellulitis in same site however patient AMA'd at that time. Physical exam shows fluctuance over foot and going up leg suggesting underlying abscess   s/p vanc 1250mg and zosyn 3.375g in the ED     - c/w vanc 1250mg BID and zosyn 3.375g q8hrs   - vanc trough prior to 4th dose    ·  Problem: Skin ulcer of left great toe.   ·  Plan: Patient w/ evidence of a chronic ventral ulcer of the L hallux since the summer per patient and poorly compliant with at home wound care. 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   X Ray L foot 11/21 per report shows Hallux soft tissue swelling with focal plantar ulceration. IP joint with eroded articular margins indeterminate for infection/septic versus inflammatory arthritic process.  Repeat XRay Hallux soft tissue swelling with focal plantar ulceration. Laterally subluxed hallux IP joint with eroded articular margins indeterminate for infection/septic versus inflammatory arthritic process. No tracking gas collections or additional suspected areas of osteomyelitis.     - podiatry consult in AM  - f/u MRI L foot to rule out OM as per podiatry recs from prior admission (1/22/25)  - f/u deep wound culture.    ·  Problem: AIDS.   ·  Plan: Patient diagnosed in 1988 but poorly compliant on antiviral therapy and prophylaxis. Last VL undetectable w/ CD4 182 both as of Jul '24 however patient not currently taking Biktarvy and Bactrim since Oct '24 due to feeling depressed. Only report of opportunistic infection/AIDS defining illness has been PJP many years ago. 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 Patient follows w/ PCP in [facility].     - c/w Biktarvy and Bactrim for PCP ppx, per patient he takes them daily at 11AM   - f/u AM VL and CD4.    ·  Problem: Antiphospholipid syndrome.   ·  Plan: Patient diagnosed in 2023 after he had previous DVT/PE in Jan and May '23. Patient is now prescribed xarelto 10mg PO daily however only compliant as of 1.5 months ago because of depressive symptoms.     - c/w xarelto 10mg qd.    ·  Problem: Prophylactic measure.   ·  Plan: F: none   E: replete as needed  N: Regular diet   DVT ppx: Xarelto 10mg qd  GI ppx: none   DISPO: RMF.    Patient was discharged to: (home/[facility]/acute rehab/hospice, etc, and with what services – home health PT/RN? Home O2?)    New medications:   Changes to old medications:  Medications that were stopped:    Items to follow up as outpatient:    Physical exam at the time of discharge:    Constitutional: resting comfortably in bed; NAD  Head: NC/AT  Eyes: PERRL, EOMI, anicteric sclera  ENT: no nasal discharge; MMM  Neck: supple; no JVD or thyromegaly  Respiratory: CTA B/L; no W/R/R, no retractions  Cardiac: +S1/S2; RRR; no M/R/G  Gastrointestinal: abdomen soft, NT/ND; no rebound or guarding; +BSx4  Back: spine midline, no bony tenderness or step-offs; no CVAT B/L  Extremities: WWP, no clubbing or cyanosis; no peripheral edema  Musculoskeletal: NROM x4; no joint swelling, tenderness or erythema  Vascular: 2+ radial, DP/PT pulses B/L  Dermatologic: skin warm, dry and intact; no rashes, wounds, or scars  Lymphatic: no submandibular or cervical LAD  Neurologic: AAOx3; CNII-XII grossly intact; no focal deficits  Psychiatric: affect and characteristics of appearance, verbalizations, behaviors are appropriate

## 2025-01-24 NOTE — PATIENT PROFILE ADULT - PRO INTERPRETER NEED 2
Troponin 133>129>129  Likely type II MI, likely due to hypertensive emergency.  Troponin trend flat, Low suspicion for ACS.  TSH elevated, free T4 normal  LDL  Cardiology consulted, recommended Echo and pharmacologic stress test  Echo: LVEF 60%, normal systolic function, G1DD  Stress Test: LV perfusion is normal     Plan:  Carvedilol 6.25mg PO BID started while inpatient, to be continued on discharge  Patient instructed to monitor BP at home, and keep a log for her PCP visit  Cardiology recommended continuing Atorvastatin 40mg, started while inpatient. However, the patient is hesitant to start more medications. So plan to discuss initiating a statin with her PCP at follow up.   Smoking cessation encouraged  Outpatient follow up with PCP within 1 week   English

## 2025-01-24 NOTE — PROGRESS NOTE ADULT - PROBLEM SELECTOR PLAN 1
Patient with evidence of LLE cellulitis with multiple admissions in the past for a similar presentation, most recently admitted on 1/22/25, left AMA a few hours later (states he was anxious because he lost his flight and hotel). 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 Physical exam shows fluctuance over foot and going up leg suggesting underlying abscess   s/p vanc 1250mg and zosyn 3.375g in the ED     - c/w vanc 1250mg BID and zosyn 3.375g q8hrs   - vanc trough prior to 4th dose  - ID consult in AM septic LLE cellulitis on prior admission but left AMA and now back again, now aseptic. hx of cellulitis with 3rd toe OM s/p amputation. CT on prior admission showing cellulitis with questionable 2.4cm abscess over 5th metatarsal, s/p drainage with culture by    Patient with evidence of LLE cellulitis with multiple admissions in the past for a similar presentation, most recently admitted on 1/22/25, left AMA a few hours later (states he was anxious because he lost his flight and hotel). 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 Physical exam shows fluctuance over foot and going up leg suggesting underlying abscess   s/p vanc 1250mg and zosyn 3.375g in the ED     - c/w vanc 1250mg BID   - increase zosyn 3.375g q8hrs to 4.5g q8 for pseudomonal coverage given wound culture from prior admission with this bacteria  - vanc trough prior to 4th dose Patient with evidence of LLE cellulitis with multiple admissions in the past for a similar presentation, most recently admitted on 1/22/25, left AMA a few hours later (states he was anxious because he lost his flight and hotel). 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 in 2023 cellulitis in same site however patient AMA'd at that time. Physical exam shows fluctuance over foot and going up leg suggesting underlying abscess   s/p vanc 1250mg and zosyn 3.375g in the ED     - c/w vanc 1250mg BID   - increase zosyn 3.375g q8hrs to 4.5g q8 for pseudomonal coverage given wound culture from prior admission with this bacteria  - vanc trough prior to 4th dose

## 2025-01-24 NOTE — CONSULT NOTE ADULT - ASSESSMENT
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  Podiatry consulted for L hallux wound. At the time of consult, VSS, no leukocytosis, DVT ruled out. X-rays reviewed. Erythema improving.  Concern for OM clinically.    Plan:  -c/w broad spectrum IV abx  -Recc MRI L foot to r/o OM  - f/u wound cx taken 1/22  -Foot dressed with Betadine DSD  -WBAT  -Rest of care upto primary team    Podiatry to follow, plan d/w attending

## 2025-01-24 NOTE — DISCHARGE NOTE PROVIDER - NSDCCPCAREPLAN_GEN_ALL_CORE_FT
PRINCIPAL DISCHARGE DIAGNOSIS  Diagnosis: Cellulitis  Assessment and Plan of Treatment: You were hospitalized with an infection of your leg, specifically called cellulitis. This is an infection of the outer layer of the leg. You received intravenous antibiotics here. You were also given your home medications here. You are being discharged with __________________. Please be sure to take these medications as prescribed. Please be sure to follow up with your primary care doctor for management of your medical conditions.

## 2025-01-24 NOTE — PROGRESS NOTE ADULT - PROBLEM SELECTOR PLAN 3
Patient diagnosed in 1988 but poorly compliant on antiviral therapy and prophylaxis. Last VL undetectable w/ CD4 182 both as of Jul '24 however patient not currently taking Biktarvy and Bactrim since Oct '24 due to feeling depressed. Only report of opportunistic infection/AIDS defining illness has been PJP many years ago. 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 Patient follows w/ PCP in [facility].     - c/w Biktarvy and Bactrim for PCP ppx, per patient he takes them daily at 11AM   - f/u AM VL and CD4

## 2025-01-24 NOTE — DISCHARGE NOTE PROVIDER - ATTENDING DISCHARGE PHYSICAL EXAMINATION:
Appears comfortable   MMM  Normal WOB on RA, CTAB   RRR, no mrg   Abdomen soft, nontender, nondistended  Extremities warm and without edema, L foot wrapped in clean dry gauze dressing   AOX3, no focal neuro deficits     Please note that the patient left AMA, evaluated and deemed to have capacity and understanding that leaving the hospital means suboptimal treatment of his infection. Oral antibiotics per ID recs were sent to the pharmacy and the patient was counseled to pick these up before leaving.

## 2025-01-24 NOTE — PROGRESS NOTE ADULT - SUBJECTIVE AND OBJECTIVE BOX
Patient is a 54y old  Male who presents with a chief complaint of LLE cellulitis (23 Jan 2025 23:23)      HOSPITAL COURSE:     OVERNIGHT EVENTS:    SUBJECTIVE:     ROS: otherwise negative      T(C): 36.7 (01-24-25 @ 05:38), Max: 36.7 (01-23-25 @ 21:56)  HR: 58 (01-24-25 @ 05:38) (58 - 108)  BP: 126/58 (01-24-25 @ 05:38) (126/58 - 159/96)  RR: 18 (01-24-25 @ 05:38) (18 - 20)  SpO2: 97% (01-24-25 @ 05:38) (94% - 100%)  Wt(kg): --Vital Signs Last 24 Hrs  T(C): 36.7 (24 Jan 2025 05:38), Max: 36.7 (23 Jan 2025 21:56)  T(F): 98 (24 Jan 2025 05:38), Max: 98 (23 Jan 2025 21:56)  HR: 58 (24 Jan 2025 05:38) (58 - 108)  BP: 126/58 (24 Jan 2025 05:38) (126/58 - 159/96)  BP(mean): 110 (23 Jan 2025 22:38) (110 - 110)  RR: 18 (24 Jan 2025 05:38) (18 - 20)  SpO2: 97% (24 Jan 2025 05:38) (94% - 100%)    Parameters below as of 24 Jan 2025 05:38  Patient On (Oxygen Delivery Method): room air        PHYSICAL EXAM:  Constitutional: resting comfortably in bed; NAD  Head: NC/AT  Eyes: PERRL, EOMI, anicteric sclera  ENT: no nasal discharge; MMM  Neck: supple; no JVD or thyromegaly  Respiratory: CTA B/L; no W/R/R, no retractions  Cardiac: +S1/S2; RRR; no M/R/G  Gastrointestinal: soft, NT/ND; no rebound or guarding; +BSx4  Back: spine midline, no bony tenderness or step-offs; no CVAT B/L  Extremities: WWP, no clubbing or cyanosis; no peripheral edema. Capillary refill <2 sec  Musculoskeletal: NROM x4; no joint swelling, tenderness or erythema  Vascular: 2+ radial, DP/PT pulses B/L  Dermatologic: skin warm, dry and intact; no rashes, wounds, or scars  Lymphatic: no submandibular or cervical LAD  Neurologic: AAOx3; CNII-XII grossly intact; no focal deficits  Psychiatric: affect and characteristics of appearance, verbalizations, behaviors are appropriate    LABS:                        11.6   5.26  )-----------( 224      ( 24 Jan 2025 07:52 )             36.7     01-24    136  |  107  |  10  ----------------------------<  98  3.4[L]   |  19[L]  |  1.02    Ca    8.5      24 Jan 2025 07:52  Phos  3.1     01-24  Mg     1.9     01-24    TPro  8.0  /  Alb  2.9[L]  /  TBili  0.4  /  DBili  x   /  AST  32  /  ALT  13  /  AlkPhos  102  01-23      PT/INR - ( 23 Jan 2025 17:07 )   PT: 14.8 sec;   INR: 1.27            Urinalysis Basic - ( 24 Jan 2025 07:52 )    Color: x / Appearance: x / SG: x / pH: x  Gluc: 98 mg/dL / Ketone: x  / Bili: x / Urobili: x   Blood: x / Protein: x / Nitrite: x   Leuk Esterase: x / RBC: x / WBC x   Sq Epi: x / Non Sq Epi: x / Bacteria: x      CAPILLARY BLOOD GLUCOSE            Urinalysis Basic - ( 24 Jan 2025 07:52 )    Color: x / Appearance: x / SG: x / pH: x  Gluc: 98 mg/dL / Ketone: x  / Bili: x / Urobili: x   Blood: x / Protein: x / Nitrite: x   Leuk Esterase: x / RBC: x / WBC x   Sq Epi: x / Non Sq Epi: x / Bacteria: x        MEDICATIONS  (STANDING):  bictegravir 50 mG/emtricitabine 200 mG/tenofovir alafenamide 25 mG (BIKTARVY) 1 Tablet(s) Oral every 24 hours  piperacillin/tazobactam IVPB.. 4.5 Gram(s) IV Intermittent every 8 hours  rivaroxaban 10 milliGRAM(s) Oral <User Schedule>  trimethoprim  160 mG/sulfamethoxazole 800 mG 1 Tablet(s) Oral every 24 hours  vancomycin  IVPB 1250 milliGRAM(s) IV Intermittent every 12 hours    MEDICATIONS  (PRN):  acetaminophen     Tablet .. 650 milliGRAM(s) Oral every 6 hours PRN Temp greater or equal to 38C (100.4F), Mild Pain (1 - 3)      RADIOLOGY & ADDITIONAL TESTS: Reviewed   Patient is a 54y old  Male who presents with a chief complaint of LLE cellulitis (23 Jan 2025 23:23)    OVERNIGHT EVENTS: wyatt    SUBJECTIVE: seen at bedside this AM. patient has no acute complaints nor pain. he has no pain in his right leg despite the obvious erythema and swelling, and he states that he never actually had pain in this leg. he remarked that he originally noticed the redness of his leg    ROS: otherwise negative      T(C): 36.7 (01-24-25 @ 05:38), Max: 36.7 (01-23-25 @ 21:56)  HR: 58 (01-24-25 @ 05:38) (58 - 108)  BP: 126/58 (01-24-25 @ 05:38) (126/58 - 159/96)  RR: 18 (01-24-25 @ 05:38) (18 - 20)  SpO2: 97% (01-24-25 @ 05:38) (94% - 100%)  Wt(kg): --Vital Signs Last 24 Hrs  T(C): 36.7 (24 Jan 2025 05:38), Max: 36.7 (23 Jan 2025 21:56)  T(F): 98 (24 Jan 2025 05:38), Max: 98 (23 Jan 2025 21:56)  HR: 58 (24 Jan 2025 05:38) (58 - 108)  BP: 126/58 (24 Jan 2025 05:38) (126/58 - 159/96)  BP(mean): 110 (23 Jan 2025 22:38) (110 - 110)  RR: 18 (24 Jan 2025 05:38) (18 - 20)  SpO2: 97% (24 Jan 2025 05:38) (94% - 100%)    Parameters below as of 24 Jan 2025 05:38  Patient On (Oxygen Delivery Method): room air        PHYSICAL EXAM:  Constitutional: resting comfortably in bed; NAD  Head: NC/AT  Eyes: PERRL, EOMI, anicteric sclera  ENT: no nasal discharge; MMM  Neck: supple; no JVD or thyromegaly  Respiratory: CTA B/L; no W/R/R, no retractions  Cardiac: +S1/S2; RRR; no M/R/G  Gastrointestinal: soft, NT/ND; no rebound or guarding; +BSx4  Back: spine midline, no bony tenderness or step-offs; no CVAT B/L  Extremities: WWP, no clubbing or cyanosis; no peripheral edema. Capillary refill <2 sec  Musculoskeletal: NROM x4; no joint swelling, tenderness or erythema  Vascular: 2+ radial, DP/PT pulses B/L  Dermatologic: skin warm, dry and intact; no rashes, wounds, or scars  Lymphatic: no submandibular or cervical LAD  Neurologic: AAOx3; CNII-XII grossly intact; no focal deficits  Psychiatric: affect and characteristics of appearance, verbalizations, behaviors are appropriate    LABS:                        11.6   5.26  )-----------( 224      ( 24 Jan 2025 07:52 )             36.7     01-24    136  |  107  |  10  ----------------------------<  98  3.4[L]   |  19[L]  |  1.02    Ca    8.5      24 Jan 2025 07:52  Phos  3.1     01-24  Mg     1.9     01-24    TPro  8.0  /  Alb  2.9[L]  /  TBili  0.4  /  DBili  x   /  AST  32  /  ALT  13  /  AlkPhos  102  01-23      PT/INR - ( 23 Jan 2025 17:07 )   PT: 14.8 sec;   INR: 1.27            Urinalysis Basic - ( 24 Jan 2025 07:52 )    Color: x / Appearance: x / SG: x / pH: x  Gluc: 98 mg/dL / Ketone: x  / Bili: x / Urobili: x   Blood: x / Protein: x / Nitrite: x   Leuk Esterase: x / RBC: x / WBC x   Sq Epi: x / Non Sq Epi: x / Bacteria: x      CAPILLARY BLOOD GLUCOSE            Urinalysis Basic - ( 24 Jan 2025 07:52 )    Color: x / Appearance: x / SG: x / pH: x  Gluc: 98 mg/dL / Ketone: x  / Bili: x / Urobili: x   Blood: x / Protein: x / Nitrite: x   Leuk Esterase: x / RBC: x / WBC x   Sq Epi: x / Non Sq Epi: x / Bacteria: x        MEDICATIONS  (STANDING):  bictegravir 50 mG/emtricitabine 200 mG/tenofovir alafenamide 25 mG (BIKTARVY) 1 Tablet(s) Oral every 24 hours  piperacillin/tazobactam IVPB.. 4.5 Gram(s) IV Intermittent every 8 hours  rivaroxaban 10 milliGRAM(s) Oral <User Schedule>  trimethoprim  160 mG/sulfamethoxazole 800 mG 1 Tablet(s) Oral every 24 hours  vancomycin  IVPB 1250 milliGRAM(s) IV Intermittent every 12 hours    MEDICATIONS  (PRN):  acetaminophen     Tablet .. 650 milliGRAM(s) Oral every 6 hours PRN Temp greater or equal to 38C (100.4F), Mild Pain (1 - 3)      RADIOLOGY & ADDITIONAL TESTS: Reviewed   Patient is a 54y old  Male who presents with a chief complaint of LLE cellulitis (23 Jan 2025 23:23)    OVERNIGHT EVENTS: wyatt    SUBJECTIVE: seen at bedside this AM. patient has no acute complaints nor pain. he has no pain in his right leg despite the obvious erythema and swelling, and he states that he never actually had pain in this leg. he remarked that he originally noticed the redness of his leg day of prior admission [place] airport. explained to patient current treatment with abx to which he is amenable.     ROS: otherwise negative      T(C): 36.7 (01-24-25 @ 05:38), Max: 36.7 (01-23-25 @ 21:56)  HR: 58 (01-24-25 @ 05:38) (58 - 108)  BP: 126/58 (01-24-25 @ 05:38) (126/58 - 159/96)  RR: 18 (01-24-25 @ 05:38) (18 - 20)  SpO2: 97% (01-24-25 @ 05:38) (94% - 100%)  Wt(kg): --Vital Signs Last 24 Hrs  T(C): 36.7 (24 Jan 2025 05:38), Max: 36.7 (23 Jan 2025 21:56)  T(F): 98 (24 Jan 2025 05:38), Max: 98 (23 Jan 2025 21:56)  HR: 58 (24 Jan 2025 05:38) (58 - 108)  BP: 126/58 (24 Jan 2025 05:38) (126/58 - 159/96)  BP(mean): 110 (23 Jan 2025 22:38) (110 - 110)  RR: 18 (24 Jan 2025 05:38) (18 - 20)  SpO2: 97% (24 Jan 2025 05:38) (94% - 100%)    Parameters below as of 24 Jan 2025 05:38  Patient On (Oxygen Delivery Method): room air        PHYSICAL EXAM:  Constitutional: resting comfortably in bed; NAD  Head: NC/AT  Eyes: PERRL, EOMI, anicteric sclera  Respiratory: CTA B/L; no W/R/R, no retractions  Cardiac: +S1/S2; RRR; no M/R/G  Gastrointestinal: soft, NT/ND; no rebound or guarding; +BSx4  Extremities: LLE mildly swollen with circumfrential erythema up to mid shin, reduced from inked demarcations made on prior admission, no TTP, sensation, motor intact  Musculoskeletal: NROM x4; no joint swelling, tenderness or erythema  Vascular: 2+ radial, DP/PT pulses B/L  Neurologic: AAOx3; CNII-XII grossly intact; no focal deficits  Psychiatric: affect and characteristics of appearance, verbalizations, behaviors are appropriate    LABS:                        11.6   5.26  )-----------( 224      ( 24 Jan 2025 07:52 )             36.7     01-24    136  |  107  |  10  ----------------------------<  98  3.4[L]   |  19[L]  |  1.02    Ca    8.5      24 Jan 2025 07:52  Phos  3.1     01-24  Mg     1.9     01-24    TPro  8.0  /  Alb  2.9[L]  /  TBili  0.4  /  DBili  x   /  AST  32  /  ALT  13  /  AlkPhos  102  01-23      PT/INR - ( 23 Jan 2025 17:07 )   PT: 14.8 sec;   INR: 1.27            Urinalysis Basic - ( 24 Jan 2025 07:52 )    Color: x / Appearance: x / SG: x / pH: x  Gluc: 98 mg/dL / Ketone: x  / Bili: x / Urobili: x   Blood: x / Protein: x / Nitrite: x   Leuk Esterase: x / RBC: x / WBC x   Sq Epi: x / Non Sq Epi: x / Bacteria: x      CAPILLARY BLOOD GLUCOSE            Urinalysis Basic - ( 24 Jan 2025 07:52 )    Color: x / Appearance: x / SG: x / pH: x  Gluc: 98 mg/dL / Ketone: x  / Bili: x / Urobili: x   Blood: x / Protein: x / Nitrite: x   Leuk Esterase: x / RBC: x / WBC x   Sq Epi: x / Non Sq Epi: x / Bacteria: x        MEDICATIONS  (STANDING):  bictegravir 50 mG/emtricitabine 200 mG/tenofovir alafenamide 25 mG (BIKTARVY) 1 Tablet(s) Oral every 24 hours  piperacillin/tazobactam IVPB.. 4.5 Gram(s) IV Intermittent every 8 hours  rivaroxaban 10 milliGRAM(s) Oral <User Schedule>  trimethoprim  160 mG/sulfamethoxazole 800 mG 1 Tablet(s) Oral every 24 hours  vancomycin  IVPB 1250 milliGRAM(s) IV Intermittent every 12 hours    MEDICATIONS  (PRN):  acetaminophen     Tablet .. 650 milliGRAM(s) Oral every 6 hours PRN Temp greater or equal to 38C (100.4F), Mild Pain (1 - 3)      RADIOLOGY & ADDITIONAL TESTS: Reviewed

## 2025-01-24 NOTE — PROGRESS NOTE ADULT - PROBLEM SELECTOR PLAN 2
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   X Ray L foot 11/21 per report shows Hallux soft tissue swelling with focal plantar ulceration. IP joint with eroded articular margins indeterminate for infection/septic versus inflammatory arthritic process.  Repeat XRay Hallux soft tissue swelling with focal plantar ulceration. Laterally subluxed hallux IP joint with eroded articular margins indeterminate for infection/septic versus inflammatory arthritic process. No tracking gas collections or additional suspected areas of osteomyelitis.     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   X Ray L foot 11/21 per report shows Hallux soft tissue swelling with focal plantar ulceration. IP joint with eroded articular margins indeterminate for infection/septic versus inflammatory arthritic process.  Repeat XRay Hallux soft tissue swelling with focal plantar ulceration. Laterally subluxed hallux IP joint with eroded articular margins indeterminate for infection/septic versus inflammatory arthritic process. No tracking gas collections or additional suspected areas of osteomyelitis.     - f/u podiatry recs  - f/u MRI L foot to rule out OM as per podiatry recs from prior admission (1/22/25)  - f/u deep wound culture

## 2025-01-24 NOTE — DISCHARGE NOTE PROVIDER - CARE PROVIDER_API CALL
Papito Arevalo  Internal Medicine  [address] [address]  Phone: [phone]  Fax: [phone]  Follow Up Time: 2 weeks

## 2025-01-24 NOTE — DISCHARGE NOTE PROVIDER - NSDCMRMEDTOKEN_GEN_ALL_CORE_FT
Bactrim  mg-160 mg oral tablet: 1 tab(s) orally once a day   Biktarvy 50 mg-200 mg-25 mg oral tablet: 1 tab(s) orally once a day  pantoprazole 40 mg oral delayed release tablet: 1 tab(s) orally once a day  Xarelto 10 mg oral tablet: 1 tab(s) orally once a day   amoxicillin-clavulanate 875 mg-125 mg oral tablet: 875 milligram(s) orally every 12 hours  Biktarvy 50 mg-200 mg-25 mg oral tablet: 1 tab(s) orally once a day  levoFLOXacin 750 mg oral tablet: 1 tab(s) orally once a day  pantoprazole 40 mg oral delayed release tablet: 1 tab(s) orally once a day  Xarelto 10 mg oral tablet: 1 tab(s) orally once a day

## 2025-01-24 NOTE — PATIENT PROFILE ADULT - FALL HARM RISK - RISK INTERVENTIONS

## 2025-01-25 DIAGNOSIS — M86.10 OTHER ACUTE OSTEOMYELITIS, UNSPECIFIED SITE: ICD-10-CM

## 2025-01-25 DIAGNOSIS — M00.9 PYOGENIC ARTHRITIS, UNSPECIFIED: ICD-10-CM

## 2025-01-25 LAB
ADD ON TEST-SPECIMEN IN LAB: SIGNIFICANT CHANGE UP
ADD ON TEST-SPECIMEN IN LAB: SIGNIFICANT CHANGE UP
HIV-1 VIRAL LOAD RESULT: ABNORMAL
HIV1 RNA # SERPL NAA+PROBE: 34 — SIGNIFICANT CHANGE UP
HIV1 RNA SER-IMP: SIGNIFICANT CHANGE UP
HIV1 RNA SERPL NAA+PROBE-ACNC: ABNORMAL
HIV1 RNA SERPL NAA+PROBE-LOG#: 1.53 — SIGNIFICANT CHANGE UP
VANCOMYCIN TROUGH SERPL-MCNC: 10.9 UG/ML — SIGNIFICANT CHANGE UP (ref 10–20)

## 2025-01-25 PROCEDURE — 99233 SBSQ HOSP IP/OBS HIGH 50: CPT | Mod: GC

## 2025-01-25 PROCEDURE — 73720 MRI LWR EXTREMITY W/O&W/DYE: CPT | Mod: 26,LT

## 2025-01-25 RX ORDER — VANCOMYCIN HYDROCHLORIDE 50 MG/ML
1500 KIT ORAL EVERY 12 HOURS
Refills: 0 | Status: DISCONTINUED | OUTPATIENT
Start: 2025-01-25 | End: 2025-01-25

## 2025-01-25 RX ORDER — IBUPROFEN 600 MG/1
400 TABLET, FILM COATED ORAL ONCE
Refills: 0 | Status: COMPLETED | OUTPATIENT
Start: 2025-01-25 | End: 2025-01-25

## 2025-01-25 RX ORDER — NICOTINE POLACRILEX 4 MG/1
1 LOZENGE ORAL EVERY 24 HOURS
Refills: 0 | Status: DISCONTINUED | OUTPATIENT
Start: 2025-01-25 | End: 2025-01-27

## 2025-01-25 RX ORDER — NICOTINE POLACRILEX 4 MG/1
4 LOZENGE ORAL EVERY 12 HOURS
Refills: 0 | Status: DISCONTINUED | OUTPATIENT
Start: 2025-01-25 | End: 2025-01-27

## 2025-01-25 RX ADMIN — RIVAROXABAN 10 MILLIGRAM(S): 20 TABLET, FILM COATED ORAL at 11:36

## 2025-01-25 RX ADMIN — PIPERACILLIN SODIUM AND TAZOBACTAM SODIUM 25 GRAM(S): 2; 250 INJECTION, POWDER, FOR SOLUTION INTRAVENOUS at 17:54

## 2025-01-25 RX ADMIN — VANCOMYCIN HYDROCHLORIDE 300 MILLIGRAM(S): KIT at 16:02

## 2025-01-25 RX ADMIN — NICOTINE POLACRILEX 1 PATCH: 4 LOZENGE ORAL at 06:44

## 2025-01-25 RX ADMIN — SULFAMETHOXAZOLE AND TRIMETHOPRIM 1 TABLET(S): 400; 80 TABLET ORAL at 11:35

## 2025-01-25 RX ADMIN — IBUPROFEN 400 MILLIGRAM(S): 600 TABLET, FILM COATED ORAL at 22:40

## 2025-01-25 RX ADMIN — BICTEGRAVIR SODIUM, EMTRICITABINE, AND TENOFOVIR ALAFENAMIDE FUMARATE 1 TABLET(S): 50; 200; 25 TABLET ORAL at 11:35

## 2025-01-25 RX ADMIN — NICOTINE POLACRILEX 1 PATCH: 4 LOZENGE ORAL at 20:51

## 2025-01-25 RX ADMIN — NICOTINE POLACRILEX 1 PATCH: 4 LOZENGE ORAL at 11:36

## 2025-01-25 RX ADMIN — IBUPROFEN 400 MILLIGRAM(S): 600 TABLET, FILM COATED ORAL at 21:40

## 2025-01-25 RX ADMIN — NICOTINE POLACRILEX 1 PATCH: 4 LOZENGE ORAL at 11:35

## 2025-01-25 RX ADMIN — PIPERACILLIN SODIUM AND TAZOBACTAM SODIUM 25 GRAM(S): 2; 250 INJECTION, POWDER, FOR SOLUTION INTRAVENOUS at 09:40

## 2025-01-25 NOTE — PROGRESS NOTE ADULT - SUBJECTIVE AND OBJECTIVE BOX
Patient is a 54y old  Male who presents with a chief complaint of LLE cellulitis (23 Jan 2025 23:23)    OVERNIGHT EVENTS: wyatt    SUBJECTIVE: seen at bedside this AM. patient has no acute complaints nor pain. he has no pain in his right leg despite the obvious erythema and swelling, and he states that he never actually had pain in this leg. ROS is otherwise negative.       ROS: otherwise negative      Vital Signs Last 24 Hrs  T(C): 36.3 (25 Jan 2025 12:49), Max: 36.6 (24 Jan 2025 21:00)  T(F): 97.4 (25 Jan 2025 12:49), Max: 97.9 (24 Jan 2025 21:00)  HR: 76 (25 Jan 2025 12:49) (76 - 76)  BP: 133/86 (25 Jan 2025 12:49) (118/72 - 133/86)  BP(mean): --  RR: 18 (25 Jan 2025 12:49) (17 - 18)  SpO2: 97% (25 Jan 2025 12:49) (97% - 99%)    Parameters below as of 25 Jan 2025 12:49  Patient On (Oxygen Delivery Method): room air            PHYSICAL EXAM:  Constitutional: resting comfortably in bed; NAD  Head: NC/AT  Eyes: PERRL, EOMI, anicteric sclera  Respiratory: CTA B/L; no W/R/R, no retractions  Cardiac: +S1/S2; RRR; no M/R/G  Gastrointestinal: soft, NT/ND; no rebound or guarding; +BSx4  Extremities: LLE mildly swollen with circumfrential erythema up to mid shin, reduced from inked demarcations made on prior admission, no TTP, sensation, motor intact  Musculoskeletal: NROM x4; no joint swelling, tenderness or erythema  Vascular: 2+ radial, DP/PT pulses B/L  Neurologic: AAOx3; CNII-XII grossly intact; no focal deficits  Psychiatric: affect and characteristics of appearance, verbalizations, behaviors are appropriate    LABS:                          11.6   5.26  )-----------( 224      ( 24 Jan 2025 07:52 )             36.7   01-25    138  |  103  |  12  ----------------------------<  91  4.0   |  21[L]  |  1.10    Ca    9.0      25 Jan 2025 08:27  Phos  3.1     01-25  Mg     2.0     01-25             Urinalysis Basic - ( 24 Jan 2025 07:52 )    Color: x / Appearance: x / SG: x / pH: x  Gluc: 98 mg/dL / Ketone: x  / Bili: x / Urobili: x   Blood: x / Protein: x / Nitrite: x   Leuk Esterase: x / RBC: x / WBC x   Sq Epi: x / Non Sq Epi: x / Bacteria: x      CAPILLARY BLOOD GLUCOSE            Urinalysis Basic - ( 24 Jan 2025 07:52 )    Color: x / Appearance: x / SG: x / pH: x  Gluc: 98 mg/dL / Ketone: x  / Bili: x / Urobili: x   Blood: x / Protein: x / Nitrite: x   Leuk Esterase: x / RBC: x / WBC x   Sq Epi: x / Non Sq Epi: x / Bacteria: x        MEDICATIONS  (STANDING):  bictegravir 50 mG/emtricitabine 200 mG/tenofovir alafenamide 25 mG (BIKTARVY) 1 Tablet(s) Oral every 24 hours  piperacillin/tazobactam IVPB.. 4.5 Gram(s) IV Intermittent every 8 hours  rivaroxaban 10 milliGRAM(s) Oral <User Schedule>  trimethoprim  160 mG/sulfamethoxazole 800 mG 1 Tablet(s) Oral every 24 hours  vancomycin  IVPB 1250 milliGRAM(s) IV Intermittent every 12 hours    MEDICATIONS  (PRN):  acetaminophen     Tablet .. 650 milliGRAM(s) Oral every 6 hours PRN Temp greater or equal to 38C (100.4F), Mild Pain (1 - 3)      RADIOLOGY & ADDITIONAL TESTS: Reviewed

## 2025-01-25 NOTE — PROGRESS NOTE ADULT - ASSESSMENT
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  Podiatry consulted for L hallux wound. At the time of consult, VSS, no leukocytosis, DVT ruled out. X-rays reviewed. Erythema improving.  leukocytosis resolved. Concern for OM clinically. Wound Cx growing Pseudomonas.  Pending MR for further intervention.    Plan:  -c/w IV abx  -f/u MR to r/o OM  -Foot dressed with Betadine DSD  -WBAT  -Rest of care upto primary team    Podiatry to follow, plan d/w attending

## 2025-01-25 NOTE — PROGRESS NOTE ADULT - SUBJECTIVE AND OBJECTIVE BOX
Patient is a 54y old  Male who presents with a chief complaint of LLE cellulitis (24 Jan 2025 14:44)      INTERVAL HPI/ OVERNIGHT EVENTS  Pt seen bedside. Dressing C/D/I. No acute pedal complaints    LABS                        11.6   5.26  )-----------( 224      ( 24 Jan 2025 07:52 )             36.7     01-24    136  |  107  |  10  ----------------------------<  98  3.4[L]   |  19[L]  |  1.02    Ca    8.5      24 Jan 2025 07:52  Phos  3.1     01-24  Mg     1.9     01-24    TPro  8.0  /  Alb  2.9[L]  /  TBili  0.4  /  DBili  x   /  AST  32  /  ALT  13  /  AlkPhos  102  01-23    PT/INR - ( 23 Jan 2025 17:07 )   PT: 14.8 sec;   INR: 1.27              ICU Vital Signs Last 24 Hrs  T(C): 36.3 (25 Jan 2025 05:11), Max: 36.8 (24 Jan 2025 13:40)  T(F): 97.4 (25 Jan 2025 05:11), Max: 98.3 (24 Jan 2025 13:40)  HR: 76 (25 Jan 2025 05:11) (76 - 77)  BP: 118/72 (25 Jan 2025 05:11) (109/73 - 122/76)  BP(mean): --  ABP: --  ABP(mean): --  RR: 17 (25 Jan 2025 05:11) (17 - 18)  SpO2: 99% (25 Jan 2025 05:11) (97% - 99%)    O2 Parameters below as of 25 Jan 2025 05:11  Patient On (Oxygen Delivery Method): room air            RADIOLOGY  < from: Xray Foot AP + Lateral + Oblique, Left (01.22.25 @ 12:40) >    IMPRESSION:  Hallux soft tissue swelling with focal plantar ulceration. Laterally   subluxed hallux IP joint with eroded articular margins indeterminate for   infection/septic versus inflammatory arthritic process. No tracking gas   collections or additional suspected areas of osteomyelitis; MRI could be   obtained to further assess as warranted. Redemonstrated well marginated   erosive changes along lateral 5th IP joint margin.    Stable partial 3rd ray amputation defect through distal metatarsal shaft   with tapered smoothly corticated stump margin and preserved overlying   soft tissue coverage.    No acute fractures or dislocations.    Tarsometatarsal alignment maintained without evidence for a Lisfranc   injury.    2nd hammertoe deformity. Preserved remaining joint spaces and no   additional joint margin erosions.    Tiny plantar calcaneal enthesophyte. Unremarkable distal Achilles tendon   shadow.    No discrete lytic or blastic lesions.    --- End of Report ---    < end of copied text >        MICROBIOLOGY    PHYSICAL EXAM    Left Lower Extremity Focused:  Vasc: 2/4 PT and DP, CFT wnl, TG warm to warm, edema to forefoot, erythema improving to the leg  Derm: Left hallux plantar distal wound -PTB, mild fluctuance, erythema samantha wound, no malodor, no drainage upon manual pressure  Neuro: Protective sensations intact  MSK: s/p R 3rd digit amp

## 2025-01-25 NOTE — PROGRESS NOTE ADULT - PROBLEM SELECTOR PLAN 4
Patient diagnosed in 1988 but poorly compliant on antiviral therapy and prophylaxis. Last VL undetectable w/ CD4 182 both as of Jul '24 however patient not currently taking Biktarvy and Bactrim since Oct '24 due to feeling depressed. Only report of opportunistic infection/AIDS defining illness has been PJP many years ago. 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 Patient follows w/ PCP in [facility].     - c/w Biktarvy and Bactrim for PCP ppx, per patient he takes them daily at 11AM   - f/u AM VL and CD4

## 2025-01-25 NOTE — PROGRESS NOTE ADULT - PROBLEM SELECTOR PLAN 3
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   X Ray L foot 11/21 per report shows Hallux soft tissue swelling with focal plantar ulceration. IP joint with eroded articular margins indeterminate for infection/septic versus inflammatory arthritic process.  Repeat XRay Hallux soft tissue swelling with focal plantar ulceration. Laterally subluxed hallux IP joint with eroded articular margins indeterminate for infection/septic versus inflammatory arthritic process. No tracking gas collections or additional suspected areas of osteomyelitis.     - f/u podiatry recs  - f/u MRI L foot to rule out OM as per podiatry recs from prior admission (1/22/25)  - f/u deep wound culture

## 2025-01-25 NOTE — PROGRESS NOTE ADULT - PROBLEM SELECTOR PLAN 1
Patient with evidence of LLE cellulitis with multiple admissions in the past for a similar presentation, most recently admitted on 1/22/25, left AMA a few hours later (states he was anxious because he lost his flight and hotel). 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 in 2023 cellulitis in same site however patient AMA'd at that time. Physical exam shows fluctuance over foot and going up leg suggesting underlying abscess   s/p vanc 1250mg and zosyn 3.375g in the ED     - c/w vanc 1250mg BID   - increase zosyn 3.375g q8hrs to 4.5g q8 for pseudomonal coverage given wound culture from prior admission with this bacteria  - vanc trough prior to 4th dose

## 2025-01-25 NOTE — SBIRT NOTE ADULT - NSSBIRTUNABLESCROTHER_GEN_A_CORE
Patient reports that he does not drink alcohol and that he only uses THC "rarely", noting that he last smoked two weeks ago.

## 2025-01-26 LAB
ALBUMIN SERPL ELPH-MCNC: 3.4 G/DL — SIGNIFICANT CHANGE UP (ref 3.3–5)
ALP SERPL-CCNC: 97 U/L — SIGNIFICANT CHANGE UP (ref 40–120)
ALT FLD-CCNC: 10 U/L — SIGNIFICANT CHANGE UP (ref 10–45)
ANION GAP SERPL CALC-SCNC: 10 MMOL/L — SIGNIFICANT CHANGE UP (ref 5–17)
AST SERPL-CCNC: 14 U/L — SIGNIFICANT CHANGE UP (ref 10–40)
BASOPHILS # BLD AUTO: 0.04 K/UL — SIGNIFICANT CHANGE UP (ref 0–0.2)
BASOPHILS NFR BLD AUTO: 0.6 % — SIGNIFICANT CHANGE UP (ref 0–2)
BILIRUB SERPL-MCNC: 0.2 MG/DL — SIGNIFICANT CHANGE UP (ref 0.2–1.2)
BUN SERPL-MCNC: 14 MG/DL — SIGNIFICANT CHANGE UP (ref 7–23)
CALCIUM SERPL-MCNC: 9.2 MG/DL — SIGNIFICANT CHANGE UP (ref 8.4–10.5)
CHLORIDE SERPL-SCNC: 104 MMOL/L — SIGNIFICANT CHANGE UP (ref 96–108)
CO2 SERPL-SCNC: 23 MMOL/L — SIGNIFICANT CHANGE UP (ref 22–31)
CREAT SERPL-MCNC: 1.24 MG/DL — SIGNIFICANT CHANGE UP (ref 0.5–1.3)
CRP SERPL-MCNC: 10.3 MG/L — HIGH (ref 0–4)
EGFR: 68 ML/MIN/1.73M2 — SIGNIFICANT CHANGE UP
EOSINOPHIL # BLD AUTO: 0.23 K/UL — SIGNIFICANT CHANGE UP (ref 0–0.5)
EOSINOPHIL NFR BLD AUTO: 3.6 % — SIGNIFICANT CHANGE UP (ref 0–6)
ERYTHROCYTE [SEDIMENTATION RATE] IN BLOOD: 75 MM/HR — HIGH
GLUCOSE SERPL-MCNC: 90 MG/DL — SIGNIFICANT CHANGE UP (ref 70–99)
HCT VFR BLD CALC: 41.3 % — SIGNIFICANT CHANGE UP (ref 39–50)
HGB BLD-MCNC: 13.1 G/DL — SIGNIFICANT CHANGE UP (ref 13–17)
IMM GRANULOCYTES NFR BLD AUTO: 0.9 % — SIGNIFICANT CHANGE UP (ref 0–0.9)
LYMPHOCYTES # BLD AUTO: 2.25 K/UL — SIGNIFICANT CHANGE UP (ref 1–3.3)
LYMPHOCYTES # BLD AUTO: 34.9 % — SIGNIFICANT CHANGE UP (ref 13–44)
MAGNESIUM SERPL-MCNC: 2 MG/DL — SIGNIFICANT CHANGE UP (ref 1.6–2.6)
MCHC RBC-ENTMCNC: 27.5 PG — SIGNIFICANT CHANGE UP (ref 27–34)
MCHC RBC-ENTMCNC: 31.7 G/DL — LOW (ref 32–36)
MCV RBC AUTO: 86.8 FL — SIGNIFICANT CHANGE UP (ref 80–100)
MONOCYTES # BLD AUTO: 0.45 K/UL — SIGNIFICANT CHANGE UP (ref 0–0.9)
MONOCYTES NFR BLD AUTO: 7 % — SIGNIFICANT CHANGE UP (ref 2–14)
NEUTROPHILS # BLD AUTO: 3.41 K/UL — SIGNIFICANT CHANGE UP (ref 1.8–7.4)
NEUTROPHILS NFR BLD AUTO: 53 % — SIGNIFICANT CHANGE UP (ref 43–77)
NRBC # BLD: 0 /100 WBCS — SIGNIFICANT CHANGE UP (ref 0–0)
NRBC BLD-RTO: 0 /100 WBCS — SIGNIFICANT CHANGE UP (ref 0–0)
PHOSPHATE SERPL-MCNC: 3.5 MG/DL — SIGNIFICANT CHANGE UP (ref 2.5–4.5)
PLATELET # BLD AUTO: 302 K/UL — SIGNIFICANT CHANGE UP (ref 150–400)
POTASSIUM SERPL-MCNC: 3.9 MMOL/L — SIGNIFICANT CHANGE UP (ref 3.5–5.3)
POTASSIUM SERPL-SCNC: 3.9 MMOL/L — SIGNIFICANT CHANGE UP (ref 3.5–5.3)
PROT SERPL-MCNC: 7.9 G/DL — SIGNIFICANT CHANGE UP (ref 6–8.3)
RBC # BLD: 4.76 M/UL — SIGNIFICANT CHANGE UP (ref 4.2–5.8)
RBC # FLD: 13.9 % — SIGNIFICANT CHANGE UP (ref 10.3–14.5)
SODIUM SERPL-SCNC: 137 MMOL/L — SIGNIFICANT CHANGE UP (ref 135–145)
WBC # BLD: 6.44 K/UL — SIGNIFICANT CHANGE UP (ref 3.8–10.5)
WBC # FLD AUTO: 6.44 K/UL — SIGNIFICANT CHANGE UP (ref 3.8–10.5)

## 2025-01-26 PROCEDURE — 99233 SBSQ HOSP IP/OBS HIGH 50: CPT | Mod: GC

## 2025-01-26 RX ORDER — PANTOPRAZOLE 20 MG/1
40 TABLET, DELAYED RELEASE ORAL
Refills: 0 | Status: DISCONTINUED | OUTPATIENT
Start: 2025-01-26 | End: 2025-01-27

## 2025-01-26 RX ORDER — ACETAMINOPHEN, DIPHENHYDRAMINE HCL, PHENYLEPHRINE HCL 325; 25; 5 MG/1; MG/1; MG/1
3 TABLET ORAL AT BEDTIME
Refills: 0 | Status: DISCONTINUED | OUTPATIENT
Start: 2025-01-26 | End: 2025-01-27

## 2025-01-26 RX ADMIN — BICTEGRAVIR SODIUM, EMTRICITABINE, AND TENOFOVIR ALAFENAMIDE FUMARATE 1 TABLET(S): 50; 200; 25 TABLET ORAL at 12:11

## 2025-01-26 RX ADMIN — NICOTINE POLACRILEX 1 PATCH: 4 LOZENGE ORAL at 19:07

## 2025-01-26 RX ADMIN — NICOTINE POLACRILEX 1 PATCH: 4 LOZENGE ORAL at 20:00

## 2025-01-26 RX ADMIN — NICOTINE POLACRILEX 1 PATCH: 4 LOZENGE ORAL at 13:26

## 2025-01-26 RX ADMIN — NICOTINE POLACRILEX 1 PATCH: 4 LOZENGE ORAL at 07:05

## 2025-01-26 RX ADMIN — RIVAROXABAN 10 MILLIGRAM(S): 20 TABLET, FILM COATED ORAL at 12:10

## 2025-01-26 RX ADMIN — SULFAMETHOXAZOLE AND TRIMETHOPRIM 1 TABLET(S): 400; 80 TABLET ORAL at 12:10

## 2025-01-26 RX ADMIN — NICOTINE POLACRILEX 1 PATCH: 4 LOZENGE ORAL at 18:26

## 2025-01-26 NOTE — PROGRESS NOTE ADULT - ASSESSMENT
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  Podiatry consulted for L hallux wound. At the time of consult, VSS, no leukocytosis, DVT ruled out. X-rays reviewed. Erythema improving.  leukocytosis resolved. Concern for OM clinically. Wound Cx growing Pseudomonas.     1/26: MR confirms OM of distal hallux with concern of septic IPJ joint. Spoke to pt bedside about MR results. Pt refusing amputation at this point and is amenable for IV abx and Bone Biopsy. Pt frustrated that he has to stay and wait for Bone biopsy results and PICC. Threatened to leave AMA and come back after results are back. Explained to pt this will be against the medical advice. Pt understands the risks. Plan for bone biopsy 1/26    Plan:  -Please stop IV abx until after Bone Biopsy 1/26 AM  -pt does not need to be NPO  -Foot dressed with Betadine DSD  -WBAT  -Rest of care upto primary team    Podiatry to follow, plan d/w attending

## 2025-01-26 NOTE — PROGRESS NOTE ADULT - SUBJECTIVE AND OBJECTIVE BOX
OVERNIGHT EVENTS:    SUBJECTIVE / INTERVAL HPI: Patient seen and examined at bedside.     VITAL SIGNS:  Vital Signs Last 24 Hrs  T(C): 36.7 (26 Jan 2025 05:05), Max: 36.9 (25 Jan 2025 20:45)  T(F): 98.1 (26 Jan 2025 05:05), Max: 98.5 (25 Jan 2025 20:45)  HR: 73 (26 Jan 2025 05:05) (73 - 84)  BP: 117/74 (26 Jan 2025 05:05) (117/74 - 133/86)  BP(mean): --  RR: 18 (26 Jan 2025 05:05) (18 - 18)  SpO2: 97% (26 Jan 2025 05:05) (95% - 97%)    Parameters below as of 26 Jan 2025 05:05  Patient On (Oxygen Delivery Method): room air      I&O's Summary      PHYSICAL EXAM:    General: WDWN  HEENT: NC/AT; PERRL, anicteric sclera; MMM  Neck: supple  Cardiovascular: +S1/S2; RRR  Respiratory: CTA B/L; no W/R/R  Gastrointestinal: soft, NT/ND; +BSx4  Extremities: WWP; no edema, clubbing or cyanosis  Vascular: 2+ radial, DP/PT pulses B/L  Neurological: AAOx3; no focal deficits    MEDICATIONS:  MEDICATIONS  (STANDING):  bictegravir 50 mG/emtricitabine 200 mG/tenofovir alafenamide 25 mG (BIKTARVY) 1 Tablet(s) Oral every 24 hours  nicotine - 21 mG/24Hr(s) Patch 1 Patch Transdermal every 24 hours  rivaroxaban 10 milliGRAM(s) Oral <User Schedule>  trimethoprim  160 mG/sulfamethoxazole 800 mG 1 Tablet(s) Oral every 24 hours    MEDICATIONS  (PRN):  acetaminophen     Tablet .. 650 milliGRAM(s) Oral every 6 hours PRN Temp greater or equal to 38C (100.4F), Mild Pain (1 - 3)  nicotine  Polacrilex Gum 4 milliGRAM(s) Oral every 12 hours PRN Smoking Cessation      ALLERGIES:  Allergies    cefepime (Unknown)    Intolerances        LABS:                        13.1   6.44  )-----------( 302      ( 26 Jan 2025 05:30 )             41.3     01-25    138  |  103  |  12  ----------------------------<  91  4.0   |  21[L]  |  1.10    Ca    9.0      25 Jan 2025 08:27  Phos  3.1     01-25  Mg     2.0     01-25        Urinalysis Basic - ( 25 Jan 2025 08:27 )    Color: x / Appearance: x / SG: x / pH: x  Gluc: 91 mg/dL / Ketone: x  / Bili: x / Urobili: x   Blood: x / Protein: x / Nitrite: x   Leuk Esterase: x / RBC: x / WBC x   Sq Epi: x / Non Sq Epi: x / Bacteria: x      CAPILLARY BLOOD GLUCOSE          RADIOLOGY & ADDITIONAL TESTS: Reviewed.   OVERNIGHT EVENTS: wyatt    SUBJECTIVE / INTERVAL HPI: Patient seen and examined at bedside.  Patient seen and examined at bedside. Pt reports he is feeling well this AM. ROS otherwise negative.     VITAL SIGNS:  Vital Signs Last 24 Hrs  T(C): 36.7 (26 Jan 2025 05:05), Max: 36.9 (25 Jan 2025 20:45)  T(F): 98.1 (26 Jan 2025 05:05), Max: 98.5 (25 Jan 2025 20:45)  HR: 73 (26 Jan 2025 05:05) (73 - 84)  BP: 117/74 (26 Jan 2025 05:05) (117/74 - 133/86)  BP(mean): --  RR: 18 (26 Jan 2025 05:05) (18 - 18)  SpO2: 97% (26 Jan 2025 05:05) (95% - 97%)    Parameters below as of 26 Jan 2025 05:05  Patient On (Oxygen Delivery Method): room air      I&O's Summary      PHYSICAL EXAM:  Constitutional: resting comfortably in bed; NAD  Head: NC/AT  Eyes: PERRL, EOMI, anicteric sclera  Respiratory: CTA B/L; no W/R/R, no retractions  Cardiac: +S1/S2; RRR; no M/R/G  Gastrointestinal: soft, NT/ND; no rebound or guarding; +BSx4  Extremities: LLE mildly swollen with circumfrential erythema up to mid shin, reduced from inked demarcations made on prior admission, no TTP, sensation, motor intact  Musculoskeletal: NROM x4; no joint swelling, tenderness or erythema  Vascular: 2+ radial, DP/PT pulses B/L  Neurologic: AAOx3; CNII-XII grossly intact; no focal deficits  Psychiatric: affect and characteristics of appearance, verbalizations, behaviors are appropriate      MEDICATIONS:  MEDICATIONS  (STANDING):  bictegravir 50 mG/emtricitabine 200 mG/tenofovir alafenamide 25 mG (BIKTARVY) 1 Tablet(s) Oral every 24 hours  nicotine - 21 mG/24Hr(s) Patch 1 Patch Transdermal every 24 hours  rivaroxaban 10 milliGRAM(s) Oral <User Schedule>  trimethoprim  160 mG/sulfamethoxazole 800 mG 1 Tablet(s) Oral every 24 hours    MEDICATIONS  (PRN):  acetaminophen     Tablet .. 650 milliGRAM(s) Oral every 6 hours PRN Temp greater or equal to 38C (100.4F), Mild Pain (1 - 3)  nicotine  Polacrilex Gum 4 milliGRAM(s) Oral every 12 hours PRN Smoking Cessation      ALLERGIES:  Allergies    cefepime (Unknown)    Intolerances        LABS:                        13.1   6.44  )-----------( 302      ( 26 Jan 2025 05:30 )             41.3     01-25    138  |  103  |  12  ----------------------------<  91  4.0   |  21[L]  |  1.10    Ca    9.0      25 Jan 2025 08:27  Phos  3.1     01-25  Mg     2.0     01-25        Urinalysis Basic - ( 25 Jan 2025 08:27 )    Color: x / Appearance: x / SG: x / pH: x  Gluc: 91 mg/dL / Ketone: x  / Bili: x / Urobili: x   Blood: x / Protein: x / Nitrite: x   Leuk Esterase: x / RBC: x / WBC x   Sq Epi: x / Non Sq Epi: x / Bacteria: x      CAPILLARY BLOOD GLUCOSE          RADIOLOGY & ADDITIONAL TESTS: Reviewed.

## 2025-01-26 NOTE — PROGRESS NOTE ADULT - PROBLEM SELECTOR PLAN 1
Patient with evidence of LLE cellulitis with multiple admissions in the past for a similar presentation, most recently admitted on 1/22/25, left AMA a few hours later (states he was anxious because he lost his flight and hotel). 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 in 2023 cellulitis in same site however patient AMA'd at that time. Physical exam shows fluctuance over foot and going up leg suggesting underlying abscess   s/p vanc 1250mg and zosyn 3.375g in the ED     - c/w vanc 1250mg BID   - increase zosyn 3.375g q8hrs to 4.5g q8 for pseudomonal coverage given wound culture from prior admission with this bacteria  - vanc trough prior to 4th dose Patient with evidence of LLE cellulitis with multiple admissions in the past for a similar presentation, most recently admitted on 1/22/25, left AMA a few hours later (states he was anxious because he lost his flight and hotel). Patient has had an extensive history of cellulitis in the past w/ reported LLE 3rd digit OM s/p amputation and previous admission to [facility] in 2023 cellulitis in same site however patient AMA'd at that time. Physical exam shows fluctuance over foot and going up leg suggesting underlying abscess   s/p vanc 1250mg and zosyn 3.375g in the ED   -pending bone biopsy tomorrow  - c/w vanc 1250mg BID, zosyn 4.5g q8 for pseudomonal coverage given wound culture from prior admission with this bacteria after bone biopsy tomorrow  - vanc trough prior to 4th dose

## 2025-01-26 NOTE — PROGRESS NOTE ADULT - PROBLEM SELECTOR PLAN 3
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   X Ray L foot 11/21 per report shows Hallux soft tissue swelling with focal plantar ulceration. IP joint with eroded articular margins indeterminate for infection/septic versus inflammatory arthritic process.  Repeat XRay Hallux soft tissue swelling with focal plantar ulceration. Laterally subluxed hallux IP joint with eroded articular margins indeterminate for infection/septic versus inflammatory arthritic process. No tracking gas collections or additional suspected areas of osteomyelitis.     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   X Ray L foot 11/21 per report shows Hallux soft tissue swelling with focal plantar ulceration. IP joint with eroded articular margins indeterminate for infection/septic versus inflammatory arthritic process.  Repeat XRay Hallux soft tissue swelling with focal plantar ulceration. Laterally subluxed hallux IP joint with eroded articular margins indeterminate for infection/septic versus inflammatory arthritic process. No tracking gas collections or additional suspected areas of osteomyelitis.   MR L foot: acute osteomyelitis involving the first distal phalanx and the first proximal phalangeal head, with probable intervening septic arthritis at the first interphalangeal joint. Probable surrounding cellulitis without visualized abscess.  Probable 3 cm infiltrative fibroma within the plantar soft tissues deep to the first metatarsal shaft.    - f/u podiatry recs  - f/u deep wound culture

## 2025-01-26 NOTE — PROGRESS NOTE ADULT - SUBJECTIVE AND OBJECTIVE BOX
Patient is a 54y old  Male who presents with a chief complaint of LLE cellulitis (26 Jan 2025 06:09)      INTERVAL HPI/ OVERNIGHT EVENTS  pt seen bedside, Dressing C/D/I    LABS                        13.1   6.44  )-----------( 302      ( 26 Jan 2025 05:30 )             41.3     01-26    137  |  104  |  14  ----------------------------<  90  3.9   |  23  |  1.24    Ca    9.2      26 Jan 2025 05:30  Phos  3.5     01-26  Mg     2.0     01-26    TPro  7.9  /  Alb  3.4  /  TBili  0.2  /  DBili  x   /  AST  14  /  ALT  10  /  AlkPhos  97  01-26      ESR: 75  CRP: --  01-26 @ 05:30    ICU Vital Signs Last 24 Hrs  T(C): 36.7 (26 Jan 2025 05:05), Max: 36.9 (25 Jan 2025 20:45)  T(F): 98.1 (26 Jan 2025 05:05), Max: 98.5 (25 Jan 2025 20:45)  HR: 73 (26 Jan 2025 05:05) (73 - 84)  BP: 117/74 (26 Jan 2025 05:05) (117/74 - 133/86)  BP(mean): --  ABP: --  ABP(mean): --  RR: 18 (26 Jan 2025 05:05) (18 - 18)  SpO2: 97% (26 Jan 2025 05:05) (95% - 97%)    O2 Parameters below as of 26 Jan 2025 05:05  Patient On (Oxygen Delivery Method): room air            RADIOLOGY  < from: MR Foot w/wo IV Cont, Left (01.25.25 @ 16:21) >  Impression:    Findings compatible with acute osteomyelitis involving the first distal   phalanx and the first proximal phalangeal head, with probable intervening   septic arthritis at the first interphalangeal joint. Probable surrounding   cellulitis without visualized abscess.    Probable 3 cm infiltrative fibroma within theplantar soft tissues deep   to the first metatarsal shaft.    --- End of Report ---    < end of copied text >    MICROBIOLOGY  Culture - Wound Aerobic (01.22.25 @ 12:57)    -  Imipenem: S <=1   -  Levofloxacin: S <=0.5   -  Meropenem: S <=1   -  Piperacillin/Tazobactam: S <=8   -  Ciprofloxacin: S <=0.25   -  Amikacin: S <=16   -  Cefepime: S <=2   -  Ceftazidime: S 4   Specimen Source: Skin/Wound   Culture Results:   Culture yields growth of greater than 3 colony types of  bacteria,  which may indicate contamination and normal brian  Call client services within 7 days if further workup is clinically  indicated. Culture includes  Numerous Pseudomonas aeruginosa  Numerous Streptococcus dysgalactiae (Group C/G) "Susceptibilities not  performed"   Organism Identification: Pseudomonas aeruginosa   Organism: Pseudomonas aeruginosa   Method Type: TAYE      PHYSICAL EXAM  Left Lower Extremity Focused:  Vasc: 2/4 PT and DP, CFT wnl, TG warm to warm, edema to forefoot, erythema improving to the leg  Derm: Left hallux plantar distal wound -PTB, mild fluctuance, erythema samantha wound, no malodor, no drainage upon manual pressure  Neuro: Protective sensations intact  MSK: s/p R 3rd digit amp

## 2025-01-26 NOTE — PROGRESS NOTE ADULT - PROBLEM SELECTOR PLAN 4
Patient diagnosed in 1988 but poorly compliant on antiviral therapy and prophylaxis. Last VL undetectable w/ CD4 182 both as of Jul '24 however patient not currently taking Biktarvy and Bactrim since Oct '24 due to feeling depressed. Only report of opportunistic infection/AIDS defining illness has been PJP many years ago. 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 Patient follows w/ PCP in [facility].     - c/w Biktarvy and Bactrim for PCP ppx, per patient he takes them daily at 11AM   - f/u AM VL and CD4

## 2025-01-27 VITALS
HEART RATE: 80 BPM | RESPIRATION RATE: 18 BRPM | TEMPERATURE: 97 F | OXYGEN SATURATION: 95 % | SYSTOLIC BLOOD PRESSURE: 108 MMHG | DIASTOLIC BLOOD PRESSURE: 73 MMHG

## 2025-01-27 LAB
CULTURE RESULTS: SIGNIFICANT CHANGE UP
CULTURE RESULTS: SIGNIFICANT CHANGE UP
GRAM STN FLD: SIGNIFICANT CHANGE UP
SPECIMEN SOURCE: SIGNIFICANT CHANGE UP

## 2025-01-27 PROCEDURE — 87186 SC STD MICRODIL/AGAR DIL: CPT

## 2025-01-27 PROCEDURE — 85610 PROTHROMBIN TIME: CPT

## 2025-01-27 PROCEDURE — 84100 ASSAY OF PHOSPHORUS: CPT

## 2025-01-27 PROCEDURE — 85652 RBC SED RATE AUTOMATED: CPT

## 2025-01-27 PROCEDURE — A9585: CPT

## 2025-01-27 PROCEDURE — G0545: CPT

## 2025-01-27 PROCEDURE — 80202 ASSAY OF VANCOMYCIN: CPT

## 2025-01-27 PROCEDURE — 87070 CULTURE OTHR SPECIMN AEROBIC: CPT

## 2025-01-27 PROCEDURE — 86140 C-REACTIVE PROTEIN: CPT

## 2025-01-27 PROCEDURE — 86360 T CELL ABSOLUTE COUNT/RATIO: CPT

## 2025-01-27 PROCEDURE — 86359 T CELLS TOTAL COUNT: CPT

## 2025-01-27 PROCEDURE — 86357 NK CELLS TOTAL COUNT: CPT

## 2025-01-27 PROCEDURE — 88307 TISSUE EXAM BY PATHOLOGIST: CPT

## 2025-01-27 PROCEDURE — 96374 THER/PROPH/DIAG INJ IV PUSH: CPT

## 2025-01-27 PROCEDURE — 99222 1ST HOSP IP/OBS MODERATE 55: CPT

## 2025-01-27 PROCEDURE — 87536 HIV-1 QUANT&REVRSE TRNSCRPJ: CPT

## 2025-01-27 PROCEDURE — 80048 BASIC METABOLIC PNL TOTAL CA: CPT

## 2025-01-27 PROCEDURE — 88307 TISSUE EXAM BY PATHOLOGIST: CPT | Mod: 26

## 2025-01-27 PROCEDURE — 73720 MRI LWR EXTREMITY W/O&W/DYE: CPT | Mod: MC

## 2025-01-27 PROCEDURE — 86355 B CELLS TOTAL COUNT: CPT

## 2025-01-27 PROCEDURE — 85025 COMPLETE CBC W/AUTO DIFF WBC: CPT

## 2025-01-27 PROCEDURE — 99239 HOSP IP/OBS DSCHRG MGMT >30: CPT

## 2025-01-27 PROCEDURE — 80053 COMPREHEN METABOLIC PANEL: CPT

## 2025-01-27 PROCEDURE — 88311 DECALCIFY TISSUE: CPT | Mod: 26

## 2025-01-27 PROCEDURE — 87075 CULTR BACTERIA EXCEPT BLOOD: CPT

## 2025-01-27 PROCEDURE — 88311 DECALCIFY TISSUE: CPT

## 2025-01-27 PROCEDURE — 87077 CULTURE AEROBIC IDENTIFY: CPT

## 2025-01-27 PROCEDURE — 83735 ASSAY OF MAGNESIUM: CPT

## 2025-01-27 PROCEDURE — 99285 EMERGENCY DEPT VISIT HI MDM: CPT | Mod: 25

## 2025-01-27 PROCEDURE — 36415 COLL VENOUS BLD VENIPUNCTURE: CPT

## 2025-01-27 RX ORDER — VANCOMYCIN HYDROCHLORIDE 50 MG/ML
1250 KIT ORAL EVERY 12 HOURS
Refills: 0 | Status: DISCONTINUED | OUTPATIENT
Start: 2025-01-27 | End: 2025-01-27

## 2025-01-27 RX ORDER — PIPERACILLIN SODIUM AND TAZOBACTAM SODIUM 2; 250 G/50ML; MG/50ML
4.5 INJECTION, POWDER, FOR SOLUTION INTRAVENOUS ONCE
Refills: 0 | Status: COMPLETED | OUTPATIENT
Start: 2025-01-27 | End: 2025-01-27

## 2025-01-27 RX ORDER — PIPERACILLIN SODIUM AND TAZOBACTAM SODIUM 2; 250 G/50ML; MG/50ML
4.5 INJECTION, POWDER, FOR SOLUTION INTRAVENOUS EVERY 8 HOURS
Refills: 0 | Status: DISCONTINUED | OUTPATIENT
Start: 2025-01-27 | End: 2025-01-27

## 2025-01-27 RX ADMIN — RIVAROXABAN 10 MILLIGRAM(S): 20 TABLET, FILM COATED ORAL at 10:26

## 2025-01-27 RX ADMIN — PANTOPRAZOLE 40 MILLIGRAM(S): 20 TABLET, DELAYED RELEASE ORAL at 06:33

## 2025-01-27 RX ADMIN — PIPERACILLIN SODIUM AND TAZOBACTAM SODIUM 25 GRAM(S): 2; 250 INJECTION, POWDER, FOR SOLUTION INTRAVENOUS at 14:49

## 2025-01-27 RX ADMIN — BICTEGRAVIR SODIUM, EMTRICITABINE, AND TENOFOVIR ALAFENAMIDE FUMARATE 1 TABLET(S): 50; 200; 25 TABLET ORAL at 10:26

## 2025-01-27 RX ADMIN — PIPERACILLIN SODIUM AND TAZOBACTAM SODIUM 200 GRAM(S): 2; 250 INJECTION, POWDER, FOR SOLUTION INTRAVENOUS at 10:32

## 2025-01-27 RX ADMIN — ACETAMINOPHEN 650 MILLIGRAM(S): 160 SUSPENSION ORAL at 13:34

## 2025-01-27 RX ADMIN — NICOTINE POLACRILEX 1 PATCH: 4 LOZENGE ORAL at 07:05

## 2025-01-27 RX ADMIN — VANCOMYCIN HYDROCHLORIDE 166.67 MILLIGRAM(S): KIT at 12:50

## 2025-01-27 RX ADMIN — ACETAMINOPHEN 650 MILLIGRAM(S): 160 SUSPENSION ORAL at 13:04

## 2025-01-27 RX ADMIN — ACETAMINOPHEN, DIPHENHYDRAMINE HCL, PHENYLEPHRINE HCL 3 MILLIGRAM(S): 325; 25; 5 TABLET ORAL at 00:03

## 2025-01-27 RX ADMIN — SULFAMETHOXAZOLE AND TRIMETHOPRIM 1 TABLET(S): 400; 80 TABLET ORAL at 10:26

## 2025-01-27 NOTE — PROGRESS NOTE ADULT - PROBLEM SELECTOR PLAN 1
Patient with evidence of LLE cellulitis with multiple admissions in the past for a similar presentation, most recently admitted on 1/22/25, left AMA a few hours later (states he was anxious because he lost his flight and hotel). Patient has had an extensive history of cellulitis in the past w/ reported LLE 3rd digit OM s/p amputation and previous admission to [facility] in 2023 cellulitis in same site however patient AMA'd at that time. Physical exam shows fluctuance over foot and going up leg suggesting underlying abscess   s/p vanc 1250mg and zosyn 3.375g in the ED   -pending bone biopsy tomorrow  - c/w vanc 1250mg BID, zosyn 4.5g q8 for pseudomonal coverage given wound culture from prior admission with this bacteria after bone biopsy tomorrow  - vanc trough prior to 4th dose acute OM of 1st distal phalanx 2/2 skin ulcer, with cellulitis and with possible septic arthritis of interphalangeal joint, all per MRI from 1/25. pseudomonas, staph a, enterobacter per wound cx from 1/22. clinically VSS with minimal pain and improving erythema. now s/p bone biopsy today by podiatry.     - vanc, pseudomonal zosyn  - ID consult  - f/u bone biopsy cx acute OM of 1st distal phalanx 2/2 skin ulcer, with cellulitis and with possible septic arthritis of interphalangeal joint, all per MRI from 1/25. pseudomonas per wound cx from 1/22. clinically VSS with minimal pain and improving erythema. now s/p bone biopsy today by podiatry.     - vanc, pseudomonal zosyn  - ID consult  - f/u bone biopsy cx

## 2025-01-27 NOTE — CONSULT NOTE ADULT - ASSESSMENT
IMPRESSION:  Acute osteomyelitis of the left first toe.  Deep wound cultures with Strep dysgalactiae and Pseudomonas aeruginosa (both in numerous amounts).  Would consider both of these pathogens for now. He is s/p bone biopsy with culture pending.    Recommend for acute osteomyelitis is 6 weeks of IV antibiotics, preferably culture-directed antibiotics.  Patient has indicated to me that he would like to leave the hospital to go to [place] to get "teeth" (dentures?) prior to beginning the IV antibiotics. I explained that I can not recommend this approach. I stated untreated acute osteomyelitis can lead to bacteremia/sepsis/death and can lead to chronic osteomyelitis and bone destruction. I recommended he remain here for proper treatment.  He prefers to go to [place] first    Recommend:  1.  Continue Vancomycin 1250 mg IV q12.  Check vanco trough before 4th dose (goal 13-18) - due around 12:30 am on 1/29  2.  Continue Zosyn 4.5 grams IV q8hrs via extended infusion  3.  Follow up bone biopsy cultures.  Will adjust antibiotics based on this  4.  Recommendation is 6 weeks of IV antibiotics.  If patient does wish to go to [place] before completing the course, then it should be against medical advice.  In this scenario, PO antibiotics are better than no antibiotics. In this scenario, I would discharge with Levaquin 750 mg PO qdaily + Augmentin 875 mg PO q12. I would only give one week as the results of the biopsy not available.  If patient does return from [place] and wishes for IV antibiotics, he can return to the hospital  5.  Continue biktarvy 1 tab PO daily and Bactrim 1DS PO daily for pcp ppx    ID team 3 will follow

## 2025-01-27 NOTE — CHART NOTE - NSCHARTNOTEFT_GEN_A_CORE
Called by nurse to bedside to evaluate patient due to patient wanting to sign out AMA. Asked patient why he wants to leave. Patient states that he needs to go to [place] to get dental work done.    Patient is AAOx3. I explained at length to the patient the risks of signing out AMA including but not limited to harm, injury or death related to his ongoing infections. I explained the risks, benefits and alternatives to treatment as well as the attendant risks of refusing treatment at this time. I offered to answer any questions and fully answered any such questions. I believe that the patient fully understands what has been explained and answered. I informed hospitalist Dr. Pino of this, aware. Patient signed form to sign out AMA and accepts responsibility for any and all results of this decision.    I explained to patient that we will be sending him with a course of oral antibiotics recommended by infectious disease, but these antibiotics will not be as effective as IV antibiotics, deepthi if they are not tailored to the infecting bacteria. I explained to patient that his antibiotics were sent to [facility] on 1st floor. I explained to patient that despite his desire to get dental work done in [place], it's possible that a dental clinic may not even want to do dental work on him if he has an active infection, but he was willing to take this risk. I recommended to him to stay for intravenous antibiotics, but as stated above, he was clearly willing to take the risks of leaving with only oral antibiotics. I recommended to patient to come back to the hospital or any hospital or ED if he starts feeling worse. He stated that he will try to come back to the hospital when he gets back from [place].

## 2025-01-27 NOTE — PROGRESS NOTE ADULT - PROBLEM SELECTOR PLAN 4
Patient diagnosed in 1988 but poorly compliant on antiviral therapy and prophylaxis. Last VL undetectable w/ CD4 182 both as of Jul '24 however patient not currently taking Biktarvy and Bactrim since Oct '24 due to feeling depressed. Only report of opportunistic infection/AIDS defining illness has been PJP many years ago. 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 Patient follows w/ PCP in [facility].     - c/w Biktarvy and Bactrim for PCP ppx, per patient he takes them daily at 11AM   - f/u AM VL and CD4 Patient diagnosed in 1988 but poorly compliant on antiviral therapy and prophylaxis. Last VL undetectable w/ CD4 182 both as of Jul '24 however patient not currently taking Biktarvy and Bactrim since Oct '24 due to feeling depressed. Only report of opportunistic infection/AIDS defining illness has been PJP many years ago. 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 Patient follows w/ PCP in [facility].   - HIV viral load 34, CD4 260    - c/w Biktarvy and Bactrim for PCP ppx, per patient he takes them daily at 11AM

## 2025-01-27 NOTE — PROGRESS NOTE ADULT - ASSESSMENT
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  Podiatry consulted for L hallux wound. At the time of consult, VSS, no leukocytosis, DVT ruled out. X-rays reviewed. Erythema improving.  leukocytosis resolved. Concern for OM clinically. Wound Cx growing Pseudomonas.     1/26: MR confirms OM of distal hallux with concern of septic IPJ joint. Spoke to pt bedside about MR results. Pt refusing amputation at this point and is amenable for IV abx and Bone Biopsy. Pt frustrated that he has to stay and wait for Bone biopsy results and PICC. Threatened to leave AMA and come back after results are back. Explained to pt this will be against the medical advice. Pt understands the risks. Plan for bone biopsy 1/26 1/27: Pt is s/p bedside L hallux distal phalanx     Plan:  -c/w broad spectrum IV abx  -f/u Bone Biopsy Cx/path  -Rec ID consult  -pt will need 6 weeks of IV Abx  -Foot dressed with Betadine DSD  -WBAT  -Rest of care upto primary team    Podiatry to follow, plan d/w attending   Patient is a 53 y/o M with pmhx of poorly controlled HIV/AIDs, multiple episodes of b/l LE cellulitis and OM, tobacco smoker, DVT/PE (on xarelto), antiphospholipid syndrome, secondary syphilis treated 1 year ago, peripheral neuropathy, GERD, and depression, who presented to the ED after leaving AMA yesterday, admitted for further treatment of known LLE cellulitis.  Podiatry consulted for L hallux wound. At the time of consult, VSS, no leukocytosis, DVT ruled out. X-rays reviewed. Erythema improving.  leukocytosis resolved. Concern for OM clinically. Wound Cx growing Pseudomonas.     1/26: MR confirms OM of distal hallux with concern of septic IPJ joint. Spoke to pt bedside about MR results. Pt refusing amputation at this point and is amenable for IV abx and Bone Biopsy. Pt frustrated that he has to stay and wait for Bone biopsy results and PICC. Threatened to leave AMA and come back after results are back. Explained to pt this will be against the medical advice. Pt understands the risks. Plan for bone biopsy 1/26 1/27: Pt is s/p bedside bone biopsy of L hallux distal phalanx     Plan:  -c/w broad spectrum IV abx  -f/u Bone Biopsy Cx/path  -Rec ID consult  -pt will need 6 weeks of IV Abx  -Foot dressed with Betadine DSD  -WBAT  -Rest of care upto primary team    Podiatry to follow, plan d/w attending

## 2025-01-27 NOTE — PROGRESS NOTE ADULT - SUBJECTIVE AND OBJECTIVE BOX
Patient is a 54y old  Male who presents with a chief complaint of LLE cellulitis (26 Jan 2025 10:38)      INTERVAL HPI/ OVERNIGHT EVENTS  pt seen bedside. pt is s/p bedside Bone biopsy 1/27. Dressing C/D/I    LABS                        13.1   6.44  )-----------( 302      ( 26 Jan 2025 05:30 )             41.3     01-26    137  |  104  |  14  ----------------------------<  90  3.9   |  23  |  1.24    Ca    9.2      26 Jan 2025 05:30  Phos  3.5     01-26  Mg     2.0     01-26    TPro  7.9  /  Alb  3.4  /  TBili  0.2  /  DBili  x   /  AST  14  /  ALT  10  /  AlkPhos  97  01-26        ICU Vital Signs Last 24 Hrs  T(C): 36.8 (27 Jan 2025 05:37), Max: 36.8 (26 Jan 2025 20:20)  T(F): 98.2 (27 Jan 2025 05:37), Max: 98.3 (26 Jan 2025 20:20)  HR: 68 (27 Jan 2025 05:37) (68 - 94)  BP: 107/71 (27 Jan 2025 05:37) (107/71 - 135/90)  BP(mean): 90 (26 Jan 2025 12:36) (90 - 90)  ABP: --  ABP(mean): --  RR: 18 (27 Jan 2025 05:37) (18 - 18)  SpO2: 95% (27 Jan 2025 05:37) (95% - 98%)    O2 Parameters below as of 27 Jan 2025 05:37  Patient On (Oxygen Delivery Method): room air            RADIOLOGY  < from: MR Foot w/wo IV Cont, Left (01.25.25 @ 16:21) >  Impression:    Findings compatible with acute osteomyelitis involving the first distal   phalanx and the first proximal phalangeal head, with probable intervening   septic arthritis at the first interphalangeal joint. Probable surrounding   cellulitis without visualized abscess.    Probable 3 cm infiltrative fibroma within theplantar soft tissues deep   to the first metatarsal shaft.    --- End of Report ---    < end of copied text >      MICROBIOLOGY    PHYSICAL EXAM  Left Lower Extremity Focused:  Vasc: 2/4 PT and DP, CFT wnl, TG warm to warm, edema to forefoot, erythema improving to the leg  Derm: Left hallux plantar distal wound -PTB, mild fluctuance, erythema samantha wound, no malodor, no drainage upon manual pressure  Neuro: Protective sensations intact  MSK: s/p R 3rd digit amp Statement Selected

## 2025-01-27 NOTE — PROGRESS NOTE ADULT - PROBLEM SELECTOR PLAN 6
F: none   E: replete as needed  N: Regular diet   DVT ppx: Xarelto 10mg qd  GI ppx: none     DISPO: [facility]
F: none   E: replete as needed  N: Regular diet   DVT ppx: Xarelto 10mg qd  GI ppx: none     DISPO: [facility]
F: none   E: replete as needed  N: Regular diet   DVT ppx: Xarelto 10mg qd  GI ppx: none     DISPO: [facility]

## 2025-01-27 NOTE — PROGRESS NOTE ADULT - PROBLEM SELECTOR PLAN 3
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   X Ray L foot 11/21 per report shows Hallux soft tissue swelling with focal plantar ulceration. IP joint with eroded articular margins indeterminate for infection/septic versus inflammatory arthritic process.  Repeat XRay Hallux soft tissue swelling with focal plantar ulceration. Laterally subluxed hallux IP joint with eroded articular margins indeterminate for infection/septic versus inflammatory arthritic process. No tracking gas collections or additional suspected areas of osteomyelitis.   MR L foot: acute osteomyelitis involving the first distal phalanx and the first proximal phalangeal head, with probable intervening septic arthritis at the first interphalangeal joint. Probable surrounding cellulitis without visualized abscess.  Probable 3 cm infiltrative fibroma within the plantar soft tissues deep to the first metatarsal shaft.    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   X Ray L foot 11/21 per report shows Hallux soft tissue swelling with focal plantar ulceration. IP joint with eroded articular margins indeterminate for infection/septic versus inflammatory arthritic process.  Repeat XRay Hallux soft tissue swelling with focal plantar ulceration. Laterally subluxed hallux IP joint with eroded articular margins indeterminate for infection/septic versus inflammatory arthritic process. No tracking gas collections or additional suspected areas of osteomyelitis.   MR L foot: acute osteomyelitis involving the first distal phalanx and the first proximal phalangeal head, with probable intervening septic arthritis at the first interphalangeal joint. Probable surrounding cellulitis without visualized abscess.  Probable 3 cm infiltrative fibroma within the plantar soft tissues deep to the first metatarsal shaft.    - f/u podiatry recs

## 2025-01-27 NOTE — PROGRESS NOTE ADULT - TIME BILLING
Evaluation of patient, review of charts, d/w RN
Evaluation of patient, review of charts, d/w RN
Bedside exam and interview   Reviewed vitals, labs   Discussed patient's plan of care with housestaff   Documentation of encounter  Excludes teaching and separately reported services

## 2025-01-27 NOTE — CONSULT NOTE ADULT - SUBJECTIVE AND OBJECTIVE BOX
Attending: Dr. Merrill    Patient is a 54y old  Male who presents with a chief complaint of LLE cellulitis (24 Jan 2025 09:21)      HPI:  Patient is a 53 y/o M with pmhx of DVT/PE (on xarelto; poorly compliant), HIV/AIDS (CD4 count 127 VLUD 11/2024,  poorly compliant w/ Biktarvy and ppx), b/l LE cellulitis and OM s/p L 3rd digit amputation 5 yrs ago, tobacco smoker (23pack yrs), antiphospholipid syndrome, secondary syphilis treated 1 year ago, peripheral neuropathy, GERD, and depression presented with SOB and worsening of known LLE cellulitis. Patient was previously admitted 1/22/25 for his LLE cellulitis but left AMA a few hours later due to not receiving anxiety medication. Patient was also previously admitted 11/22-11/23/2024 for same concern of LLE cellulitis r/o OM and AMA'd due to his neighbor making too much noise and never finished course of abx.    Sees podiatrist outpt, a Dr. Leyden, who was debriding the wound regularly and doing dressing changes utilizing either betadine or silver sulfadiazine ointment.        In the ED  Vitals: T 97.7, , /96, RR 18, SpO2 94% on RA   Labs: WBC 8.82 (was 16.91 on 1/21/25), Hgb 11.9, PT 14.8, IN 1.27, Cr 1.21 (baseline 1.06), albumin 2.9  Imaging none obtained on this presentation.   From 1/22/25: LLE doppler no evidence of LLE DVT  CT LLE: No soft tissue gas. Ulcers involving medial volar aspect of left great toe. A 2.4 cm complex subcutaneous collection at the volar aspect of first metatarsal. Diffuse cellulitis about left lower extremity. Moderate atherosclerotic calcification.  CT PE: No PE. Dilated main pulmonary artery suggests pulmonary arterial hypertension. Mild coronary atherosclerosis   Interventions: zosyn 3.375 and vanc 1250mg  (23 Jan 2025 23:23)      Review of systems negative except per HPI and as stated below  General:	 no weakness; no fevers, no chills  Skin/Breast: no rash  Respiratory and Thorax: no SOB, no cough  Cardiovascular:	No chest pain  Gastrointestinal:	 no nausea, vomiting , diarrhea  Genitourinary:	no dysuria, no difficulty urinating, no hematuria  Musculoskeletal:	no weakness, no joint swelling/pain  Neurological:	no focal weakness/numbness  Endocrine:	no polyuria, no polydipsia    PAST MEDICAL & SURGICAL HISTORY:  Human immunodeficiency virus (HIV) infection      Osteomyelitis      Pulmonary embolism      DVT, lower extremity      No significant past surgical history        Home Medications:  Biktarvy 50 mg-200 mg-25 mg oral tablet: 1 tab(s) orally once a day (22 Nov 2024 05:23)  pantoprazole 40 mg oral delayed release tablet: 1 tab(s) orally once a day (30 Dec 2023 00:48)  Xarelto 10 mg oral tablet: 1 tab(s) orally once a day (22 Nov 2024 05:22)    Allergies    cefepime (Unknown)    Intolerances      FAMILY HISTORY:  No pertinent family history in first degree relatives      Social History:       LABS                        11.6   5.26  )-----------( 224      ( 24 Jan 2025 07:52 )             36.7     01-24    136  |  107  |  10  ----------------------------<  98  3.4[L]   |  19[L]  |  1.02    Ca    8.5      24 Jan 2025 07:52  Phos  3.1     01-24  Mg     1.9     01-24    TPro  8.0  /  Alb  2.9[L]  /  TBili  0.4  /  DBili  x   /  AST  32  /  ALT  13  /  AlkPhos  102  01-23    PT/INR - ( 23 Jan 2025 17:07 )   PT: 14.8 sec;   INR: 1.27            ESR: 64  CRP: --  01-24 @ 07:52    Vital Signs Last 24 Hrs  T(C): 36.7 (24 Jan 2025 05:38), Max: 36.7 (23 Jan 2025 21:56)  T(F): 98 (24 Jan 2025 05:38), Max: 98 (23 Jan 2025 21:56)  HR: 58 (24 Jan 2025 05:38) (58 - 108)  BP: 126/58 (24 Jan 2025 05:38) (126/58 - 159/96)  BP(mean): 110 (23 Jan 2025 22:38) (110 - 110)  RR: 18 (24 Jan 2025 05:38) (18 - 20)  SpO2: 97% (24 Jan 2025 05:38) (94% - 100%)    Parameters below as of 24 Jan 2025 05:38  Patient On (Oxygen Delivery Method): room air        PHYSICAL EXAM  General: NAD, AA0x3    Left Lower Extremity Focused:  Vasc: 2/4 PT and DP, CFT wnl, TG warm to warm, edema to forefoot, erythema extended the leg  Derm: Left hallux plantar distal wound -PTB, mild fluctuance, erythema samantha wound, no malodor, no drainage upon manual pressure  Neuro: Protective sensations intact  MSK: s/p R 3rd digit amp    RADIOLOGY  < from: Xray Foot AP + Lateral + Oblique, Left (01.22.25 @ 12:40) >  IMPRESSION:  Hallux soft tissue swelling with focal plantar ulceration. Laterally   subluxed hallux IP joint with eroded articular margins indeterminate for   infection/septic versus inflammatory arthritic process. No tracking gas   collections or additional suspected areas of osteomyelitis; MRI could be   obtained to further assess as warranted. Redemonstrated well marginated   erosive changes along lateral 5th IP joint margin.    Stable partial 3rd ray amputation defect through distal metatarsal shaft   with tapered smoothly corticated stump margin and preserved overlying   soft tissue coverage.    No acute fractures or dislocations.    Tarsometatarsal alignment maintained without evidence for a Lisfranc   injury.    2nd hammertoe deformity. Preserved remaining joint spaces and no   additional joint margin erosions.    Tiny plantar calcaneal enthesophyte. Unremarkable distal Achilles tendon   shadow.    No discrete lytic or blastic lesions.    --- End of Report ---    < end of copied text >                      
HPI:  Patient is a 53 y/o M with pmhx of DVT/PE (on xarelto; poorly compliant), HIV/AIDS (CD4 count 127 VLUD 11/2024,  poorly compliant w/ Biktarvy and ppx), b/l LE cellulitis and OM s/p L 3rd digit amputation 5 yrs ago, tobacco smoker (23pack yrs), antiphospholipid syndrome, secondary syphilis treated 1 year ago, peripheral neuropathy, GERD, and depression presented with SOB and worsening of known LLE cellulitis. Patient was previously admitted 1/22/25 for his LLE cellulitis but left AMA a few hours later due to not receiving anxiety medication. Patient was also previously admitted 11/22-11/23/2024 for same concern of LLE cellulitis r/o OM and AMA'd due to his neighbor making too much noise and never finished course of abx.    Patient reports he needs to go to [place] to get "his teeth". He states he needs to do it over the next couple days.   He states he doesn't want to commit to the IV course now. He would like to leave with PO antibiotics, go to [place], get his teeth, and then return to the hospital for IV antibiotics       PAST MEDICAL & SURGICAL HISTORY:  Human immunodeficiency virus (HIV) infection      Osteomyelitis      Pulmonary embolism      DVT, lower extremity      No significant past surgical history            REVIEW OF SYSTEMS:    General:	 no weakness; no fevers, no chills  Skin/Breast: no rash  Respiratory and Thorax: no SOB, no cough  Cardiovascular:	No chest pain  Gastrointestinal:	 no nausea, vomiting , diarrhea  Genitourinary:	no dysuria, no difficulty urinating, no hematuria  Musculoskeletal:	no weakness, no joint swelling/pain  Neurological:	no focal weakness/numbness  Endocrine:	no polyuria, no polydipsia      ANTIBIOTICS:  MEDICATIONS  (STANDING):  bictegravir 50 mG/emtricitabine 200 mG/tenofovir alafenamide 25 mG (BIKTARVY) 1 Tablet(s) Oral every 24 hours  melatonin 3 milliGRAM(s) Oral at bedtime  nicotine - 21 mG/24Hr(s) Patch 1 Patch Transdermal every 24 hours  pantoprazole    Tablet 40 milliGRAM(s) Oral before breakfast  piperacillin/tazobactam IVPB.- 4.5 Gram(s) IV Intermittent once  piperacillin/tazobactam IVPB.. 4.5 Gram(s) IV Intermittent every 8 hours  rivaroxaban 10 milliGRAM(s) Oral <User Schedule>  trimethoprim  160 mG/sulfamethoxazole 800 mG 1 Tablet(s) Oral every 24 hours  vancomycin  IVPB 1250 milliGRAM(s) IV Intermittent every 12 hours    MEDICATIONS  (PRN):  acetaminophen     Tablet .. 650 milliGRAM(s) Oral every 6 hours PRN Temp greater or equal to 38C (100.4F), Mild Pain (1 - 3)  nicotine  Polacrilex Gum 4 milliGRAM(s) Oral every 12 hours PRN Smoking Cessation      Allergies    cefepime (Unknown)    Intolerances        SOCIAL HISTORY:    FAMILY HISTORY:  No pertinent family history in first degree relatives        Vital Signs Last 24 Hrs  T(C): 36.8 (27 Jan 2025 05:37), Max: 36.8 (26 Jan 2025 20:20)  T(F): 98.2 (27 Jan 2025 05:37), Max: 98.3 (26 Jan 2025 20:20)  HR: 68 (27 Jan 2025 05:37) (68 - 94)  BP: 107/71 (27 Jan 2025 05:37) (107/71 - 135/90)  BP(mean): --  RR: 18 (27 Jan 2025 05:37) (18 - 18)  SpO2: 95% (27 Jan 2025 05:37) (95% - 98%)    Parameters below as of 27 Jan 2025 05:37  Patient On (Oxygen Delivery Method): room air        PHYSICAL EXAM:  Constitutional:Well-developed, well nourished  Eyes:MATTEO, EOMI  Ear/Nose/Throat: no oral lesion, no sinus tenderness on percussion	  Neck:no JVD, no lymphadenopathy, supple  Respiratory: CTA dewey  Cardiovascular: S1S2 RRR, no murmurs  Gastrointestinal:soft, (+) BS, no HSM  Extremities:  left foot with ulcer at base of 1st toe; no active drainage, no erythema   Vascular: DP Pulse:	right normal; left normal            LABS:                        13.1   6.44  )-----------( 302      ( 26 Jan 2025 05:30 )             41.3     01-26    137  |  104  |  14  ----------------------------<  90  3.9   |  23  |  1.24    Ca    9.2      26 Jan 2025 05:30  Phos  3.5     01-26  Mg     2.0     01-26    TPro  7.9  /  Alb  3.4  /  TBili  0.2  /  DBili  x   /  AST  14  /  ALT  10  /  AlkPhos  97  01-26      Urinalysis Basic - ( 26 Jan 2025 05:30 )    Color: x / Appearance: x / SG: x / pH: x  Gluc: 90 mg/dL / Ketone: x  / Bili: x / Urobili: x   Blood: x / Protein: x / Nitrite: x   Leuk Esterase: x / RBC: x / WBC x   Sq Epi: x / Non Sq Epi: x / Bacteria: x        MICROBIOLOGY:    Culture - Wound Aerobic (01.22.25 @ 12:57)    -  Cefepime: S <=2   -  Ceftazidime: S 4   -  Ciprofloxacin: S <=0.25   -  Imipenem: S <=1   -  Levofloxacin: S <=0.5   -  Meropenem: S <=1   -  Piperacillin/Tazobactam: S <=8   -  Amikacin: S <=16   Specimen Source: Skin/Wound   Culture Results:   Culture yields growth of greater than 3 colony types of  bacteria,  which may indicate contamination and normal brian  Call client services within 7 days if further workup is clinically  indicated. Culture includes  Numerous Pseudomonas aeruginosa  Numerous Streptococcus dysgalactiae (Group C/G) "Susceptibilities not  performed"   Organism Identification: Pseudomonas aeruginosa   Organism: Pseudomonas aeruginosa   Method Type: TAYE      RADIOLOGY & ADDITIONAL STUDIES:    < from: MR Foot w/wo IV Cont, Left (01.25.25 @ 16:21) >  Impression:    Findings compatible with acute osteomyelitis involving the first distal   phalanx and the first proximal phalangeal head, with probable intervening   septic arthritis at the first interphalangeal joint. Probable surrounding   cellulitis without visualized abscess.    Probable 3 cm infiltrative fibroma within theplantar soft tissues deep   to the first metatarsal shaft.    < end of copied text >

## 2025-01-27 NOTE — PROGRESS NOTE ADULT - ATTENDING COMMENTS
Patient was seen and examined at bedside. Case discuss with resident. Pt w/o complaints this morning. Pt denied left sided foot pain.       A/P: 55 y/o M with pmhx of poorly controlled HIV/AIDs, multiple episodes of b/l LE cellulitis and OM, tobacco smoker, DVT/PE (on xarelto), antiphospholipid syndrome, secondary syphilis treated 1 year ago, peripheral neuropathy, GERD, and depression, who presented to the ED after leaving AMA  admitted for further treatment of known LLE cellulitis. Pt s/p MRI Foot on 1/25 which showed likely acute osteomyelitis the first distal   phalanx and the first proximal phalangeal head, with probable intervening septic arthritis at the first interphalangeal joint. Probable surrounding   cellulitis without visualized abscess. In addition pt wound cx on 1/22 which is growing Pseudomonas.     #Acute Osteomyelitis   -Pt s/p MRI Foot on 1/25 which showed likely acute osteomyelitis the first distal   phalanx and the first proximal phalangeal head, with probable intervening septic arthritis at the first interphalangeal joint. Probable surrounding   cellulitis without visualized abscess  -Podiatry following and pt is for bone biopsy on 1/26   -Zosyn held for bone biopsy  -Will consult ID     #DVT/PE  -Pt is on Xarelto     #HIV  -Will continue BIKTARVY and bactrium     #DISPO  -Pending bone biopsy     55 minutes spent on total encounter. The necessity of the time spent during the encounter on this date of service was due to:    Review of hospital course, labs, vitals, medical records.  Bedside exam and interview of the patient  Discussed plan of care with primary team   Documenting the encounter.
Patient was seen and examined at bedside on 1/24/2025 at 330 pm. Patient reports he feels that LLE is improving. Denies SOB, CP, N/V. ROS is otherwise negative. Vitals, labwork and pertinent imaging reviewed. Exam - NAD, AAO x 4, PERRLA, EOMI, MMM, supple neck, chest - CTA b/l, CV - tachycardia, s1s2, no m/r/g, no JVD, abd - soft, NTND, + BS, back - midline, ext - wwp, LLE wrapped, edema, receding erythema, warmth, psych - normal affect, skin - no rash    Plan:  -C/w abx  -F/u cultures - superficial culture growing Pseudomonas  -Podiatry consulted  -Pending MRI  -Pain control
54M with a PMH of poorly controlled HIV/AIDS (CD4 260, VL UD), multiple prior episodes of bilateral lower extremity cellulitis and osteomyelitis, active smoking, DVT/PE on xarelto, APLS, secondary syphilis s/p treatment, peripheral neuropathy, GERD, and depression who presented with OM of the L foot as well as LLE cellulitis.     VS reviewed and stable on RA     Exam:   Appears comfortable sitting up in bed   MMM  Normal WOB on RA, CTAB   RRR, no mrg   Abdomen soft, nontender, nondistended  Extremities warm, erythema over much of LLE with some tenderness to palpation but no drainage, L foot wrapped in clean gauze dressing   AOX3, no focal neuro deficits     Labs reviewed, holiday today   Stable CBC and BMP yesterday   ESR and CRP are elevated     Imaging including MR of the L foot reviewed.     A/P:   -Osteomyelitis of L 1st distal phalanx, chronic foot ulcer, septic arthritis of first L toe: continue broad coverage with vancomycin/zosyn, consult ID today. Follow up bone and blood cultures. Appreciate podiatry recommendations.   -HIV/AIDS: continue biktarvy, continue bactrim for PJP ppx given it seems pt does not regularly take ARVs at home.   -APLS: continue home xarelto     Rest as per resident note.

## 2025-01-27 NOTE — PROGRESS NOTE ADULT - SUBJECTIVE AND OBJECTIVE BOX
Patient is a 54y old  Male who presents with a chief complaint of LLE cellulitis (27 Jan 2025 08:45)      HOSPITAL COURSE:     OVERNIGHT EVENTS:    SUBJECTIVE:     ROS: otherwise negative      T(C): 36.8 (01-27-25 @ 05:37), Max: 36.8 (01-26-25 @ 20:20)  HR: 68 (01-27-25 @ 05:37) (68 - 94)  BP: 107/71 (01-27-25 @ 05:37) (107/71 - 135/90)  RR: 18 (01-27-25 @ 05:37) (18 - 18)  SpO2: 95% (01-27-25 @ 05:37) (95% - 98%)  Wt(kg): --Vital Signs Last 24 Hrs  T(C): 36.8 (27 Jan 2025 05:37), Max: 36.8 (26 Jan 2025 20:20)  T(F): 98.2 (27 Jan 2025 05:37), Max: 98.3 (26 Jan 2025 20:20)  HR: 68 (27 Jan 2025 05:37) (68 - 94)  BP: 107/71 (27 Jan 2025 05:37) (107/71 - 135/90)  BP(mean): 90 (26 Jan 2025 12:36) (90 - 90)  RR: 18 (27 Jan 2025 05:37) (18 - 18)  SpO2: 95% (27 Jan 2025 05:37) (95% - 98%)    Parameters below as of 27 Jan 2025 05:37  Patient On (Oxygen Delivery Method): room air        PHYSICAL EXAM:  Constitutional: resting comfortably in bed; NAD  Head: NC/AT  Eyes: PERRL, EOMI, anicteric sclera  ENT: no nasal discharge; MMM  Neck: supple; no JVD or thyromegaly  Respiratory: CTA B/L; no W/R/R, no retractions  Cardiac: +S1/S2; RRR; no M/R/G  Gastrointestinal: soft, NT/ND; no rebound or guarding; +BSx4  Back: spine midline, no bony tenderness or step-offs; no CVAT B/L  Extremities: WWP, no clubbing or cyanosis; no peripheral edema. Capillary refill <2 sec  Musculoskeletal: NROM x4; no joint swelling, tenderness or erythema  Vascular: 2+ radial, DP/PT pulses B/L  Dermatologic: skin warm, dry and intact; no rashes, wounds, or scars  Lymphatic: no submandibular or cervical LAD  Neurologic: AAOx3; CNII-XII grossly intact; no focal deficits  Psychiatric: affect and characteristics of appearance, verbalizations, behaviors are appropriate    LABS:                        13.1   6.44  )-----------( 302      ( 26 Jan 2025 05:30 )             41.3     01-26    137  |  104  |  14  ----------------------------<  90  3.9   |  23  |  1.24    Ca    9.2      26 Jan 2025 05:30  Phos  3.5     01-26  Mg     2.0     01-26    TPro  7.9  /  Alb  3.4  /  TBili  0.2  /  DBili  x   /  AST  14  /  ALT  10  /  AlkPhos  97  01-26        Urinalysis Basic - ( 26 Jan 2025 05:30 )    Color: x / Appearance: x / SG: x / pH: x  Gluc: 90 mg/dL / Ketone: x  / Bili: x / Urobili: x   Blood: x / Protein: x / Nitrite: x   Leuk Esterase: x / RBC: x / WBC x   Sq Epi: x / Non Sq Epi: x / Bacteria: x      CAPILLARY BLOOD GLUCOSE            Urinalysis Basic - ( 26 Jan 2025 05:30 )    Color: x / Appearance: x / SG: x / pH: x  Gluc: 90 mg/dL / Ketone: x  / Bili: x / Urobili: x   Blood: x / Protein: x / Nitrite: x   Leuk Esterase: x / RBC: x / WBC x   Sq Epi: x / Non Sq Epi: x / Bacteria: x        MEDICATIONS  (STANDING):  bictegravir 50 mG/emtricitabine 200 mG/tenofovir alafenamide 25 mG (BIKTARVY) 1 Tablet(s) Oral every 24 hours  melatonin 3 milliGRAM(s) Oral at bedtime  nicotine - 21 mG/24Hr(s) Patch 1 Patch Transdermal every 24 hours  pantoprazole    Tablet 40 milliGRAM(s) Oral before breakfast  piperacillin/tazobactam IVPB. 4.5 Gram(s) IV Intermittent once  piperacillin/tazobactam IVPB.- 4.5 Gram(s) IV Intermittent once  piperacillin/tazobactam IVPB.. 4.5 Gram(s) IV Intermittent every 8 hours  rivaroxaban 10 milliGRAM(s) Oral <User Schedule>  trimethoprim  160 mG/sulfamethoxazole 800 mG 1 Tablet(s) Oral every 24 hours  vancomycin  IVPB 1250 milliGRAM(s) IV Intermittent every 12 hours    MEDICATIONS  (PRN):  acetaminophen     Tablet .. 650 milliGRAM(s) Oral every 6 hours PRN Temp greater or equal to 38C (100.4F), Mild Pain (1 - 3)  nicotine  Polacrilex Gum 4 milliGRAM(s) Oral every 12 hours PRN Smoking Cessation      RADIOLOGY & ADDITIONAL TESTS: Reviewed   Patient is a 54y old  Male who presents with a chief complaint of LLE cellulitis (27 Jan 2025 08:45)    OVERNIGHT EVENTS: melatonin given overnight    SUBJECTIVE: seen bedside in AM. no pain, no clinical complaints. denies infectious symptoms, fevers, chills. frustrated that bone biopsy didn't occur yesterday.     ROS: otherwise negative      T(C): 36.8 (01-27-25 @ 05:37), Max: 36.8 (01-26-25 @ 20:20)  HR: 68 (01-27-25 @ 05:37) (68 - 94)  BP: 107/71 (01-27-25 @ 05:37) (107/71 - 135/90)  RR: 18 (01-27-25 @ 05:37) (18 - 18)  SpO2: 95% (01-27-25 @ 05:37) (95% - 98%)  Wt(kg): --Vital Signs Last 24 Hrs  T(C): 36.8 (27 Jan 2025 05:37), Max: 36.8 (26 Jan 2025 20:20)  T(F): 98.2 (27 Jan 2025 05:37), Max: 98.3 (26 Jan 2025 20:20)  HR: 68 (27 Jan 2025 05:37) (68 - 94)  BP: 107/71 (27 Jan 2025 05:37) (107/71 - 135/90)  BP(mean): 90 (26 Jan 2025 12:36) (90 - 90)  RR: 18 (27 Jan 2025 05:37) (18 - 18)  SpO2: 95% (27 Jan 2025 05:37) (95% - 98%)    Parameters below as of 27 Jan 2025 05:37  Patient On (Oxygen Delivery Method): room air        PHYSICAL EXAM:  Constitutional: resting comfortably in bed; NAD  Head: NC/AT  Respiratory: CTA B/L; no W/R/R, no retractions  Cardiac: +S1/S2; RRR; no M/R/G  Gastrointestinal: soft, NT/ND; no rebound or guarding; +BSx4  Extremities: LLE cellulitic changes improved per reduced erythema relative to ink marking, mildly TTP with mild edema, foot wrapped in clean dry intact gauze with 5/5 strength and able to wiggle toes  Musculoskeletal: NROM x4; no joint swelling, tenderness or erythema  Vascular: 2+ radial, DP/PT pulses B/L  Neurologic: AAOx3    LABS:                        13.1   6.44  )-----------( 302      ( 26 Jan 2025 05:30 )             41.3     01-26    137  |  104  |  14  ----------------------------<  90  3.9   |  23  |  1.24    Ca    9.2      26 Jan 2025 05:30  Phos  3.5     01-26  Mg     2.0     01-26    TPro  7.9  /  Alb  3.4  /  TBili  0.2  /  DBili  x   /  AST  14  /  ALT  10  /  AlkPhos  97  01-26        Urinalysis Basic - ( 26 Jan 2025 05:30 )    Color: x / Appearance: x / SG: x / pH: x  Gluc: 90 mg/dL / Ketone: x  / Bili: x / Urobili: x   Blood: x / Protein: x / Nitrite: x   Leuk Esterase: x / RBC: x / WBC x   Sq Epi: x / Non Sq Epi: x / Bacteria: x      CAPILLARY BLOOD GLUCOSE            Urinalysis Basic - ( 26 Jan 2025 05:30 )    Color: x / Appearance: x / SG: x / pH: x  Gluc: 90 mg/dL / Ketone: x  / Bili: x / Urobili: x   Blood: x / Protein: x / Nitrite: x   Leuk Esterase: x / RBC: x / WBC x   Sq Epi: x / Non Sq Epi: x / Bacteria: x        MEDICATIONS  (STANDING):  bictegravir 50 mG/emtricitabine 200 mG/tenofovir alafenamide 25 mG (BIKTARVY) 1 Tablet(s) Oral every 24 hours  melatonin 3 milliGRAM(s) Oral at bedtime  nicotine - 21 mG/24Hr(s) Patch 1 Patch Transdermal every 24 hours  pantoprazole    Tablet 40 milliGRAM(s) Oral before breakfast  piperacillin/tazobactam IVPB. 4.5 Gram(s) IV Intermittent once  piperacillin/tazobactam IVPB.- 4.5 Gram(s) IV Intermittent once  piperacillin/tazobactam IVPB.. 4.5 Gram(s) IV Intermittent every 8 hours  rivaroxaban 10 milliGRAM(s) Oral <User Schedule>  trimethoprim  160 mG/sulfamethoxazole 800 mG 1 Tablet(s) Oral every 24 hours  vancomycin  IVPB 1250 milliGRAM(s) IV Intermittent every 12 hours    MEDICATIONS  (PRN):  acetaminophen     Tablet .. 650 milliGRAM(s) Oral every 6 hours PRN Temp greater or equal to 38C (100.4F), Mild Pain (1 - 3)  nicotine  Polacrilex Gum 4 milliGRAM(s) Oral every 12 hours PRN Smoking Cessation      RADIOLOGY & ADDITIONAL TESTS: Reviewed

## 2025-01-27 NOTE — PROCEDURE NOTE - GENERAL PROCEDURE DETAILS
L foot hallux prepped and draped in sterile manner. Using sterile jamshidi needle bone sample taken from distal phalanx of hallux and sent for CX/path. Foot dressed with betadine DSD

## 2025-01-27 NOTE — PROCEDURE NOTE - NSCOMPLICATION_GEN_A_CORE
Archie with [facility] called.    The Pharmacy questioning the dose for the medication, Etodolac (LODINE), prescribed.    The Pharmacy can be reached at [phone].  
Called patient and informed of negative blood testing for d-dimer and troponin. Chest x-ray also negative. We'll treat symptoms as musculoskeletal with Lodine and cyclobenzaprine  
no complications

## 2025-01-28 RX ORDER — AMOXICILLIN AND CLAVULANATE POTASSIUM 200; 28.5 MG/5ML; MG/5ML
875 POWDER, FOR SUSPENSION ORAL
Qty: 14 | Refills: 0
Start: 2025-01-28 | End: 2025-02-03

## 2025-01-28 RX ORDER — LEVOFLOXACIN 500 MG/1
1 TABLET, FILM COATED ORAL
Qty: 7 | Refills: 0
Start: 2025-01-28 | End: 2025-02-03

## 2025-01-30 DIAGNOSIS — A41.9 SEPSIS, UNSPECIFIED ORGANISM: ICD-10-CM

## 2025-01-30 DIAGNOSIS — R65.20 SEVERE SEPSIS WITHOUT SEPTIC SHOCK: ICD-10-CM

## 2025-01-30 DIAGNOSIS — L97.529 NON-PRESSURE CHRONIC ULCER OF OTHER PART OF LEFT FOOT WITH UNSPECIFIED SEVERITY: ICD-10-CM

## 2025-01-30 DIAGNOSIS — Z87.891 PERSONAL HISTORY OF NICOTINE DEPENDENCE: ICD-10-CM

## 2025-01-30 DIAGNOSIS — G62.9 POLYNEUROPATHY, UNSPECIFIED: ICD-10-CM

## 2025-01-30 DIAGNOSIS — F32.A DEPRESSION, UNSPECIFIED: ICD-10-CM

## 2025-01-30 DIAGNOSIS — B20 HUMAN IMMUNODEFICIENCY VIRUS [HIV] DISEASE: ICD-10-CM

## 2025-01-30 DIAGNOSIS — K21.9 GASTRO-ESOPHAGEAL REFLUX DISEASE WITHOUT ESOPHAGITIS: ICD-10-CM

## 2025-01-30 DIAGNOSIS — L03.116 CELLULITIS OF LEFT LOWER LIMB: ICD-10-CM

## 2025-01-30 LAB — GRAM STN FLD: ABNORMAL

## 2025-01-31 LAB
-  CEFEPIME: SIGNIFICANT CHANGE UP
-  CEFTAZIDIME: SIGNIFICANT CHANGE UP
-  CIPROFLOXACIN: SIGNIFICANT CHANGE UP
-  IMIPENEM: SIGNIFICANT CHANGE UP
-  LEVOFLOXACIN: SIGNIFICANT CHANGE UP
-  MEROPENEM: SIGNIFICANT CHANGE UP
-  PIPERACILLIN/TAZOBACTAM: SIGNIFICANT CHANGE UP
METHOD TYPE: SIGNIFICANT CHANGE UP

## 2025-02-03 LAB
CULTURE RESULTS: ABNORMAL
ORGANISM # SPEC MICROSCOPIC CNT: ABNORMAL
ORGANISM # SPEC MICROSCOPIC CNT: SIGNIFICANT CHANGE UP
SPECIMEN SOURCE: SIGNIFICANT CHANGE UP

## 2025-02-04 DIAGNOSIS — L03.116 CELLULITIS OF LEFT LOWER LIMB: ICD-10-CM

## 2025-02-04 DIAGNOSIS — K21.9 GASTRO-ESOPHAGEAL REFLUX DISEASE WITHOUT ESOPHAGITIS: ICD-10-CM

## 2025-02-04 DIAGNOSIS — Z91.199 PATIENT'S NONCOMPLIANCE WITH OTHER MEDICAL TREATMENT AND REGIMEN DUE TO UNSPECIFIED REASON: ICD-10-CM

## 2025-02-04 DIAGNOSIS — Z89.422 ACQUIRED ABSENCE OF OTHER LEFT TOE(S): ICD-10-CM

## 2025-02-04 DIAGNOSIS — F17.210 NICOTINE DEPENDENCE, CIGARETTES, UNCOMPLICATED: ICD-10-CM

## 2025-02-04 DIAGNOSIS — Z86.718 PERSONAL HISTORY OF OTHER VENOUS THROMBOSIS AND EMBOLISM: ICD-10-CM

## 2025-02-04 DIAGNOSIS — L97.529 NON-PRESSURE CHRONIC ULCER OF OTHER PART OF LEFT FOOT WITH UNSPECIFIED SEVERITY: ICD-10-CM

## 2025-02-04 DIAGNOSIS — B20 HUMAN IMMUNODEFICIENCY VIRUS [HIV] DISEASE: ICD-10-CM

## 2025-02-04 DIAGNOSIS — Z79.01 LONG TERM (CURRENT) USE OF ANTICOAGULANTS: ICD-10-CM

## 2025-02-04 DIAGNOSIS — G62.9 POLYNEUROPATHY, UNSPECIFIED: ICD-10-CM

## 2025-02-04 DIAGNOSIS — D68.61 ANTIPHOSPHOLIPID SYNDROME: ICD-10-CM

## 2025-02-04 DIAGNOSIS — F32.A DEPRESSION, UNSPECIFIED: ICD-10-CM

## 2025-02-15 NOTE — PROGRESS NOTE ADULT - TIME-BASED BILLING (NON-CRITICAL CARE)
Time-based billing (NON-critical care)
Stable
Time-based billing (NON-critical care)
Time-based billing (NON-critical care)

## 2025-02-17 NOTE — PROGRESS NOTE ADULT - ATTENDING COMMENTS
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     #LLE cellulitis   -Both feet deformed. Suspect underlying neuropathy (due to HIV?)  cause of recurrent trauma/infections/poor wound healing. Could be element of PVD; chronic leg swelling but no claudications. Does not think he has DM.   -C/w vanco and zosyn renally dosed   -MRI   -Appreciate Pods recs   -Start low dose gabapentin for neuropathy, obtain A1c       #Hx of DVT/PE   Male lupus?  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     #LLE cellulitis   -Both feet deformed. Suspect underlying neuropathy (due to HIV?)  cause of recurrent trauma/infections/poor wound healing. Could be element of PVD; chronic leg swelling but no claudications. Does not think he has DM.   -C/w vanco and zosyn renally dosed   -MRI   -Appreciate Pods recs   -Start low dose gabapentin for neuropathy, obtain A1c       #Hx of DVT/PE   Male lupus?  C/w AC    HIV   C/w home biktarvy Yes
